# Patient Record
Sex: FEMALE | Race: WHITE | NOT HISPANIC OR LATINO | Employment: UNEMPLOYED | URBAN - METROPOLITAN AREA
[De-identification: names, ages, dates, MRNs, and addresses within clinical notes are randomized per-mention and may not be internally consistent; named-entity substitution may affect disease eponyms.]

---

## 2022-01-23 ENCOUNTER — APPOINTMENT (OUTPATIENT)
Dept: ULTRASOUND IMAGING | Facility: CLINIC | Age: 62
DRG: 481 | End: 2022-01-23
Attending: STUDENT IN AN ORGANIZED HEALTH CARE EDUCATION/TRAINING PROGRAM
Payer: COMMERCIAL

## 2022-01-23 ENCOUNTER — ANESTHESIA EVENT (OUTPATIENT)
Dept: SURGERY | Facility: CLINIC | Age: 62
DRG: 481 | End: 2022-01-23
Payer: COMMERCIAL

## 2022-01-23 ENCOUNTER — APPOINTMENT (OUTPATIENT)
Dept: GENERAL RADIOLOGY | Facility: CLINIC | Age: 62
DRG: 481 | End: 2022-01-23
Attending: EMERGENCY MEDICINE
Payer: COMMERCIAL

## 2022-01-23 ENCOUNTER — APPOINTMENT (OUTPATIENT)
Dept: GENERAL RADIOLOGY | Facility: CLINIC | Age: 62
DRG: 481 | End: 2022-01-23
Attending: INTERNAL MEDICINE
Payer: COMMERCIAL

## 2022-01-23 ENCOUNTER — ANESTHESIA (OUTPATIENT)
Dept: SURGERY | Facility: CLINIC | Age: 62
DRG: 481 | End: 2022-01-23
Payer: COMMERCIAL

## 2022-01-23 ENCOUNTER — HOSPITAL ENCOUNTER (INPATIENT)
Facility: CLINIC | Age: 62
LOS: 10 days | Discharge: NURSING, REHAB OR LONG-TERM CARE | DRG: 481 | End: 2022-02-02
Attending: EMERGENCY MEDICINE | Admitting: INTERNAL MEDICINE
Payer: COMMERCIAL

## 2022-01-23 DIAGNOSIS — S72.001A CLOSED FRACTURE OF RIGHT HIP, INITIAL ENCOUNTER (H): ICD-10-CM

## 2022-01-23 LAB
ABO/RH(D): NORMAL
ALBUMIN SERPL-MCNC: 3.3 G/DL (ref 3.4–5)
ALP SERPL-CCNC: 115 U/L (ref 40–150)
ALT SERPL W P-5'-P-CCNC: 51 U/L (ref 0–50)
ANION GAP SERPL CALCULATED.3IONS-SCNC: 8 MMOL/L (ref 3–14)
ANTIBODY SCREEN: NEGATIVE
APTT PPP: 26 SECONDS (ref 22–38)
AST SERPL W P-5'-P-CCNC: 91 U/L (ref 0–45)
ATRIAL RATE - MUSE: 100 BPM
BASOPHILS # BLD AUTO: 0 10E3/UL (ref 0–0.2)
BASOPHILS NFR BLD AUTO: 0 %
BILIRUB DIRECT SERPL-MCNC: 0.1 MG/DL (ref 0–0.2)
BILIRUB SERPL-MCNC: 0.4 MG/DL (ref 0.2–1.3)
BLD PROD TYP BPU: NORMAL
BLOOD COMPONENT TYPE: NORMAL
BUN SERPL-MCNC: 11 MG/DL (ref 7–30)
CALCIUM SERPL-MCNC: 8.2 MG/DL (ref 8.5–10.1)
CHLORIDE BLD-SCNC: 101 MMOL/L (ref 94–109)
CO2 SERPL-SCNC: 26 MMOL/L (ref 20–32)
CODING SYSTEM: NORMAL
CREAT SERPL-MCNC: 0.78 MG/DL (ref 0.52–1.04)
CROSSMATCH: NORMAL
DIASTOLIC BLOOD PRESSURE - MUSE: NORMAL MMHG
EOSINOPHIL # BLD AUTO: 0.1 10E3/UL (ref 0–0.7)
EOSINOPHIL NFR BLD AUTO: 1 %
ERYTHROCYTE [DISTWIDTH] IN BLOOD BY AUTOMATED COUNT: 19.3 % (ref 10–15)
GFR SERPL CREATININE-BSD FRML MDRD: 85 ML/MIN/1.73M2
GLUCOSE BLD-MCNC: 411 MG/DL (ref 70–99)
GLUCOSE BLDC GLUCOMTR-MCNC: 179 MG/DL (ref 70–99)
GLUCOSE BLDC GLUCOMTR-MCNC: 187 MG/DL (ref 70–99)
GLUCOSE BLDC GLUCOMTR-MCNC: 195 MG/DL (ref 70–99)
GLUCOSE BLDC GLUCOMTR-MCNC: 201 MG/DL (ref 70–99)
GLUCOSE BLDC GLUCOMTR-MCNC: 356 MG/DL (ref 70–99)
GLUCOSE BLDC GLUCOMTR-MCNC: 366 MG/DL (ref 70–99)
HBA1C MFR BLD: 10.4 % (ref 0–5.6)
HCT VFR BLD AUTO: 28.5 % (ref 35–47)
HGB BLD-MCNC: 7.6 G/DL (ref 11.7–15.7)
HGB BLD-MCNC: 7.7 G/DL (ref 11.7–15.7)
HGB BLD-MCNC: 7.9 G/DL (ref 11.7–15.7)
HGB BLD-MCNC: 8.5 G/DL (ref 11.7–15.7)
HOLD SPECIMEN: NORMAL
HOLD SPECIMEN: NORMAL
IMM GRANULOCYTES # BLD: 0 10E3/UL
IMM GRANULOCYTES NFR BLD: 0 %
INR PPP: 1.01 (ref 0.85–1.15)
INTERPRETATION ECG - MUSE: NORMAL
ISSUE DATE AND TIME: NORMAL
LYMPHOCYTES # BLD AUTO: 0.6 10E3/UL (ref 0.8–5.3)
LYMPHOCYTES NFR BLD AUTO: 8 %
MAGNESIUM SERPL-MCNC: 2.2 MG/DL (ref 1.6–2.3)
MCH RBC QN AUTO: 18.9 PG (ref 26.5–33)
MCHC RBC AUTO-ENTMCNC: 27.7 G/DL (ref 31.5–36.5)
MCV RBC AUTO: 68 FL (ref 78–100)
MONOCYTES # BLD AUTO: 0.4 10E3/UL (ref 0–1.3)
MONOCYTES NFR BLD AUTO: 6 %
NEUTROPHILS # BLD AUTO: 6.3 10E3/UL (ref 1.6–8.3)
NEUTROPHILS NFR BLD AUTO: 85 %
NRBC # BLD AUTO: 0 10E3/UL
NRBC BLD AUTO-RTO: 0 /100
P AXIS - MUSE: 61 DEGREES
PHOSPHATE SERPL-MCNC: 3.3 MG/DL (ref 2.5–4.5)
PHOSPHATE SERPL-MCNC: 3.6 MG/DL (ref 2.5–4.5)
PLATELET # BLD AUTO: 265 10E3/UL (ref 150–450)
POTASSIUM BLD-SCNC: 3.6 MMOL/L (ref 3.4–5.3)
POTASSIUM BLD-SCNC: 3.6 MMOL/L (ref 3.4–5.3)
PR INTERVAL - MUSE: 140 MS
PROT SERPL-MCNC: 7 G/DL (ref 6.8–8.8)
QRS DURATION - MUSE: 84 MS
QT - MUSE: 366 MS
QTC - MUSE: 472 MS
R AXIS - MUSE: -15 DEGREES
RBC # BLD AUTO: 4.17 10E6/UL (ref 3.8–5.2)
SARS-COV-2 RNA RESP QL NAA+PROBE: NEGATIVE
SODIUM SERPL-SCNC: 135 MMOL/L (ref 133–144)
SPECIMEN EXPIRATION DATE: NORMAL
SYSTOLIC BLOOD PRESSURE - MUSE: NORMAL MMHG
T AXIS - MUSE: 53 DEGREES
UNIT ABO/RH: NORMAL
UNIT NUMBER: NORMAL
UNIT STATUS: NORMAL
UNIT TYPE ISBT: 6200
VENTRICULAR RATE- MUSE: 100 BPM
WBC # BLD AUTO: 7.4 10E3/UL (ref 4–11)

## 2022-01-23 PROCEDURE — P9041 ALBUMIN (HUMAN),5%, 50ML: HCPCS | Performed by: NURSE ANESTHETIST, CERTIFIED REGISTERED

## 2022-01-23 PROCEDURE — 250N000011 HC RX IP 250 OP 636: Performed by: INTERNAL MEDICINE

## 2022-01-23 PROCEDURE — 0QS604Z REPOSITION RIGHT UPPER FEMUR WITH INTERNAL FIXATION DEVICE, OPEN APPROACH: ICD-10-PCS | Performed by: ORTHOPAEDIC SURGERY

## 2022-01-23 PROCEDURE — 84132 ASSAY OF SERUM POTASSIUM: CPT | Performed by: INTERNAL MEDICINE

## 2022-01-23 PROCEDURE — 360N000084 HC SURGERY LEVEL 4 W/ FLUORO, PER MIN: Performed by: ORTHOPAEDIC SURGERY

## 2022-01-23 PROCEDURE — 710N000010 HC RECOVERY PHASE 1, LEVEL 2, PER MIN: Performed by: ORTHOPAEDIC SURGERY

## 2022-01-23 PROCEDURE — C9113 INJ PANTOPRAZOLE SODIUM, VIA: HCPCS | Performed by: INTERNAL MEDICINE

## 2022-01-23 PROCEDURE — 93005 ELECTROCARDIOGRAM TRACING: CPT | Mod: 59

## 2022-01-23 PROCEDURE — 96375 TX/PRO/DX INJ NEW DRUG ADDON: CPT

## 2022-01-23 PROCEDURE — 250N000009 HC RX 250: Performed by: ANESTHESIOLOGY

## 2022-01-23 PROCEDURE — 76705 ECHO EXAM OF ABDOMEN: CPT

## 2022-01-23 PROCEDURE — 86923 COMPATIBILITY TEST ELECTRIC: CPT | Performed by: ANESTHESIOLOGY

## 2022-01-23 PROCEDURE — 258N000003 HC RX IP 258 OP 636: Performed by: INTERNAL MEDICINE

## 2022-01-23 PROCEDURE — 85025 COMPLETE CBC W/AUTO DIFF WBC: CPT | Performed by: EMERGENCY MEDICINE

## 2022-01-23 PROCEDURE — 87635 SARS-COV-2 COVID-19 AMP PRB: CPT | Performed by: EMERGENCY MEDICINE

## 2022-01-23 PROCEDURE — 250N000011 HC RX IP 250 OP 636: Performed by: ANESTHESIOLOGY

## 2022-01-23 PROCEDURE — 258N000003 HC RX IP 258 OP 636: Performed by: PHYSICIAN ASSISTANT

## 2022-01-23 PROCEDURE — 85018 HEMOGLOBIN: CPT | Performed by: INTERNAL MEDICINE

## 2022-01-23 PROCEDURE — 99223 1ST HOSP IP/OBS HIGH 75: CPT | Mod: AI | Performed by: INTERNAL MEDICINE

## 2022-01-23 PROCEDURE — P9016 RBC LEUKOCYTES REDUCED: HCPCS | Performed by: ANESTHESIOLOGY

## 2022-01-23 PROCEDURE — 36415 COLL VENOUS BLD VENIPUNCTURE: CPT | Performed by: INTERNAL MEDICINE

## 2022-01-23 PROCEDURE — 999N000179 XR SURGERY CARM FLUORO LESS THAN 5 MIN W STILLS

## 2022-01-23 PROCEDURE — 120N000001 HC R&B MED SURG/OB

## 2022-01-23 PROCEDURE — 80053 COMPREHEN METABOLIC PANEL: CPT | Performed by: EMERGENCY MEDICINE

## 2022-01-23 PROCEDURE — 250N000011 HC RX IP 250 OP 636: Performed by: NURSE ANESTHETIST, CERTIFIED REGISTERED

## 2022-01-23 PROCEDURE — 36415 COLL VENOUS BLD VENIPUNCTURE: CPT | Performed by: EMERGENCY MEDICINE

## 2022-01-23 PROCEDURE — 85610 PROTHROMBIN TIME: CPT | Performed by: EMERGENCY MEDICINE

## 2022-01-23 PROCEDURE — 99285 EMERGENCY DEPT VISIT HI MDM: CPT | Mod: 25

## 2022-01-23 PROCEDURE — 84100 ASSAY OF PHOSPHORUS: CPT | Performed by: INTERNAL MEDICINE

## 2022-01-23 PROCEDURE — 250N000025 HC SEVOFLURANE, PER MIN: Performed by: ORTHOPAEDIC SURGERY

## 2022-01-23 PROCEDURE — 73502 X-RAY EXAM HIP UNI 2-3 VIEWS: CPT

## 2022-01-23 PROCEDURE — 82248 BILIRUBIN DIRECT: CPT | Performed by: INTERNAL MEDICINE

## 2022-01-23 PROCEDURE — 370N000017 HC ANESTHESIA TECHNICAL FEE, PER MIN: Performed by: ORTHOPAEDIC SURGERY

## 2022-01-23 PROCEDURE — 86901 BLOOD TYPING SEROLOGIC RH(D): CPT | Performed by: EMERGENCY MEDICINE

## 2022-01-23 PROCEDURE — 258N000003 HC RX IP 258 OP 636: Performed by: ANESTHESIOLOGY

## 2022-01-23 PROCEDURE — 83735 ASSAY OF MAGNESIUM: CPT | Performed by: INTERNAL MEDICINE

## 2022-01-23 PROCEDURE — C1713 ANCHOR/SCREW BN/BN,TIS/BN: HCPCS | Performed by: ORTHOPAEDIC SURGERY

## 2022-01-23 PROCEDURE — 250N000013 HC RX MED GY IP 250 OP 250 PS 637: Performed by: PHYSICIAN ASSISTANT

## 2022-01-23 PROCEDURE — 250N000009 HC RX 250: Performed by: NURSE ANESTHETIST, CERTIFIED REGISTERED

## 2022-01-23 PROCEDURE — 83036 HEMOGLOBIN GLYCOSYLATED A1C: CPT | Performed by: INTERNAL MEDICINE

## 2022-01-23 PROCEDURE — 96374 THER/PROPH/DIAG INJ IV PUSH: CPT | Mod: 59

## 2022-01-23 PROCEDURE — 250N000009 HC RX 250: Performed by: PHYSICIAN ASSISTANT

## 2022-01-23 PROCEDURE — 250N000009 HC RX 250: Performed by: ORTHOPAEDIC SURGERY

## 2022-01-23 PROCEDURE — 250N000011 HC RX IP 250 OP 636: Performed by: PHYSICIAN ASSISTANT

## 2022-01-23 PROCEDURE — 250N000012 HC RX MED GY IP 250 OP 636 PS 637: Performed by: STUDENT IN AN ORGANIZED HEALTH CARE EDUCATION/TRAINING PROGRAM

## 2022-01-23 PROCEDURE — 258N000003 HC RX IP 258 OP 636: Performed by: NURSE ANESTHETIST, CERTIFIED REGISTERED

## 2022-01-23 PROCEDURE — 250N000011 HC RX IP 250 OP 636: Performed by: EMERGENCY MEDICINE

## 2022-01-23 PROCEDURE — 80048 BASIC METABOLIC PNL TOTAL CA: CPT | Performed by: EMERGENCY MEDICINE

## 2022-01-23 PROCEDURE — 250N000013 HC RX MED GY IP 250 OP 250 PS 637: Performed by: INTERNAL MEDICINE

## 2022-01-23 PROCEDURE — 85730 THROMBOPLASTIN TIME PARTIAL: CPT | Performed by: EMERGENCY MEDICINE

## 2022-01-23 PROCEDURE — 272N000001 HC OR GENERAL SUPPLY STERILE: Performed by: ORTHOPAEDIC SURGERY

## 2022-01-23 PROCEDURE — 999N000141 HC STATISTIC PRE-PROCEDURE NURSING ASSESSMENT: Performed by: ORTHOPAEDIC SURGERY

## 2022-01-23 PROCEDURE — 96376 TX/PRO/DX INJ SAME DRUG ADON: CPT

## 2022-01-23 PROCEDURE — C9803 HOPD COVID-19 SPEC COLLECT: HCPCS

## 2022-01-23 PROCEDURE — 250N000012 HC RX MED GY IP 250 OP 636 PS 637: Performed by: INTERNAL MEDICINE

## 2022-01-23 DEVICE — IMP SCR SYN DHS/DCS LAG 12.7X85MM 1 STEP SS 280.285: Type: IMPLANTABLE DEVICE | Site: HIP | Status: FUNCTIONAL

## 2022-01-23 DEVICE — IMPLANTABLE DEVICE: Type: IMPLANTABLE DEVICE | Site: HIP | Status: FUNCTIONAL

## 2022-01-23 DEVICE — IMP SCR SYN CORTEX 4.5X44MM SELF TAP SS 214.844: Type: IMPLANTABLE DEVICE | Site: HIP | Status: FUNCTIONAL

## 2022-01-23 DEVICE — IMP SCR SYN CORTEX 4.5X42MM SELF TAP SS 214.842: Type: IMPLANTABLE DEVICE | Site: HIP | Status: FUNCTIONAL

## 2022-01-23 RX ORDER — FENTANYL CITRATE 0.05 MG/ML
25 INJECTION, SOLUTION INTRAMUSCULAR; INTRAVENOUS EVERY 5 MIN PRN
Status: DISCONTINUED | OUTPATIENT
Start: 2022-01-23 | End: 2022-01-23 | Stop reason: HOSPADM

## 2022-01-23 RX ORDER — FENTANYL CITRATE 50 UG/ML
INJECTION, SOLUTION INTRAMUSCULAR; INTRAVENOUS PRN
Status: DISCONTINUED | OUTPATIENT
Start: 2022-01-23 | End: 2022-01-23

## 2022-01-23 RX ORDER — OXYCODONE HYDROCHLORIDE 5 MG/1
5 TABLET ORAL EVERY 4 HOURS PRN
Status: DISCONTINUED | OUTPATIENT
Start: 2022-01-23 | End: 2022-01-23

## 2022-01-23 RX ORDER — ONDANSETRON 4 MG/1
4 TABLET, ORALLY DISINTEGRATING ORAL EVERY 6 HOURS PRN
Status: DISCONTINUED | OUTPATIENT
Start: 2022-01-23 | End: 2022-01-23

## 2022-01-23 RX ORDER — DEXTROSE MONOHYDRATE 25 G/50ML
25-50 INJECTION, SOLUTION INTRAVENOUS
Status: DISCONTINUED | OUTPATIENT
Start: 2022-01-23 | End: 2022-02-02 | Stop reason: HOSPADM

## 2022-01-23 RX ORDER — ONDANSETRON 4 MG/1
4 TABLET, ORALLY DISINTEGRATING ORAL EVERY 30 MIN PRN
Status: DISCONTINUED | OUTPATIENT
Start: 2022-01-23 | End: 2022-01-23 | Stop reason: HOSPADM

## 2022-01-23 RX ORDER — NICOTINE POLACRILEX 4 MG
15-30 LOZENGE BUCCAL
Status: DISCONTINUED | OUTPATIENT
Start: 2022-01-23 | End: 2022-01-23

## 2022-01-23 RX ORDER — PROPOFOL 10 MG/ML
INJECTION, EMULSION INTRAVENOUS PRN
Status: DISCONTINUED | OUTPATIENT
Start: 2022-01-23 | End: 2022-01-23

## 2022-01-23 RX ORDER — ONDANSETRON 2 MG/ML
4 INJECTION INTRAMUSCULAR; INTRAVENOUS EVERY 6 HOURS PRN
Status: DISCONTINUED | OUTPATIENT
Start: 2022-01-23 | End: 2022-02-02 | Stop reason: HOSPADM

## 2022-01-23 RX ORDER — DEXTROSE MONOHYDRATE 25 G/50ML
25-50 INJECTION, SOLUTION INTRAVENOUS
Status: DISCONTINUED | OUTPATIENT
Start: 2022-01-23 | End: 2022-01-23

## 2022-01-23 RX ORDER — MULTIPLE VITAMINS W/ MINERALS TAB 9MG-400MCG
1 TAB ORAL DAILY
Status: DISCONTINUED | OUTPATIENT
Start: 2022-01-23 | End: 2022-02-02 | Stop reason: HOSPADM

## 2022-01-23 RX ORDER — LIDOCAINE HYDROCHLORIDE 20 MG/ML
INJECTION, SOLUTION INFILTRATION; PERINEURAL PRN
Status: DISCONTINUED | OUTPATIENT
Start: 2022-01-23 | End: 2022-01-23

## 2022-01-23 RX ORDER — NALOXONE HYDROCHLORIDE 0.4 MG/ML
0.4 INJECTION, SOLUTION INTRAMUSCULAR; INTRAVENOUS; SUBCUTANEOUS
Status: DISCONTINUED | OUTPATIENT
Start: 2022-01-23 | End: 2022-02-02 | Stop reason: HOSPADM

## 2022-01-23 RX ORDER — AMOXICILLIN 250 MG
1 CAPSULE ORAL 2 TIMES DAILY
Status: DISCONTINUED | OUTPATIENT
Start: 2022-01-23 | End: 2022-01-23

## 2022-01-23 RX ORDER — FENTANYL CITRATE 0.05 MG/ML
50 INJECTION, SOLUTION INTRAMUSCULAR; INTRAVENOUS
Status: DISCONTINUED | OUTPATIENT
Start: 2022-01-23 | End: 2022-01-23 | Stop reason: HOSPADM

## 2022-01-23 RX ORDER — ONDANSETRON 2 MG/ML
4 INJECTION INTRAMUSCULAR; INTRAVENOUS EVERY 6 HOURS PRN
Status: DISCONTINUED | OUTPATIENT
Start: 2022-01-23 | End: 2022-01-23

## 2022-01-23 RX ORDER — ONDANSETRON 2 MG/ML
4 INJECTION INTRAMUSCULAR; INTRAVENOUS EVERY 30 MIN PRN
Status: DISCONTINUED | OUTPATIENT
Start: 2022-01-23 | End: 2022-01-23 | Stop reason: HOSPADM

## 2022-01-23 RX ORDER — TRANEXAMIC ACID 10 MG/ML
1 INJECTION, SOLUTION INTRAVENOUS ONCE
Status: COMPLETED | OUTPATIENT
Start: 2022-01-23 | End: 2022-01-23

## 2022-01-23 RX ORDER — HYDRALAZINE HYDROCHLORIDE 20 MG/ML
10 INJECTION INTRAMUSCULAR; INTRAVENOUS EVERY 4 HOURS PRN
Status: DISCONTINUED | OUTPATIENT
Start: 2022-01-23 | End: 2022-02-02 | Stop reason: HOSPADM

## 2022-01-23 RX ORDER — ACETAMINOPHEN 325 MG/1
650 TABLET ORAL EVERY 6 HOURS PRN
Status: DISCONTINUED | OUTPATIENT
Start: 2022-01-23 | End: 2022-01-23

## 2022-01-23 RX ORDER — LIDOCAINE 40 MG/G
CREAM TOPICAL
Status: DISCONTINUED | OUTPATIENT
Start: 2022-01-23 | End: 2022-01-25

## 2022-01-23 RX ORDER — OXYCODONE HYDROCHLORIDE 5 MG/1
10 TABLET ORAL EVERY 4 HOURS PRN
Status: DISCONTINUED | OUTPATIENT
Start: 2022-01-23 | End: 2022-01-28

## 2022-01-23 RX ORDER — AMOXICILLIN 250 MG
2 CAPSULE ORAL 2 TIMES DAILY
Status: DISCONTINUED | OUTPATIENT
Start: 2022-01-23 | End: 2022-02-02 | Stop reason: HOSPADM

## 2022-01-23 RX ORDER — PANTOPRAZOLE SODIUM 40 MG/1
40 TABLET, DELAYED RELEASE ORAL
Status: DISCONTINUED | OUTPATIENT
Start: 2022-01-23 | End: 2022-01-23

## 2022-01-23 RX ORDER — POLYETHYLENE GLYCOL 3350 17 G/17G
17 POWDER, FOR SOLUTION ORAL DAILY
Status: DISCONTINUED | OUTPATIENT
Start: 2022-01-24 | End: 2022-02-02 | Stop reason: HOSPADM

## 2022-01-23 RX ORDER — NICOTINE POLACRILEX 4 MG
15-30 LOZENGE BUCCAL
Status: DISCONTINUED | OUTPATIENT
Start: 2022-01-23 | End: 2022-02-02 | Stop reason: HOSPADM

## 2022-01-23 RX ORDER — OXYCODONE HYDROCHLORIDE 5 MG/1
5 TABLET ORAL EVERY 4 HOURS PRN
Status: DISCONTINUED | OUTPATIENT
Start: 2022-01-23 | End: 2022-01-23 | Stop reason: HOSPADM

## 2022-01-23 RX ORDER — AMOXICILLIN 250 MG
1 CAPSULE ORAL 2 TIMES DAILY
Status: DISCONTINUED | OUTPATIENT
Start: 2022-01-23 | End: 2022-02-02 | Stop reason: HOSPADM

## 2022-01-23 RX ORDER — HYDROMORPHONE HCL IN WATER/PF 6 MG/30 ML
.2-.4 PATIENT CONTROLLED ANALGESIA SYRINGE INTRAVENOUS
Status: DISCONTINUED | OUTPATIENT
Start: 2022-01-23 | End: 2022-01-23

## 2022-01-23 RX ORDER — ALBUMIN, HUMAN INJ 5% 5 %
SOLUTION INTRAVENOUS CONTINUOUS PRN
Status: DISCONTINUED | OUTPATIENT
Start: 2022-01-23 | End: 2022-01-23

## 2022-01-23 RX ORDER — ONDANSETRON 4 MG/1
4 TABLET, ORALLY DISINTEGRATING ORAL EVERY 6 HOURS PRN
Status: DISCONTINUED | OUTPATIENT
Start: 2022-01-23 | End: 2022-02-02 | Stop reason: HOSPADM

## 2022-01-23 RX ORDER — CEFAZOLIN SODIUM 2 G/100ML
2 INJECTION, SOLUTION INTRAVENOUS
Status: COMPLETED | OUTPATIENT
Start: 2022-01-23 | End: 2022-01-23

## 2022-01-23 RX ORDER — MAGNESIUM HYDROXIDE 1200 MG/15ML
LIQUID ORAL PRN
Status: DISCONTINUED | OUTPATIENT
Start: 2022-01-23 | End: 2022-01-23 | Stop reason: HOSPADM

## 2022-01-23 RX ORDER — HYDROMORPHONE HCL IN WATER/PF 6 MG/30 ML
0.2 PATIENT CONTROLLED ANALGESIA SYRINGE INTRAVENOUS EVERY 5 MIN PRN
Status: DISCONTINUED | OUTPATIENT
Start: 2022-01-23 | End: 2022-01-23 | Stop reason: HOSPADM

## 2022-01-23 RX ORDER — POLYETHYLENE GLYCOL 3350 17 G/17G
17 POWDER, FOR SOLUTION ORAL DAILY PRN
Status: DISCONTINUED | OUTPATIENT
Start: 2022-01-23 | End: 2022-02-02 | Stop reason: HOSPADM

## 2022-01-23 RX ORDER — CEFAZOLIN SODIUM 2 G/100ML
2 INJECTION, SOLUTION INTRAVENOUS SEE ADMIN INSTRUCTIONS
Status: DISCONTINUED | OUTPATIENT
Start: 2022-01-23 | End: 2022-01-23 | Stop reason: HOSPADM

## 2022-01-23 RX ORDER — CEFAZOLIN SODIUM 1 G/3ML
1 INJECTION, POWDER, FOR SOLUTION INTRAMUSCULAR; INTRAVENOUS EVERY 8 HOURS
Status: COMPLETED | OUTPATIENT
Start: 2022-01-24 | End: 2022-01-24

## 2022-01-23 RX ORDER — BISACODYL 10 MG
10 SUPPOSITORY, RECTAL RECTAL DAILY PRN
Status: DISCONTINUED | OUTPATIENT
Start: 2022-01-23 | End: 2022-02-02 | Stop reason: HOSPADM

## 2022-01-23 RX ORDER — SODIUM CHLORIDE, SODIUM LACTATE, POTASSIUM CHLORIDE, CALCIUM CHLORIDE 600; 310; 30; 20 MG/100ML; MG/100ML; MG/100ML; MG/100ML
INJECTION, SOLUTION INTRAVENOUS CONTINUOUS
Status: DISCONTINUED | OUTPATIENT
Start: 2022-01-23 | End: 2022-01-24

## 2022-01-23 RX ORDER — ACETAMINOPHEN 650 MG/1
650 SUPPOSITORY RECTAL EVERY 6 HOURS PRN
Status: DISCONTINUED | OUTPATIENT
Start: 2022-01-23 | End: 2022-02-02 | Stop reason: HOSPADM

## 2022-01-23 RX ORDER — HYDROMORPHONE HCL IN WATER/PF 6 MG/30 ML
0.4 PATIENT CONTROLLED ANALGESIA SYRINGE INTRAVENOUS
Status: DISCONTINUED | OUTPATIENT
Start: 2022-01-23 | End: 2022-02-02 | Stop reason: HOSPADM

## 2022-01-23 RX ORDER — EPHEDRINE SULFATE 50 MG/ML
INJECTION, SOLUTION INTRAMUSCULAR; INTRAVENOUS; SUBCUTANEOUS PRN
Status: DISCONTINUED | OUTPATIENT
Start: 2022-01-23 | End: 2022-01-23

## 2022-01-23 RX ORDER — ACETAMINOPHEN 325 MG/1
975 TABLET ORAL EVERY 8 HOURS
Status: COMPLETED | OUTPATIENT
Start: 2022-01-23 | End: 2022-01-26

## 2022-01-23 RX ORDER — SODIUM CHLORIDE, SODIUM LACTATE, POTASSIUM CHLORIDE, CALCIUM CHLORIDE 600; 310; 30; 20 MG/100ML; MG/100ML; MG/100ML; MG/100ML
INJECTION, SOLUTION INTRAVENOUS CONTINUOUS
Status: DISCONTINUED | OUTPATIENT
Start: 2022-01-23 | End: 2022-01-23 | Stop reason: HOSPADM

## 2022-01-23 RX ORDER — PROCHLORPERAZINE MALEATE 5 MG
10 TABLET ORAL EVERY 6 HOURS PRN
Status: DISCONTINUED | OUTPATIENT
Start: 2022-01-23 | End: 2022-02-02 | Stop reason: HOSPADM

## 2022-01-23 RX ORDER — HYDROXYZINE HYDROCHLORIDE 25 MG/1
25 TABLET, FILM COATED ORAL EVERY 6 HOURS PRN
Status: DISCONTINUED | OUTPATIENT
Start: 2022-01-23 | End: 2022-02-02 | Stop reason: HOSPADM

## 2022-01-23 RX ORDER — FOLIC ACID 1 MG/1
1 TABLET ORAL DAILY
Status: DISCONTINUED | OUTPATIENT
Start: 2022-01-23 | End: 2022-02-02 | Stop reason: HOSPADM

## 2022-01-23 RX ORDER — LIDOCAINE 40 MG/G
CREAM TOPICAL
Status: DISCONTINUED | OUTPATIENT
Start: 2022-01-23 | End: 2022-02-02 | Stop reason: HOSPADM

## 2022-01-23 RX ORDER — SODIUM CHLORIDE 9 MG/ML
INJECTION, SOLUTION INTRAVENOUS CONTINUOUS
Status: DISCONTINUED | OUTPATIENT
Start: 2022-01-23 | End: 2022-01-24

## 2022-01-23 RX ORDER — OXYCODONE HYDROCHLORIDE 5 MG/1
5 TABLET ORAL EVERY 4 HOURS PRN
Status: DISCONTINUED | OUTPATIENT
Start: 2022-01-23 | End: 2022-01-28

## 2022-01-23 RX ORDER — NALOXONE HYDROCHLORIDE 0.4 MG/ML
0.2 INJECTION, SOLUTION INTRAMUSCULAR; INTRAVENOUS; SUBCUTANEOUS
Status: DISCONTINUED | OUTPATIENT
Start: 2022-01-23 | End: 2022-02-02 | Stop reason: HOSPADM

## 2022-01-23 RX ORDER — LOSARTAN POTASSIUM 25 MG/1
25 TABLET ORAL DAILY
Status: DISCONTINUED | OUTPATIENT
Start: 2022-01-23 | End: 2022-01-24

## 2022-01-23 RX ORDER — FENTANYL CITRATE 50 UG/ML
100 INJECTION, SOLUTION INTRAMUSCULAR; INTRAVENOUS
Status: COMPLETED | OUTPATIENT
Start: 2022-01-23 | End: 2022-01-23

## 2022-01-23 RX ORDER — ACETAMINOPHEN 325 MG/1
650 TABLET ORAL EVERY 4 HOURS PRN
Status: DISCONTINUED | OUTPATIENT
Start: 2022-01-26 | End: 2022-02-02 | Stop reason: HOSPADM

## 2022-01-23 RX ORDER — HYDROXYZINE HYDROCHLORIDE 25 MG/1
25 TABLET, FILM COATED ORAL EVERY 6 HOURS PRN
Status: DISCONTINUED | OUTPATIENT
Start: 2022-01-23 | End: 2022-01-23

## 2022-01-23 RX ORDER — ONDANSETRON 2 MG/ML
INJECTION INTRAMUSCULAR; INTRAVENOUS PRN
Status: DISCONTINUED | OUTPATIENT
Start: 2022-01-23 | End: 2022-01-23

## 2022-01-23 RX ORDER — HYDROMORPHONE HCL IN WATER/PF 6 MG/30 ML
0.2 PATIENT CONTROLLED ANALGESIA SYRINGE INTRAVENOUS
Status: DISCONTINUED | OUTPATIENT
Start: 2022-01-23 | End: 2022-02-02 | Stop reason: HOSPADM

## 2022-01-23 RX ORDER — HYDROMORPHONE HYDROCHLORIDE 1 MG/ML
0.5 INJECTION, SOLUTION INTRAMUSCULAR; INTRAVENOUS; SUBCUTANEOUS ONCE
Status: COMPLETED | OUTPATIENT
Start: 2022-01-23 | End: 2022-01-23

## 2022-01-23 RX ADMIN — HYDROMORPHONE HYDROCHLORIDE 0.2 MG: 0.2 INJECTION, SOLUTION INTRAMUSCULAR; INTRAVENOUS; SUBCUTANEOUS at 06:41

## 2022-01-23 RX ADMIN — HYDROXYZINE HYDROCHLORIDE 25 MG: 25 TABLET ORAL at 05:17

## 2022-01-23 RX ADMIN — TRANEXAMIC ACID 1 G: 10 INJECTION, SOLUTION INTRAVENOUS at 16:47

## 2022-01-23 RX ADMIN — MIDAZOLAM 2 MG: 1 INJECTION INTRAMUSCULAR; INTRAVENOUS at 15:36

## 2022-01-23 RX ADMIN — ACETAMINOPHEN 975 MG: 325 TABLET, FILM COATED ORAL at 20:32

## 2022-01-23 RX ADMIN — FENTANYL CITRATE 25 MCG: 50 INJECTION, SOLUTION INTRAMUSCULAR; INTRAVENOUS at 18:00

## 2022-01-23 RX ADMIN — Medication 5 MG: at 16:35

## 2022-01-23 RX ADMIN — SODIUM CHLORIDE, POTASSIUM CHLORIDE, SODIUM LACTATE AND CALCIUM CHLORIDE: 600; 310; 30; 20 INJECTION, SOLUTION INTRAVENOUS at 15:27

## 2022-01-23 RX ADMIN — PROPOFOL 150 MG: 10 INJECTION, EMULSION INTRAVENOUS at 15:42

## 2022-01-23 RX ADMIN — HYDROMORPHONE HYDROCHLORIDE 0.2 MG: 0.2 INJECTION, SOLUTION INTRAMUSCULAR; INTRAVENOUS; SUBCUTANEOUS at 18:02

## 2022-01-23 RX ADMIN — FENTANYL CITRATE 50 MCG: 50 INJECTION, SOLUTION INTRAMUSCULAR; INTRAVENOUS at 15:42

## 2022-01-23 RX ADMIN — ROCURONIUM BROMIDE 50 MG: 50 INJECTION, SOLUTION INTRAVENOUS at 15:42

## 2022-01-23 RX ADMIN — Medication 25 ML: at 15:25

## 2022-01-23 RX ADMIN — ASPIRIN 325 MG: 325 TABLET, DELAYED RELEASE ORAL at 20:34

## 2022-01-23 RX ADMIN — FENTANYL CITRATE 25 MCG: 50 INJECTION, SOLUTION INTRAMUSCULAR; INTRAVENOUS at 17:35

## 2022-01-23 RX ADMIN — ONDANSETRON 4 MG: 2 INJECTION INTRAMUSCULAR; INTRAVENOUS at 16:45

## 2022-01-23 RX ADMIN — HYDROMORPHONE HYDROCHLORIDE 0.4 MG: 0.2 INJECTION, SOLUTION INTRAMUSCULAR; INTRAVENOUS; SUBCUTANEOUS at 08:14

## 2022-01-23 RX ADMIN — HYDROMORPHONE HYDROCHLORIDE 0.5 MG: 1 INJECTION, SOLUTION INTRAMUSCULAR; INTRAVENOUS; SUBCUTANEOUS at 01:58

## 2022-01-23 RX ADMIN — FOLIC ACID 1 MG: 1 TABLET ORAL at 08:24

## 2022-01-23 RX ADMIN — PANTOPRAZOLE SODIUM 40 MG: 40 INJECTION, POWDER, FOR SOLUTION INTRAVENOUS at 20:28

## 2022-01-23 RX ADMIN — INSULIN ASPART 2 UNITS: 100 INJECTION, SOLUTION INTRAVENOUS; SUBCUTANEOUS at 11:02

## 2022-01-23 RX ADMIN — PHENYLEPHRINE HYDROCHLORIDE 100 MCG: 10 INJECTION INTRAVENOUS at 16:16

## 2022-01-23 RX ADMIN — OXYCODONE HYDROCHLORIDE 5 MG: 5 TABLET ORAL at 20:35

## 2022-01-23 RX ADMIN — SODIUM CHLORIDE, POTASSIUM CHLORIDE, SODIUM LACTATE AND CALCIUM CHLORIDE: 600; 310; 30; 20 INJECTION, SOLUTION INTRAVENOUS at 20:12

## 2022-01-23 RX ADMIN — INSULIN ASPART 5 UNITS: 100 INJECTION, SOLUTION INTRAVENOUS; SUBCUTANEOUS at 08:30

## 2022-01-23 RX ADMIN — FENTANYL CITRATE 100 MCG: 50 INJECTION INTRAMUSCULAR; INTRAVENOUS at 03:20

## 2022-01-23 RX ADMIN — HYDROMORPHONE HYDROCHLORIDE 0.4 MG: 0.2 INJECTION, SOLUTION INTRAMUSCULAR; INTRAVENOUS; SUBCUTANEOUS at 22:49

## 2022-01-23 RX ADMIN — LOSARTAN POTASSIUM 25 MG: 25 TABLET, FILM COATED ORAL at 08:24

## 2022-01-23 RX ADMIN — INSULIN ASPART 1 UNITS: 100 INJECTION, SOLUTION INTRAVENOUS; SUBCUTANEOUS at 20:38

## 2022-01-23 RX ADMIN — HYDROMORPHONE HYDROCHLORIDE 0.2 MG: 0.2 INJECTION, SOLUTION INTRAMUSCULAR; INTRAVENOUS; SUBCUTANEOUS at 19:07

## 2022-01-23 RX ADMIN — FENTANYL CITRATE 25 MCG: 50 INJECTION, SOLUTION INTRAMUSCULAR; INTRAVENOUS at 17:43

## 2022-01-23 RX ADMIN — ONDANSETRON 4 MG: 4 TABLET, ORALLY DISINTEGRATING ORAL at 04:09

## 2022-01-23 RX ADMIN — FENTANYL CITRATE 50 MCG: 50 INJECTION, SOLUTION INTRAMUSCULAR; INTRAVENOUS at 17:06

## 2022-01-23 RX ADMIN — FENTANYL CITRATE 25 MCG: 50 INJECTION, SOLUTION INTRAMUSCULAR; INTRAVENOUS at 17:53

## 2022-01-23 RX ADMIN — HYDROMORPHONE HYDROCHLORIDE 0.4 MG: 0.2 INJECTION, SOLUTION INTRAMUSCULAR; INTRAVENOUS; SUBCUTANEOUS at 12:52

## 2022-01-23 RX ADMIN — ROCURONIUM BROMIDE 10 MG: 50 INJECTION, SOLUTION INTRAVENOUS at 16:31

## 2022-01-23 RX ADMIN — HYDROMORPHONE HYDROCHLORIDE 0.2 MG: 0.2 INJECTION, SOLUTION INTRAMUSCULAR; INTRAVENOUS; SUBCUTANEOUS at 18:15

## 2022-01-23 RX ADMIN — CEFAZOLIN SODIUM 2 G: 2 INJECTION, SOLUTION INTRAVENOUS at 15:46

## 2022-01-23 RX ADMIN — TRANEXAMIC ACID 1 G: 10 INJECTION, SOLUTION INTRAVENOUS at 16:00

## 2022-01-23 RX ADMIN — SODIUM CHLORIDE: 9 INJECTION, SOLUTION INTRAVENOUS at 06:58

## 2022-01-23 RX ADMIN — MULTIPLE VITAMINS W/ MINERALS TAB 1 TABLET: TAB at 08:24

## 2022-01-23 RX ADMIN — SUGAMMADEX 200 MG: 100 INJECTION, SOLUTION INTRAVENOUS at 16:59

## 2022-01-23 RX ADMIN — LIDOCAINE HYDROCHLORIDE 60 MG: 20 INJECTION, SOLUTION INFILTRATION; PERINEURAL at 15:42

## 2022-01-23 RX ADMIN — Medication 5 MG: at 16:08

## 2022-01-23 RX ADMIN — FENTANYL CITRATE 100 MCG: 50 INJECTION INTRAMUSCULAR; INTRAVENOUS at 00:50

## 2022-01-23 RX ADMIN — PANTOPRAZOLE SODIUM 40 MG: 40 INJECTION, POWDER, FOR SOLUTION INTRAVENOUS at 08:21

## 2022-01-23 RX ADMIN — HYDROMORPHONE HYDROCHLORIDE 0.4 MG: 0.2 INJECTION, SOLUTION INTRAMUSCULAR; INTRAVENOUS; SUBCUTANEOUS at 10:35

## 2022-01-23 RX ADMIN — SENNOSIDES AND DOCUSATE SODIUM 1 TABLET: 50; 8.6 TABLET ORAL at 20:33

## 2022-01-23 RX ADMIN — INSULIN GLARGINE 10 UNITS: 100 INJECTION, SOLUTION SUBCUTANEOUS at 09:50

## 2022-01-23 RX ADMIN — HYDROXYZINE HYDROCHLORIDE 25 MG: 25 TABLET ORAL at 22:13

## 2022-01-23 RX ADMIN — OXYCODONE HYDROCHLORIDE 5 MG: 5 TABLET ORAL at 05:17

## 2022-01-23 RX ADMIN — SENNOSIDES AND DOCUSATE SODIUM 1 TABLET: 50; 8.6 TABLET ORAL at 08:24

## 2022-01-23 RX ADMIN — ALBUMIN HUMAN: 0.05 INJECTION, SOLUTION INTRAVENOUS at 16:35

## 2022-01-23 RX ADMIN — PHENYLEPHRINE HYDROCHLORIDE 100 MCG: 10 INJECTION INTRAVENOUS at 16:08

## 2022-01-23 RX ADMIN — THIAMINE HCL TAB 100 MG 100 MG: 100 TAB at 08:24

## 2022-01-23 RX ADMIN — MIDAZOLAM 2 MG: 1 INJECTION INTRAMUSCULAR; INTRAVENOUS at 15:27

## 2022-01-23 ASSESSMENT — ACTIVITIES OF DAILY LIVING (ADL)
ADLS_ACUITY_SCORE: 6
ADLS_ACUITY_SCORE: 8
ADLS_ACUITY_SCORE: 14
ADLS_ACUITY_SCORE: 8
ADLS_ACUITY_SCORE: 14
ADLS_ACUITY_SCORE: 14
ADLS_ACUITY_SCORE: 8
ADLS_ACUITY_SCORE: 14
ADLS_ACUITY_SCORE: 8
ADLS_ACUITY_SCORE: 14
ADLS_ACUITY_SCORE: 8
ADLS_ACUITY_SCORE: 6
ADLS_ACUITY_SCORE: 8
ADLS_ACUITY_SCORE: 6
ADLS_ACUITY_SCORE: 6
ADLS_ACUITY_SCORE: 8

## 2022-01-23 ASSESSMENT — ENCOUNTER SYMPTOMS
NUMBNESS: 0
ARTHRALGIAS: 1
SHORTNESS OF BREATH: 0
ABDOMINAL PAIN: 0

## 2022-01-23 ASSESSMENT — MIFFLIN-ST. JEOR: SCORE: 1510

## 2022-01-23 NOTE — PLAN OF CARE
A&O x4 On RA. Pain managed with Oxycodone and Dilaudid. Voiding with pure wick. Bedrest. CMS intact. PIV infusing. Positioned for comfort. Will continue to monitor.

## 2022-01-23 NOTE — H&P
"[facility]    History and Physical - Hospitalist Service       Date of Admission:  1/23/2022  Dictation #: [id number]  Brief Summary (see dictation for more details): Mechanical fallslX2, now with acute basicervical fracture of the right femur; npo, iv fluids, pain management, Ortho consult  - Acute on chronic iron deficiency anemia; has h/o esophagitis and anastomotic ulcer; h/o iron deficiency anemia, uses Aleve 4 pills once-twice daily; Hb 7.9, Hb had been 9.7 on 01/13; received iv iron infusion yesterday at [facility]; hemodynamically stable; PPI iv BID, GI consult  - Alcohol dependence- reports drinking 23- alcoholic beverages daily, not interested in detox treatment, CIWA, vitamins, monitor for withdrawals (at high risk although she states that \"that should not be a problem\").    Clinically Significant Risk Factors Present on Admission          # Hypocalcemia: Ca = 8.2 mg/dL (Ref range: 8.5 - 10.1 mg/dL) and/or iCa = N/A on admission, will replace as needed             Mildred Martinez MD  Hospitalist Service  [facility]  Securely message with the Vocera Web Console (learn more here)  Text page via [employer] Paging/Directory       "

## 2022-01-23 NOTE — ANESTHESIA PROCEDURE NOTES
Fascia iliaca Procedure Note    Pre-Procedure   Staff -        Anesthesiologist:  Ahsan Downs MD       Performed By: Anesthesiologist       Location: pre-op       Pre-Anesthestic Checklist: patient identified, IV checked, risks and benefits discussed, informed consent, pre-op evaluation, at physician/surgeon's request and post-op pain management  Timeout:       Correct Patient: Yes        Correct Procedure: Yes        Correct Site: Yes        Correct Position: Yes        Correct Laterality: Yes        Site Marked: Yes  Procedure Documentation  Procedure: Fascia iliaca       Laterality: right       Patient Position: supine       Skin prep: Chloraprep      Local skin infiltrated with mL of 1% lidocaine.        Needle Type: short bevel       Needle Gauge: 21.        Needle Length (Inches): 3.13        Needle Length (millimeters): 100        Ultrasound guided       1. Ultrasound was used to identify targeted nerve, plexus, vascular marker, or fascial plane and place a needle adjacent to it in real-time.       2. Ultrasound was used to visualize the spread of anesthetic in close proximity to the above referenced structure.       3. A permanent image is entered into the patient's record.    Assessment/Narrative         The placement was negative for: blood aspirated, painful injection and site bleeding       Paresthesias: No.     Bolus given via needle..        Secured via.        Insertion/Infusion Method: Single Shot       Complications: none       Injection made incrementally with aspirations every 5 mL.    Medication(s) Administered   Ropivacaine 0.5% w/ 1:400K Epi (Injection), 25 mL   Comments:  Ultrasound Interpretation, peripheral nerve block    1.  Under ultrasound guidance, needle was inserted and placed in close proximity to the nerve.  2. Ultrasound was also used to visualize the spread of the anesthetic in close proximity to the nerve being blocked.  3. The nerve appeared anatomically normal.  4.  There were no apparent abnormal pathological findings.  5. A permanent ultrasound image was saved n the patient's record.    10cc NS injected folloewd by 25cc of local.     Ahsan Downs MD   3:25 PM

## 2022-01-23 NOTE — ANESTHESIA PREPROCEDURE EVALUATION
Anesthesia Pre-Procedure Evaluation    Patient: Lamar Menendez   MRN: [id number] : 1960        Preoperative Diagnosis: Closed hip fracture (H) [S72.009A]    Procedure : Procedure(s):  OPEN REDUCTION INTERNAL FIXATION RIGHT HIP          No past medical history on file.   No past surgical history on file.   Allergies   Allergen Reactions     Latex       Social History     Tobacco Use     Smoking status: Not on file     Smokeless tobacco: Not on file   Substance Use Topics     Alcohol use: Not on file      Wt Readings from Last 1 Encounters:   No data found for Wt        Anesthesia Evaluation   Pt has had prior anesthetic.     No history of anesthetic complications       ROS/MED HX  ENT/Pulmonary:     (+) sleep apnea (didn't tolerate it), doesn't use CPAP,     Neurologic:       Cardiovascular: Comment: ~2 weeks ago she was having worsening SOB and found to be hypertensive and with edema bilaterally.  She was started on a diuretic and she says she is back to normal with her breathing and swelling went away    (+) hypertension-----    METS/Exercise Tolerance:     Hematologic:     (+) anemia,     Musculoskeletal:   (+) fracture, Fracture location: RLE,     GI/Hepatic: Comment: Ho gastric bypass, esophagitis, ulcer     (-) GERD   Renal/Genitourinary:       Endo:     (+) type II DM, Last HgA1c: 10.4, date: ,     Psychiatric/Substance Use:     (+) psychiatric history anxiety and depression alcohol abuse (sober for 12yrs but recently started drinking again)     Infectious Disease: Comment: History of pneumonia and endocarditis, resolved      Malignancy:       Other: Comment: DNR/DNI - after discussing with the patient, we will remove the DNI.  She is ok with some IV medications of my choosing, but wants nothing heroic.  She absolutely does not want chest compressions.           Physical Exam    Airway        Mallampati: II   TM distance: > 3 FB   Neck ROM: full   Mouth opening: > 3 cm    Respiratory Devices and  Support         Dental       (+) upper dentures and lower dentures      Cardiovascular   cardiovascular exam normal       Rhythm and rate: regular and normal     Pulmonary   pulmonary exam normal        breath sounds clear to auscultation           OUTSIDE LABS:  CBC:   Lab Results   Component Value Date    WBC 7.4 01/23/2022    HGB 7.6 (L) 01/23/2022    HGB 7.9 (L) 01/23/2022    HCT 28.5 (L) 01/23/2022     01/23/2022     BMP:   Lab Results   Component Value Date     01/23/2022    POTASSIUM 3.6 01/23/2022    POTASSIUM 3.6 01/23/2022    CHLORIDE 101 01/23/2022    CO2 26 01/23/2022    BUN 11 01/23/2022    CR 0.78 01/23/2022     (H) 01/23/2022     (H) 01/23/2022     COAGS:   Lab Results   Component Value Date    PTT 26 01/23/2022    INR 1.01 01/23/2022     POC: No results found for: BGM, HCG, HCGS  HEPATIC:   Lab Results   Component Value Date    ALBUMIN 3.3 (L) 01/23/2022    PROTTOTAL 7.0 01/23/2022    ALT 51 (H) 01/23/2022    AST 91 (H) 01/23/2022    ALKPHOS 115 01/23/2022    BILITOTAL 0.4 01/23/2022     OTHER:   Lab Results   Component Value Date    A1C 10.4 (H) 01/23/2022    GABRIEL 8.2 (L) 01/23/2022    PHOS 3.6 01/23/2022    MAG 2.2 01/23/2022       Anesthesia Plan    ASA Status:  3   NPO Status:  NPO Appropriate    Anesthesia Type: General.     - Airway: ETT   Induction: Intravenous.   Maintenance: Balanced.   Techniques and Equipment:     - Airway: Video-Laryngoscope         Consents    Anesthesia Plan(s) and associated risks, benefits, and realistic alternatives discussed. Questions answered and patient/representative(s) expressed understanding.    - Discussed:     - Discussed with:  Patient         Postoperative Care    Pain management: Peripheral nerve block (Single Shot), IV analgesics, Multi-modal analgesia.   PONV prophylaxis: Ondansetron (or other 5HT-3), Dexamethasone or Solumedrol, Background Propofol Infusion     Comments:    Other Comments: Medical record reviewed    Insulin,  follow protocol.  Glucometer.  Transfuse given starting Hgb 7.6            Ahsan Downs MD

## 2022-01-23 NOTE — BRIEF OP NOTE
[facility]    Brief Operative Note    Pre-operative diagnosis: * No pre-op diagnosis entered *  Post-operative diagnosis Same as pre-operative diagnosis    Procedure: * No procedures listed *  Surgeon: * No surgeons found in log *  Anesthesia: * No anesthesia type entered *   Estimated Blood Loss: Less than 100 ml    Drains: None  Specimens: * No specimens in log *  Findings:   None.  Complications: None.  Implants: * No implants in log *

## 2022-01-23 NOTE — PROGRESS NOTES
Brief Rounding Note    Admitted early this morning for recurrent falls at home and noted R femur fracture. Othro consulted. On my assessment patient is calm in bed. She denies R hip pain but reports groin pain from her splits. No issues breathing. No chest pain or hx of chest pain. No major anxiety and no tremors. She doesn't believe she will experience any withdrawal. LE edema present in legs which is acute/chronic in the last few weeks.     BG >400 however normal bicarb and AG. Will give 10 units of glargine with sliding scale q4h while patient is NPO. Further adjust insulin based on response.    Will obtain cardiac echo and RUQ US this hospitalization in light of new LE swelling and hx of alcohol abuse. No need to delay surgery for these procedures.    Rest of care per H&P.    Leti Michelle DO

## 2022-01-23 NOTE — PROVIDER NOTIFICATION
Notified hospitalist that pre-op called and wanted us to notify them about elevated blood sugars.

## 2022-01-23 NOTE — H&P
Admitted: 01/23/2022    CHIEF COMPLAINT:  Fall and right hip pain.    HISTORY OF PRESENT ILLNESS: Has been obtained from the patient, who is a relatively good historian.  I did discuss with ER attending, Dr. Feng, and I reviewed her chart as well.    Mrs. Lamar Menendez is a pleasant 61-year-old female with past medical history of hypertension, diabetes mellitus type 2, status post gastric bypass surgery with history of esophagitis with anastomotic ulcer, history of iron deficiency anemia, bacteremia due to Klebsiella pneumoniae, history of pneumonia and endocarditis, alcohol abuse/dependence, depression/anxiety, who presented for evaluation of right hip pain, status post 2 falls.    The patient states that yesterday around 3:00 p.m., she slipped on bathroom rugs and did a split.  She started having some pain in her left leg, she could not get up by herself.  She called 911 and took her to [facility].  Apparently, she had x-rays of her hips and there were no fractures.  I do not have records of ER visit.  Apparently, they did some blood work and she was found to be anemic.  Of note, she does have a known history of iron deficiency anemia.  Apparently, they gave her iron infusion in ER and they sent her home.  Soon after she arrived home, when she was in the front of her front door, her right leg gave out and she was about to fall, although she states that she managed to lower herself to the ground.  She started having right hip pain.  She called again 911 who brought her to ER at [facility].    The patient denies a headache.  She denies loss of consciousness or head trauma with these falls.  She does report that more recently she feels dizzy and winded and fatigued with activity, which she thinks is related to her iron deficiency anemia.  Of note, the patient has a history of a gastric bypass surgery and apparently history of esophagitis and ulcer anastomotic site.  She does take Protonix, but she also  admits that she takes a significant amount of Aleve, sometimes 4 pills once or twice daily for her right knee pain.  She knows that she should not take NSAIDs given her history. She denies seeing any blood in stool or black stools.  She denies seeing blood in urine.  No other active bleeding.  She denies any nausea or vomiting.  She denies chest pain.  No abdominal pain.  No dysuria.  It seems that more recently, she had some lower extremity swelling for which she underwent an ultrasound of the legs, which was negative for DVT.    In ER, she was seen by Dr. Feng.  Her initial vitals showed a blood pressure of 200/94 later on improved to 171/89, heart rate 98, respiratory rate 20, oxygen saturation 95-97% on room air and temperature 97.3.  She did have basic blood work, which showed a BMP with sodium of 135, potassium 3.6, chloride 101, bicarbonate 26, BUN 11, creatinine 0.78.  Her magnesium was 2.2, phosphorus 3.3.  LFTs were added later on and showed albumin of 3.3, total protein 7, total bilirubin 0.4, alkaline phosphatase 115, ALT 51, AST 91.  Her glucose was 411.  CBC with white blood cells 7.4, hemoglobin 7.9, hematocrit 28.5, platelet count 265.  Her MCV is low 68.  INR 1.01.  She was tested negative for COVID-19 infection.  She had x-ray of the pelvis and right hip, which showed acute mildly impacted basicervical fracture of the right femur with questionable nondisplaced fracture of the right superior pubic ramus.  Her EKG showed a normal sinus rhythm at the rate of 100 beats per minute.    Of note, the patient does have history of alcohol dependence.  She states that she was an alcoholic, quit in 2009.  She had been in a residential treatment and then she had been sober for 12 years.  Unfortunately, she admits that she started drinking again few months ago.  She reports drinking 2-3 alcoholic beverages daily.  She is not interested in a detox treatment at this time.    PAST MEDICAL HISTORY:     1.   "Hypertension.  2.  Diabetes mellitus type 2.  3.  History of gastric bypass surgery.  4.  History of esophagitis with anastomotic ulcer.  5.  Chronic iron deficiency anemia for which she received iron infusions and blood transfusions in the past.  Her hemoglobin was 9.7 on 01/13/2022 and it was 8.1 yesterday at [facility] ER.  6.  Alcohol abuse/dependence.  She had been in detox treatment in the past.  She had been sober for 12 years.  Unfortunately, she started drinking again a few months ago and admits to drinking 2-3 alcoholic beverages daily.  7.  History of depression/anxiety  8.  History of bacteremia due to Klebsiella pneumoniae.  9.  History of pneumonia.  10.  History of endocarditis.  11.  Depression/anxiety.  12.  Obstructive sleep apnea.    PAST SURGICAL HISTORY:   Past Surgical History:   Procedure Laterality Date     ABDOMEN SURGERY       GASTRIC BYPASS       ORTHOPEDIC SURGERY         SOCIAL HISTORY:  The patient denies smoking.  She denies illicit drug abuse.  She reports \"drinking a lot.\"  she reports drinking 2-3 alcoholic beverages daily.  At this time, she is not interested in a detox treatment.  She does not think that she had withdrawal symptoms in the past.    FAMILY HISTORY: reviewed with the patient and is noncontributory to current admission.    PRIOR TO ADMISSION MEDICATIONS: needs to be verified by the pharmacy.  Medication Sig Dispensed Refills Start Date End Date   losartan (Cozaar) 25 MG tablet   Take 1 Tablet by mouth one time a day. 30 Tablet   1 01/13/2022     furosemide (Lasix) 20 MG tablet   Take 1 Tablet by mouth one time a day. 31 Tablet   0 01/13/2022     Blood Glucose Monitoring Suppl (OneTouch Verio Flex System) w/Device Kit       0 08/15/2018     buPROPion SR (Wellbutrin SR) 200 MG 12 hour sustained release tablet   Take 1 Tablet by mouth two times a day.   0 05/01/2019     Cholecalciferol 5000 UNIT Tablet   Take by mouth.   0 01/16/2019     cyanocobalamin 250 MCG tablet   " Take by mouth.   0 01/16/2019     dulaglutide (Trulicity) 1.5 MG/0.5ML Solution Pen-injector pen injection   Inject under the skin.   0 09/04/2018     glucose blood VI test (OneTouch Verio)   OneTouch Verio In Vitro Strip check blood sugars up to 3 times daily 3 3 N Controlled type 2 diabetes mellitus E11.9 15-Aug-2018 15-Aug-2018 Ed Faria Active   0 08/15/2018     hydrOXYzine pamoate (Vistaril) 25 MG capsule   Take by mouth.   0 08/07/2020     OneTouch Delica Lancets 30G Misc       0 08/15/2018     naproxen (Naprosyn) 250 MG tablet   Take by mouth.   0 09/02/2020     traZODone (Desyrel) 100 MG tablet   Take by mouth.   0 08/15/2018     pantoprazole (Protonix) 40 MG delayed-release tablet   TAKE 1 TABLET EVERY DAY (NEED MD APPOINTMENT) 30 Tablet   0 01/07/2022     benzonatate (Tessalon) 100 MG capsule   Take 1 Capsule by mouth three times a day. Swallow capsule whole (do not break or chew). 30 Capsule   0 08/20/2021          ALLERGIES:   Allergies   Allergen Reactions     Latex          REVIEW OF SYSTEMS:  A 10-point review of systems was conducted and it was negative except for pertinent positives mentioned in history of present illness.    PHYSICAL EXAMINATION:    VITAL SIGNS:  Blood pressure is 171/89, heart rate 100, respiratory rate 20, oxygen saturation 94-97% on room air, temperature 97.3.  GENERAL:  The patient is awake, alert, in no acute distress at the time of my examination.  HEENT:  Normocephalic, atraumatic.  Pupils are equally round and reactive to light.  Oral mucosa is mildly dry.  NECK:  Supple, no cervical lymphadenopathy, no thyromegaly.  CHEST:  There is bilateral air entry.  No wheezing.  No rales.  No crackles.  CARDIOVASCULAR:  There is normal S1 and S2, mild tachycardia, regular rate and rhythm.  No murmurs, no rubs.  ABDOMEN:  Soft, nontender, nondistended.  Bowel sounds are present.  EXTREMITIES:  She has trace edema bilateral lower extremities, left more than the right.  No calf  tenderness.  SKIN:  Intact.  No rash, no cyanosis.  NEUROLOGIC:  The patient is awake, alert, oriented to self, place and time.  There are no focal neurological deficits.  PSYCHIATRIC:  Normal mood, normal affect. She is not anxious at this time.  MUSCULOSKELETAL:  She has limited range of motion of the right hip because of the pain and her right lower extremity seems slightly short and externally rotated.    LABORATORY DATA:  Reviewed and dictated above.    IMPRESSION AND PLAN:  Mrs. Lamar Menendez is a pleasant 61-year-old female with a past medical history of hypertension, diabetes mellitus type 2, history of alcohol abuse/dependence gastric bypass surgery with history of esophagitis and anastomotic ulcer, history of iron deficiency anemia, depression/anxiety, posttraumatic stress disorder, bacteremia, pneumonia, bilateral pleural effusion, who was brought in for evaluation of right hip pain, status post a fall.    PLAN:     1.  Acute mildly impacted basicervical fracture of the right femur.  2. Questionable nondisplaced fracture of the right superior pubic ramus.  3. Two mechanical falls.    The patient reports that she had a mechanical fall yesterday around 3:00 p.m. when she slipped on bathroom rugs.  She did a split.  She was taken by EMS to [facility].  She had a hip x-ray which did not show any acute fractures.  She was found to have worsening of her chronic anemia.  She was given IV iron infusion and she was discharged home.  She had another fall after she got home as her right leg gave up.  At that time, she started having right hip pain.  X-ray of the pelvis in ER at [facility] shows acute mildly impacted basicervical fracture of the right femur with questionable nondisplaced fracture of the right superior pubic ramus. She will need to go to the OR.  She does not have cardiac problems.  Her EKG shows normal sinus rhythm at the rate of 100 beats per minute.  She does have worsening of her chronic iron  deficiency anemia.  Hemoglobin today 7.9, MCV is 68.  Her hemoglobin back in 01/13/2022 was 9.7.  She seems to have a possible ongoing GI bleeding given her history of esophagitis with anastomotic ulcer and concurrent use of Aleve.  She was given one IV iron infusion at [facility].  She is hemodynamically stable.  She does not need a blood transfusion at this time.  She may need a blood transfusion during the surgery or after the surgery.  She is admitted by Hospitalist Service.  We will keep her on bed rest at this time.  We will keep her n.p.o.  Stanford catheter in place.  Ortho consult requested.  Pain management and bowel regimen ordered.  Regarding DVT prophylaxis with pneumatic compression device for now.  Given her anemia and history of alcohol abuse/dependence, she is at risk to develop postoperative complications such as worsening anemia and possible withdrawal symptoms with delirium, agitation.  She seems euvolemic.  She does not need further preoperative evaluation, but she will need close monitoring after the surgery.    4.  Acute on chronic iron deficiency anemia.  5.  Symptomatic anemia.  6.  Possible gastrointestinal bleeding.  7.  History of esophagitis and anastomotic ulcer.    She has a history of gastric bypass surgery.  Apparently, she has a history of anastomotic ulcer.  She is on Protonix 40 mg p.o. daily.  Unfortunately, she continues to take Aleve 1-4 pills once to twice daily, although she is aware that she is not supposed to use NSAIDs. She does not use aspirin, though she continues to drink.  She denies abdominal pain.  She denies bloody stools or black stools.  She does report that more recently she started having episodes of dizziness, fatigue and shortness of breath with exertion, which she thinks is related to low hemoglobin.  Her hemoglobin today is 7.9.  Her hemoglobin yesterday at [facility] was 8.1 and it had been 9.7 in 01/13/2012.  She reports that she received IV iron infusion at  [facility].  I will not check her iron studies at this time as it would not be accurate given her recent IV iron infusion.  We will avoid NSAIDs.  This was discussed with the patient.  She states that she is aware that she should not use Aleve. Will avoid aspirin as well.  Her vitals are stable.  Blood pressure is good. Plan for now is to start her on IV Protonix 40 mg twice daily and a GI consult by Dr. Isbell requested.  She eventually will need EGD, possible after she will have her hip fracture surgery.    8.  Hypertension:  Her blood pressure seems to be on higher side.  Her medications needs to be verified by the pharmacy, but apparently she is on losartan 25 mg p.o. daily, which will be resumed at this time.  Hydralazine IV p.r.n. had been ordered for elevated blood pressures.  We will monitor blood pressure.    9.  History of diabetes mellitus type 2, on Trulicity, which will be held at this time.  We will check hemoglobin A1c.  We will check her blood sugars before every 4 hours given n.p.o. status and insulin sliding scale had been ordered.    10.  Alcohol abuse/dependence.  She has long history of alcohol dependence.  She states that she drank heavily since 2005 through 2009, and then she had been in a residential treatment and she had been sober for 12 years.  Unfortunately, she started drinking again a few months ago.  She reports drinking 2-3 alcoholic beverages daily.  She is not interested in alcohol detoxification treatment at this time.  She is not sure if she had withdrawal symptoms in the past, but she states that \"she will be doing fine.\" She does not have any tremor of extremities.  At this time, she is not confused.  She is mildly tachycardic though.  Her magnesium and phosphorus are fine.  We will monitor her on a CIWA.  I did not order benzodiazepine at this time.  We will start her on multivitamins.  We will closely monitor for withdrawal symptoms and if she scores on a CIWA will start her on " symptom triggered benzos.     11.  Mild transaminitis    Her ALT is 51, AST 91, alcoholic pattern.  Fortunately, she is not coagulopathic.  INR is 1.01, platelet count is 265. She should stop drinking alcohol, but again she is not interested in alcohol drinking cessation  at this time.  We will repeat LFTs in the morning.    12.  History of depression/anxiety:  It seems that she is on Wellbutrin and Vistaril p.r.n.  We will resume this when the medications will be verified by the pharmacy.      13.  Deep venous thrombosis prophylaxis:  Pneumatic compression device for now.  We will defer DVT prophylaxis in postop period to Ortho Service.  It will also depend on GI recommendations.    14.  Code status discussed with the patient.  The patient wishes to be DNR/DNI.  Of note, she wishes to continue to be DNR/DNI during her surgery.  I advised her to further discuss this issue with her ortho team.    DISPOSITION:  Admit inpatient.  I anticipate 3-4 days of hospitalization.    Mildred Martinez MD        D: 2022   T: 2022   MT: [id number]    Name:     RENE RODRÍGUEZ  MRN:      [id number]        Account:     [id number]   :      1960           Admitted:    2022       Document: [id number]

## 2022-01-23 NOTE — PROGRESS NOTES
.RECEIVING UNIT ED HANDOFF REVIEW    ED Nurse Handoff Report was reviewed by: Jaky Vogel RN on January 23, 2022 at 2:59 AM

## 2022-01-23 NOTE — ED NOTES
Bed: ED09  Expected date:   Expected time:   Means of arrival:   Comments:   61F hip injury from fall eta 0007

## 2022-01-23 NOTE — ED PROVIDER NOTES
History     Chief Complaint:  Fall       HPI   Lamar Menendez is a 61 year old female who presents with right hip pain.  She reports that earlier in the day she had slipped and fallen and strained her groin on her left leg.  She was seen at [address] [place] [address] and had a reassuring evaluation.  She had gone back home and reports that she her leg then went out from under her and she fell onto her right hip causing pain.  She denies any other injuries.    Allergies:  Latex     Medications:    No current outpatient medications on file.      Past Medical History:    No past medical history on file.    There are no problems to display for this patient.       Past Surgical History:    No past surgical history on file.     Family History:    family history is not on file.    Social History:  Drinks EtOH    PCP: No primary care provider on file.     Review of Systems   Respiratory: Negative for shortness of breath.    Cardiovascular: Negative for chest pain.   Gastrointestinal: Negative for abdominal pain.   Musculoskeletal: Positive for arthralgias.   Neurological: Negative for numbness.   All other systems reviewed and are negative.        Physical Exam   Patient Vitals for the past 24 hrs:   BP Temp Temp src Pulse Resp SpO2   01/23/22 0024 (!) 200/94 97.3  F (36.3  C) Oral 97 22 97 %        Physical Exam  General: Appears well-developed and well-nourished.   Head: No signs of trauma.   CV: Normal rate and regular rhythm.    Resp: Effort normal and breath sounds normal. No respiratory distress.   GI: Soft. There is no tenderness.  No rebound or guarding.  Normal bowel sounds.    MSK: +tenderness to right hip and pain with ROM. No tenderness to remainder of leg.  Distal pulses intact.  Normal strength, cap refill, and sensation distally.  Neuro: The patient is alert and oriented. Speech normal.  Skin: Skin is warm and dry. No rash noted.   Psych: normal mood and affect. behavior is normal.     Emergency Department Course        Imaging:  XR Pelvis w Hip Right 1 View   Final Result   IMPRESSION: Acute mildly impacted basicervical fracture of the right femur. Questionable nondisplaced fracture of the right superior pubic ramus. No dislocation. Bones are demineralized. Right femoral fixation plate and screws incompletely imaged.    Extensive peripheral arterial calcifications.         All imaging results were discussed with the patient who voiced understanding of the findings.    Laboratory:  Labs Ordered and Resulted from Time of ED Arrival to Time of ED Departure   BASIC METABOLIC PANEL - Abnormal       Result Value    Sodium 135      Potassium 3.6      Chloride 101      Carbon Dioxide (CO2) 26      Anion Gap 8      Urea Nitrogen 11      Creatinine 0.78      Calcium 8.2 (*)     Glucose 411 (*)     GFR Estimate 85     CBC WITH PLATELETS AND DIFFERENTIAL - Abnormal    WBC Count 7.4      RBC Count 4.17      Hemoglobin 7.9 (*)     Hematocrit 28.5 (*)     MCV 68 (*)     MCH 18.9 (*)     MCHC 27.7 (*)     RDW 19.3 (*)     Platelet Count 265      % Neutrophils 85      % Lymphocytes 8      % Monocytes 6      % Eosinophils 1      % Basophils 0      % Immature Granulocytes 0      NRBCs per 100 WBC 0      Absolute Neutrophils 6.3      Absolute Lymphocytes 0.6 (*)     Absolute Monocytes 0.4      Absolute Eosinophils 0.1      Absolute Basophils 0.0      Absolute Immature Granulocytes 0.0      Absolute NRBCs 0.0     INR - Normal    INR 1.01     PARTIAL THROMBOPLASTIN TIME - Normal    aPTT 26     COVID-19 VIRUS (CORONAVIRUS) BY PCR - Normal    SARS CoV2 PCR Negative     TYPE AND SCREEN, ADULT    ABO/RH(D) A POS      Antibody Screen Negative      SPECIMEN EXPIRATION DATE [id number]     ABO/RH TYPE AND SCREEN        Interventions:  Medications   fentaNYL (PF) (SUBLIMAZE) injection 100 mcg (100 mcg Intravenous Given 1/23/22 0050)   HYDROmorphone (PF) (DILAUDID) injection 0.5 mg (has no administration in time range)        Emergency Department  Course:  Past medical records, nursing notes, and vitals reviewed.  I performed an exam of the patient and obtained history, as documented above.    I rechecked the patient. Findings and plan explained to the Patient. Patient was admitted.    Impression & Plan      Medical Decision Making:  Patient presents after a fall.  She had a fall earlier in the day straining her left leg.  When she got home she felt like her leg gave out on her causing her to fall onto her right hip.  X-rays were obtained that did show a right hip fracture.  Blood work did show the patient to be anemic, but this looks to be chronic in nature.  Patient was admitted to the hospitalist service for continued care.    Diagnosis:    ICD-10-CM    1. Closed fracture of right hip, initial encounter (H)  S72.001A           1/23/2022   Deven Feng MD [place], John A, MD  01/23/22 0440

## 2022-01-23 NOTE — CONSULTS
Attempted to see pt but it seems like pt is in surgery/procedure.    In summary:  71-year-old female with hypertension, poorly controlled diabetes, prior history of gastric bypass with complication of esophagitis and anastomosis site ulcer, chronic iron deficiency anemia, alcohol abuse, depression and anxiety who presented with femur fracture after fall.  During the admission, she was also found to have anemia with a hemoglobin of 7.6 with prior to be 9.7.  Patient is taking PPI daily but also using Aleve on daily basis.  -Continue with IV PPI twice daily  -N.p.o. after midnight for possible EGD tomorrow  -Unable to see patient given in procedure/surgery      Cheryl Sidhu MD (Richard)  Provider's Cell: [phone]     [facility] GI consultant [facility]  [phone]

## 2022-01-23 NOTE — PLAN OF CARE
Handed report off to OR RN and CRNA, blood still transfusing, AVSS, tolerating transfusion well.

## 2022-01-23 NOTE — CONSULTS
Consult Date: 01/23/2022    ORTHOPEDIC CONSULTATION    REASON FOR CONSULTATION:  I was asked to provide Orthopedic consultation for this 61-year-old female who fell and sustained a right hip fracture.      PAST MEDICAL HISTORY:  History of hypertension, type 2 diabetes, status post gastric bypass surgery, chronic iron deficiency anemia, history of depression, history of pneumonia, history of endocarditis, history of obstructive sleep apnea.  You can refer to admission history and physical for the specifics of her past medical history and medications.    REVIEW OF SYSTEMS:  She denies recent fever, chills, nausea, vomiting, diarrhea, chest pain, shortness of breath.    PHYSICAL EXAMINATION:    GENERAL:  She is a 61-year-old woman who is alert and oriented x 3.  She is pleasant, in no acute distress.    EXTREMITIES:  She has painless range of motion of the upper extremities:  She has a benign appearing surgical scar about her right knee and about the lateral aspect of her right thigh.  She is able to flex and extend the ankle and toes bilaterally.  Her skin is intact in both lower extremities.  Both lower extremities are well perfused.    CARDIOVASCULAR:  Demonstrates regular rate and rhythm.  ABDOMEN:  Benign, abdomen is soft.     RADIOGRAPHS:  X-rays were reviewed; AP pelvis and lateral of the right hip show an impacted femoral neck fracture, also with osteopenia and some arthritic changes of the hip joint.  Also shows a lateral plate for the distal femur fracture.    ASSESSMENT:  A 61-year-old female with a right base of the femoral neck fracture.  I discussed treatment options, recommended open reduction and internal fixation.  Due to having the lateral plate we will probably use either a 2 or 3-hole compression hip screw and side plate.  I have explained the risks, benefits, and potential complications to the patient and the plan is to proceed today.  She has been cleared by medicine.    Nataly Avendano,  MD        D: 2022   T: 2022   MT: AUGUST    Name:     RENE RODRÍGUEZ  MRN:      [id number]        Account:      [id number]   :      1960           Consult Date: 2022     Document: [id number]

## 2022-01-23 NOTE — OP NOTE
Procedure Date: 01/23/2022    PREOPERATIVE DIAGNOSES:  Right base of the neck femoral neck fracture.    POSTOPERATIVE DIAGNOSIS:  Right base of the neck femoral neck fracture.    PROCEDURE PERFORMED:  Open reduction and internal fixation using a Synthes 130 degree 3-hole compression hip screw.    INDICATIONS FOR PROCEDURE:  The patient is a 61-year-old diabetic female with multiple medical problems who fell earlier today and sustained a right base of the neck/intertrochanteric hip fracture.  She had also had a distal femur fracture that was treated with a long plate.  The plate extended all the way up near the lesser trochanter.  I discussed the difficult nature of this problem with the patient and recommended the above-stated procedure and for this reason, I elected to proceed with a 3-hole plate because this was a plate that would fit.  I explained the risks, benefits, and potential complications of surgery.  Discussion included but not specific to infection, vascular, neurologic complications, DVT, nonunion, malunion, possible development of arthritic changes of the hip and the possible need for further revision surgery.  After this discussion, the patient wanted to proceed with surgery.    ANESTHETIC USED:  General.    ASSISTANT:  Kain Martin PA-C    DESCRIPTION OF PROCEDURE:  The patient was taken to the operating room and placed on the table in supine position.  After adequate induction of general anesthetic, he was transferred to the fracture table.  Care was taken to pad all bony prominences.  Longitudinal traction was applied to the right leg and the C-arm was brought in and we had essentially an anatomic reduction in both AP and lateral planes.  We then performed a sterile prep and drape, the right hip and right lower extremity.  The patient was given 3 g of Ancef for infection prophylaxis given 1 hour prior to incision.  After sterile prep and drape, C-arm was brought in.  We identified the proper leg and  proceeded to make an incision.  We sharply dissected down onto the tensor fascia and divided it in line with its fibers and then retracted the vastus lateralis anteriorly and divided it in line with its fibers, did a subperiosteal exposure of the proximal femur.  Hemostasis was obtained with electrocautery.  I then placed a guidewire in the  position, this was verified using the C-arm.  Drilled and placed the appropriate length screws to fill 130 degree 3-hole compression hip screw with sideplate.  We did use an 85 mm compression screw.  Once the screw was fully seated, we applied the 3-hole plate and filled the plate with the appropriate length screws.  This allowed for rigid fixation.  There was no evidence of screw penetration into the joint.  The inferior aspect of the 3-hole plate, butted right up the superior aspect of the long lateral plate from the distal femur fracture that had been previously treated.  We then irrigated and repaired the vastus lateralis using 0 Vicryl, closed the tensor fascia using 0 Vicryl, subQ was closed in multiple layers using 0 Vicryl, superficial subQ closed with 2-0 Vicryl and skin was closed with staples.  With each layer of closure, the wound was copiously irrigated with antibiotic saline.  Sterile dressing applied.  The patient tolerated the procedure.  There were no intraoperative complications.  The patient sent to [facility] in stable condition.    Plan will be for patient to get 24 hours of Ancef for infection prophylaxis, 30 days of aspirin for DVT prophylaxis.  She will be toe touch weightbearing for the next 6 weeks.    Nataly Avendano MD        D: 2022   T: 2022   MT: AUGUST    Name:     RENE RODRÍGUEZ  MRN:      [id number]        Account:        [id number]   :      1960           Procedure Date: 2022     Document: [id number]

## 2022-01-23 NOTE — ANESTHESIA PROCEDURE NOTES
Airway       Patient location during procedure: OR       Procedure Start/Stop Times: 1/23/2022 3:44 PM  Staff -        Anesthesiologist:  Ahsan Downs MD       CRNA: Maame Bradshaw APRN CRNA       Performed By: anesthesiologist and CRNA  Consent for Airway        Urgency: elective  Indications and Patient Condition       Indications for airway management: elias-procedural       Induction type:intravenous       Mask difficulty assessment: 1 - vent by mask    Final Airway Details       Final airway type: endotracheal airway       Successful airway: ETT - single  Endotracheal Airway Details        ETT size (mm): 7.0       Cuffed: yes       Successful intubation technique: direct laryngoscopy       DL Blade Type: MAC 3       Grade View of Cords: 1       Adjucts: stylet       Position: Right       Measured from: lips       Secured at (cm): 22       Bite block used: None    Post intubation assessment        Placement verified by: capnometry, equal breath sounds and chest rise        Number of attempts at approach: 1       Number of other approaches attempted: 0       Secured with: pink tape       Ease of procedure: easy

## 2022-01-23 NOTE — PROVIDER NOTIFICATION
Pt continuously rates pain 10/10, states right groin area same as prior to her procedure was also 10/10. Pain not at surgical site. Rests comfortably, sleeping between cares and able to converse easily with writer. Will notify anesthesia before sending pt to floor.

## 2022-01-23 NOTE — ED NOTES
[facility]  ED Nurse Handoff Report    ED Chief complaint: Fall      ED Diagnosis:   Final diagnoses:   Closed fracture of right hip, initial encounter (H)       Code Status: Full Code    Allergies:   Allergies   Allergen Reactions     Latex        Patient Story: pt comes to ED for eval of R hip pain after ground level fall earlier tonight.   Focused Assessment:  R hip pain, no deformities noted, CMS intact in all extremities.     Treatments and/or interventions provided: pain med, hip xray   Patient's response to treatments and/or interventions: pain is managed well at this time     XR Pelvis w Hip Right 1 View   Final Result   IMPRESSION: Acute mildly impacted basicervical fracture of the right femur. Questionable nondisplaced fracture of the right superior pubic ramus. No dislocation. Bones are demineralized. Right femoral fixation plate and screws incompletely imaged.    Extensive peripheral arterial calcifications.        Labs Ordered and Resulted from Time of ED Arrival to Time of ED Departure   BASIC METABOLIC PANEL - Abnormal       Result Value    Sodium 135      Potassium 3.6      Chloride 101      Carbon Dioxide (CO2) 26      Anion Gap 8      Urea Nitrogen 11      Creatinine 0.78      Calcium 8.2 (*)     Glucose 411 (*)     GFR Estimate 85     CBC WITH PLATELETS AND DIFFERENTIAL - Abnormal    WBC Count 7.4      RBC Count 4.17      Hemoglobin 7.9 (*)     Hematocrit 28.5 (*)     MCV 68 (*)     MCH 18.9 (*)     MCHC 27.7 (*)     RDW 19.3 (*)     Platelet Count 265      % Neutrophils 85      % Lymphocytes 8      % Monocytes 6      % Eosinophils 1      % Basophils 0      % Immature Granulocytes 0      NRBCs per 100 WBC 0      Absolute Neutrophils 6.3      Absolute Lymphocytes 0.6 (*)     Absolute Monocytes 0.4      Absolute Eosinophils 0.1      Absolute Basophils 0.0      Absolute Immature Granulocytes 0.0      Absolute NRBCs 0.0     INR - Normal    INR 1.01     PARTIAL THROMBOPLASTIN TIME -  Normal    aPTT 26     COVID-19 VIRUS (CORONAVIRUS) BY PCR   TYPE AND SCREEN, ADULT    ABO/RH(D) A POS      Antibody Screen Negative      SPECIMEN EXPIRATION DATE [id number]     ABO/RH TYPE AND SCREEN         To be done/followed up on inpatient unit:  surg consult     Does this patient have any cognitive concerns?: AO4    Activity level - Baseline/Home:  Independent and Cane  Activity Level - Current:   Total Care bed rest    Patient's Preferred language: English   Needed?: No    Isolation: None  Infection: Not Applicable  Patient tested for COVID 19 prior to admission: YES  Bariatric?: No    Vital Signs:   Vitals:    01/23/22 0024 01/23/22 0200   BP: (!) 200/94 (!) 193/105   BP Location: Right arm    Patient Position: Supine    Pulse: 97 100   Resp: 22    Temp: 97.3  F (36.3  C)    TempSrc: Oral    SpO2: 97% 95%       Cardiac Rhythm:     Was the PSS-3 completed:   Yes  What interventions are required if any?               Family Comments:   OBS brochure/video discussed/provided to patient/family: N/A              Name of person given brochure if not patient:               Relationship to patient:     For the majority of the shift this patient's behavior was Green.   Behavioral interventions performed were .    ED NURSE PHONE NUMBER: ED RN 6

## 2022-01-23 NOTE — ED TRIAGE NOTES
Pt BIBA from home, pt was seen in another ED earlier tonight for leg cramps. Pt was DCed pt was about to walk into her house when R leg gave out and pt fell. Pt landed on her R hip. Denies head injury, denies LOC. Not on thinners.    100 of fentanyl and 10 of ketamine given by EMS  
Adore Cisneros

## 2022-01-23 NOTE — ANESTHESIA CARE TRANSFER NOTE
Patient: Lamar Menendez    Procedure: Procedure(s):  OPEN REDUCTION INTERNAL FIXATION RIGHT HIP       Diagnosis: Closed hip fracture (H) [S72.009A]  Diagnosis Additional Information: No value filed.    Anesthesia Type:   General     Note:    Oropharynx: oropharynx clear of all foreign objects and spontaneously breathing  Level of Consciousness: awake  Oxygen Supplementation: face mask  Level of Supplemental Oxygen (L/min / FiO2): 10  Independent Airway: airway patency satisfactory and stable  Dentition: dentition unchanged  Vital Signs Stable: post-procedure vital signs reviewed and stable  Report to RN Given: handoff report given  Patient transferred to: PACU  Comments: Transferred to PACU, spontaneous respirations, 10L oxygen via facemask.  All monitors and alarms on and functioning, VSS.  Patient awake, comfortable.  Report to PACU RN.  Handoff Report: Identifed the Patient, Identified the Reponsible Provider, Reviewed the pertinent medical history, Discussed the surgical course, Reviewed Intra-OP anesthesia mangement and issues during anesthesia, Set expectations for post-procedure period and Allowed opportunity for questions and acknowledgement of understanding      Vitals:  Vitals Value Taken Time   /82 01/23/22 1709   Temp     Pulse 95 01/23/22 1712   Resp 12 01/23/22 1712   SpO2 97 % 01/23/22 1712   Vitals shown include unvalidated device data.    Electronically Signed By: RONAN Perez CRNA  January 23, 2022  5:13 PM

## 2022-01-24 ENCOUNTER — APPOINTMENT (OUTPATIENT)
Dept: PHYSICAL THERAPY | Facility: CLINIC | Age: 62
DRG: 481 | End: 2022-01-24
Attending: PHYSICIAN ASSISTANT
Payer: COMMERCIAL

## 2022-01-24 ENCOUNTER — APPOINTMENT (OUTPATIENT)
Dept: GENERAL RADIOLOGY | Facility: CLINIC | Age: 62
DRG: 481 | End: 2022-01-24
Attending: PHYSICIAN ASSISTANT
Payer: COMMERCIAL

## 2022-01-24 ENCOUNTER — APPOINTMENT (OUTPATIENT)
Dept: CARDIOLOGY | Facility: CLINIC | Age: 62
DRG: 481 | End: 2022-01-24
Attending: STUDENT IN AN ORGANIZED HEALTH CARE EDUCATION/TRAINING PROGRAM
Payer: COMMERCIAL

## 2022-01-24 LAB
ALBUMIN SERPL-MCNC: 3 G/DL (ref 3.4–5)
ALP SERPL-CCNC: 121 U/L (ref 40–150)
ALT SERPL W P-5'-P-CCNC: 38 U/L (ref 0–50)
ANION GAP SERPL CALCULATED.3IONS-SCNC: 6 MMOL/L (ref 3–14)
AST SERPL W P-5'-P-CCNC: 55 U/L (ref 0–45)
BILIRUB DIRECT SERPL-MCNC: 0.1 MG/DL (ref 0–0.2)
BILIRUB SERPL-MCNC: 0.6 MG/DL (ref 0.2–1.3)
BUN SERPL-MCNC: 16 MG/DL (ref 7–30)
CALCIUM SERPL-MCNC: 8.1 MG/DL (ref 8.5–10.1)
CHLORIDE BLD-SCNC: 103 MMOL/L (ref 94–109)
CO2 SERPL-SCNC: 26 MMOL/L (ref 20–32)
CREAT SERPL-MCNC: 1 MG/DL (ref 0.52–1.04)
GFR SERPL CREATININE-BSD FRML MDRD: 63 ML/MIN/1.73M2
GLUCOSE BLD-MCNC: 229 MG/DL (ref 70–99)
GLUCOSE BLDC GLUCOMTR-MCNC: 200 MG/DL (ref 70–99)
GLUCOSE BLDC GLUCOMTR-MCNC: 216 MG/DL (ref 70–99)
GLUCOSE BLDC GLUCOMTR-MCNC: 216 MG/DL (ref 70–99)
GLUCOSE BLDC GLUCOMTR-MCNC: 223 MG/DL (ref 70–99)
GLUCOSE BLDC GLUCOMTR-MCNC: 241 MG/DL (ref 70–99)
HBA1C MFR BLD: 9.4 % (ref 0–5.6)
HGB BLD-MCNC: 8.4 G/DL (ref 11.7–15.7)
LVEF ECHO: NORMAL
POTASSIUM BLD-SCNC: 3.5 MMOL/L (ref 3.4–5.3)
PROT SERPL-MCNC: 7 G/DL (ref 6.8–8.8)
SODIUM SERPL-SCNC: 135 MMOL/L (ref 133–144)

## 2022-01-24 PROCEDURE — 250N000013 HC RX MED GY IP 250 OP 250 PS 637: Performed by: PHYSICIAN ASSISTANT

## 2022-01-24 PROCEDURE — C9113 INJ PANTOPRAZOLE SODIUM, VIA: HCPCS | Performed by: INTERNAL MEDICINE

## 2022-01-24 PROCEDURE — 85018 HEMOGLOBIN: CPT | Performed by: PHYSICIAN ASSISTANT

## 2022-01-24 PROCEDURE — 250N000011 HC RX IP 250 OP 636: Performed by: STUDENT IN AN ORGANIZED HEALTH CARE EDUCATION/TRAINING PROGRAM

## 2022-01-24 PROCEDURE — 250N000013 HC RX MED GY IP 250 OP 250 PS 637: Performed by: INTERNAL MEDICINE

## 2022-01-24 PROCEDURE — 250N000011 HC RX IP 250 OP 636: Performed by: INTERNAL MEDICINE

## 2022-01-24 PROCEDURE — 93306 TTE W/DOPPLER COMPLETE: CPT | Mod: 26 | Performed by: INTERNAL MEDICINE

## 2022-01-24 PROCEDURE — 250N000013 HC RX MED GY IP 250 OP 250 PS 637: Performed by: STUDENT IN AN ORGANIZED HEALTH CARE EDUCATION/TRAINING PROGRAM

## 2022-01-24 PROCEDURE — 250N000011 HC RX IP 250 OP 636: Performed by: PHYSICIAN ASSISTANT

## 2022-01-24 PROCEDURE — 97161 PT EVAL LOW COMPLEX 20 MIN: CPT | Mod: GP | Performed by: PHYSICAL THERAPIST

## 2022-01-24 PROCEDURE — 999N000208 ECHOCARDIOGRAM COMPLETE

## 2022-01-24 PROCEDURE — 255N000002 HC RX 255 OP 636: Performed by: INTERNAL MEDICINE

## 2022-01-24 PROCEDURE — 80053 COMPREHEN METABOLIC PANEL: CPT | Performed by: INTERNAL MEDICINE

## 2022-01-24 PROCEDURE — 83036 HEMOGLOBIN GLYCOSYLATED A1C: CPT | Performed by: INTERNAL MEDICINE

## 2022-01-24 PROCEDURE — 82248 BILIRUBIN DIRECT: CPT | Performed by: INTERNAL MEDICINE

## 2022-01-24 PROCEDURE — 99232 SBSQ HOSP IP/OBS MODERATE 35: CPT | Performed by: STUDENT IN AN ORGANIZED HEALTH CARE EDUCATION/TRAINING PROGRAM

## 2022-01-24 PROCEDURE — 120N000001 HC R&B MED SURG/OB

## 2022-01-24 PROCEDURE — 36415 COLL VENOUS BLD VENIPUNCTURE: CPT | Performed by: INTERNAL MEDICINE

## 2022-01-24 PROCEDURE — 97530 THERAPEUTIC ACTIVITIES: CPT | Mod: GP | Performed by: PHYSICAL THERAPIST

## 2022-01-24 PROCEDURE — 72100 X-RAY EXAM L-S SPINE 2/3 VWS: CPT

## 2022-01-24 RX ORDER — METHOCARBAMOL 500 MG/1
500 TABLET, FILM COATED ORAL 4 TIMES DAILY PRN
Status: DISCONTINUED | OUTPATIENT
Start: 2022-01-24 | End: 2022-01-28

## 2022-01-24 RX ORDER — LOSARTAN POTASSIUM 25 MG/1
25 TABLET ORAL DAILY
Status: DISCONTINUED | OUTPATIENT
Start: 2022-01-25 | End: 2022-01-27

## 2022-01-24 RX ORDER — BUPROPION HYDROCHLORIDE 200 MG/1
200 TABLET, EXTENDED RELEASE ORAL 2 TIMES DAILY
COMMUNITY

## 2022-01-24 RX ORDER — FUROSEMIDE 10 MG/ML
40 INJECTION INTRAMUSCULAR; INTRAVENOUS ONCE
Status: COMPLETED | OUTPATIENT
Start: 2022-01-24 | End: 2022-01-24

## 2022-01-24 RX ORDER — DULOXETIN HYDROCHLORIDE 60 MG/1
60 CAPSULE, DELAYED RELEASE ORAL DAILY
Status: ON HOLD | COMMUNITY
End: 2022-01-24

## 2022-01-24 RX ORDER — BUPROPION HYDROCHLORIDE 200 MG/1
200 TABLET, EXTENDED RELEASE ORAL 2 TIMES DAILY
Status: DISCONTINUED | OUTPATIENT
Start: 2022-01-24 | End: 2022-02-02 | Stop reason: HOSPADM

## 2022-01-24 RX ORDER — CALCIUM CARBONATE 500 MG/1
500 TABLET, CHEWABLE ORAL 3 TIMES DAILY PRN
Status: DISCONTINUED | OUTPATIENT
Start: 2022-01-24 | End: 2022-02-02 | Stop reason: HOSPADM

## 2022-01-24 RX ORDER — PANTOPRAZOLE SODIUM 40 MG/1
40 TABLET, DELAYED RELEASE ORAL DAILY
Status: ON HOLD | COMMUNITY
End: 2022-02-01

## 2022-01-24 RX ORDER — FUROSEMIDE 20 MG
20 TABLET ORAL DAILY
Status: ON HOLD | COMMUNITY
End: 2022-02-01

## 2022-01-24 RX ORDER — TRAZODONE HYDROCHLORIDE 100 MG/1
100 TABLET ORAL
Status: ON HOLD | COMMUNITY
End: 2022-02-25

## 2022-01-24 RX ORDER — LOSARTAN POTASSIUM 25 MG/1
25 TABLET ORAL DAILY
Status: ON HOLD | COMMUNITY
End: 2022-02-01

## 2022-01-24 RX ORDER — CALCIUM CARBONATE 500 MG/1
500 TABLET, CHEWABLE ORAL DAILY PRN
Status: DISCONTINUED | OUTPATIENT
Start: 2022-01-24 | End: 2022-01-24

## 2022-01-24 RX ORDER — NAPROXEN SODIUM 220 MG
220 TABLET ORAL 2 TIMES DAILY PRN
COMMUNITY
End: 2022-02-21

## 2022-01-24 RX ORDER — KETOROLAC TROMETHAMINE 15 MG/ML
15 INJECTION, SOLUTION INTRAMUSCULAR; INTRAVENOUS EVERY 6 HOURS
Status: COMPLETED | OUTPATIENT
Start: 2022-01-24 | End: 2022-01-24

## 2022-01-24 RX ORDER — PANTOPRAZOLE SODIUM 40 MG/1
40 TABLET, DELAYED RELEASE ORAL
Status: DISCONTINUED | OUTPATIENT
Start: 2022-01-24 | End: 2022-02-02 | Stop reason: HOSPADM

## 2022-01-24 RX ORDER — TRAZODONE HYDROCHLORIDE 100 MG/1
100 TABLET ORAL
Status: DISCONTINUED | OUTPATIENT
Start: 2022-01-24 | End: 2022-02-02 | Stop reason: HOSPADM

## 2022-01-24 RX ORDER — HYDROXYZINE PAMOATE 25 MG/1
25 CAPSULE ORAL EVERY 6 HOURS PRN
Status: DISCONTINUED | OUTPATIENT
Start: 2022-01-24 | End: 2022-01-24

## 2022-01-24 RX ORDER — BENZONATATE 100 MG/1
100 CAPSULE ORAL 3 TIMES DAILY PRN
COMMUNITY

## 2022-01-24 RX ORDER — HYDROXYZINE PAMOATE 25 MG/1
25 CAPSULE ORAL EVERY 6 HOURS PRN
COMMUNITY
End: 2022-02-21

## 2022-01-24 RX ADMIN — HYDROMORPHONE HYDROCHLORIDE 0.4 MG: 0.2 INJECTION, SOLUTION INTRAMUSCULAR; INTRAVENOUS; SUBCUTANEOUS at 04:32

## 2022-01-24 RX ADMIN — MULTIPLE VITAMINS W/ MINERALS TAB 1 TABLET: TAB at 08:35

## 2022-01-24 RX ADMIN — BUPROPION HYDROCHLORIDE 200 MG: 200 TABLET, EXTENDED RELEASE ORAL at 12:47

## 2022-01-24 RX ADMIN — CALCIUM CARBONATE (ANTACID) CHEW TAB 500 MG 500 MG: 500 CHEW TAB at 21:17

## 2022-01-24 RX ADMIN — ASPIRIN 325 MG: 325 TABLET, DELAYED RELEASE ORAL at 08:35

## 2022-01-24 RX ADMIN — OXYCODONE HYDROCHLORIDE 10 MG: 5 TABLET ORAL at 19:39

## 2022-01-24 RX ADMIN — CALCIUM CARBONATE (ANTACID) CHEW TAB 500 MG 500 MG: 500 CHEW TAB at 09:12

## 2022-01-24 RX ADMIN — HUMAN ALBUMIN MICROSPHERES AND PERFLUTREN 9 ML: 10; .22 INJECTION, SOLUTION INTRAVENOUS at 09:09

## 2022-01-24 RX ADMIN — ACETAMINOPHEN 975 MG: 325 TABLET, FILM COATED ORAL at 04:32

## 2022-01-24 RX ADMIN — LOSARTAN POTASSIUM 25 MG: 25 TABLET, FILM COATED ORAL at 08:35

## 2022-01-24 RX ADMIN — PANTOPRAZOLE SODIUM 40 MG: 40 INJECTION, POWDER, FOR SOLUTION INTRAVENOUS at 08:35

## 2022-01-24 RX ADMIN — THIAMINE HCL TAB 100 MG 100 MG: 100 TAB at 08:35

## 2022-01-24 RX ADMIN — CEFAZOLIN 1 G: 1 INJECTION, POWDER, FOR SOLUTION INTRAMUSCULAR; INTRAVENOUS at 00:34

## 2022-01-24 RX ADMIN — OXYCODONE HYDROCHLORIDE 10 MG: 5 TABLET ORAL at 12:48

## 2022-01-24 RX ADMIN — HYDROXYZINE HYDROCHLORIDE 25 MG: 25 TABLET ORAL at 04:32

## 2022-01-24 RX ADMIN — KETOROLAC TROMETHAMINE 15 MG: 15 INJECTION, SOLUTION INTRAMUSCULAR; INTRAVENOUS at 14:26

## 2022-01-24 RX ADMIN — SENNOSIDES AND DOCUSATE SODIUM 1 TABLET: 50; 8.6 TABLET ORAL at 21:17

## 2022-01-24 RX ADMIN — METHOCARBAMOL 500 MG: 500 TABLET ORAL at 18:08

## 2022-01-24 RX ADMIN — OXYCODONE HYDROCHLORIDE 10 MG: 5 TABLET ORAL at 01:09

## 2022-01-24 RX ADMIN — ACETAMINOPHEN 975 MG: 325 TABLET, FILM COATED ORAL at 19:39

## 2022-01-24 RX ADMIN — OXYCODONE HYDROCHLORIDE 10 MG: 5 TABLET ORAL at 08:35

## 2022-01-24 RX ADMIN — HYDROXYZINE HYDROCHLORIDE 25 MG: 25 TABLET ORAL at 10:10

## 2022-01-24 RX ADMIN — PANTOPRAZOLE SODIUM 40 MG: 40 TABLET, DELAYED RELEASE ORAL at 18:08

## 2022-01-24 RX ADMIN — HYDROMORPHONE HYDROCHLORIDE 0.4 MG: 0.2 INJECTION, SOLUTION INTRAMUSCULAR; INTRAVENOUS; SUBCUTANEOUS at 11:25

## 2022-01-24 RX ADMIN — ACETAMINOPHEN 975 MG: 325 TABLET, FILM COATED ORAL at 11:28

## 2022-01-24 RX ADMIN — FOLIC ACID 1 MG: 1 TABLET ORAL at 08:35

## 2022-01-24 RX ADMIN — SENNOSIDES AND DOCUSATE SODIUM 1 TABLET: 50; 8.6 TABLET ORAL at 09:12

## 2022-01-24 RX ADMIN — INSULIN GLARGINE 10 UNITS: 100 INJECTION, SOLUTION SUBCUTANEOUS at 10:09

## 2022-01-24 RX ADMIN — CEFAZOLIN 1 G: 1 INJECTION, POWDER, FOR SOLUTION INTRAMUSCULAR; INTRAVENOUS at 08:35

## 2022-01-24 RX ADMIN — FUROSEMIDE 40 MG: 10 INJECTION, SOLUTION INTRAVENOUS at 11:28

## 2022-01-24 RX ADMIN — POLYETHYLENE GLYCOL 3350 17 G: 17 POWDER, FOR SOLUTION ORAL at 09:12

## 2022-01-24 RX ADMIN — KETOROLAC TROMETHAMINE 15 MG: 15 INJECTION, SOLUTION INTRAMUSCULAR; INTRAVENOUS at 21:17

## 2022-01-24 ASSESSMENT — ACTIVITIES OF DAILY LIVING (ADL)
ADLS_ACUITY_SCORE: 12
ADLS_ACUITY_SCORE: 14
ADLS_ACUITY_SCORE: 10
ADLS_ACUITY_SCORE: 12
ADLS_ACUITY_SCORE: 10
ADLS_ACUITY_SCORE: 14
ADLS_ACUITY_SCORE: 10
ADLS_ACUITY_SCORE: 10
ADLS_ACUITY_SCORE: 14
ADLS_ACUITY_SCORE: 12
ADLS_ACUITY_SCORE: 10
ADLS_ACUITY_SCORE: 10
ADLS_ACUITY_SCORE: 14
ADLS_ACUITY_SCORE: 10

## 2022-01-24 NOTE — CONSULTS
[facility]  Gastroenterology Consultation         Lamar Menendez  [address] [place] [address]  61 year old female    Admission Date/Time: 1/23/2022  Primary Care Provider: Chanel Glenhamkimmy Nazario  Referring / Attending Physician: Dr. Mildred Martinez    We were asked to see the patient in consultation by Dr. Mildred Martinez for evaluation of anemia/ h/o anastomotic ulcer.      CC: fall with a femur fracture    HPI:  Lamar Menendez is a 61 year old female who has a past medical history of hypertension, poorly controlled diabetes, prior history of gastric bypass with complication of esophagitis and anastomosis site ulcer, chronic iron deficiency anemia, alcohol abuse, depression and anxiety who presented with femur fracture after fall. On admission hemoglobin 7.6 and baseline 9-10. Last draw 8.5 after a transfusion    She does take daily PPI and Aleve. Last EGD in 2018 and had anastomotic ulcer. Has had no recurrent ulcer since. Denies melena, hematochezia, nausea, vomiting, epistaxis. Has had no visible blood loss. She denies heartburn, dysphagia or abdominal pain. Reports history of chronic anemia and receives routine iron transfusions.     Underwent open reduction and internal fixation of right femur neck on 1/23/22.  Currently undergoing cardiac echo for new LE swelling and history of alcohol abuse.  RUQ U/S done for h/o alcohol abuse- noted mild extrahepatic  Biliary dilation (8 mmm) and borderline ductal dilation ( 3 mm). Distended gallbladder without definite cholecystitis. Somewhat unusual focal thickening and/or calcification in the posterior gallbladder wall of uncertain etiology.    Labs note albumin 3.3, Total bilirubin 0.4, ALT 51, AST 91, Alkaline phosphatase 0.4. Platelets 265k, INR 1.01.    ROS: A comprehensive ten point review of systems was negative aside from those in mentioned in the HPI.      PAST MED HX:  I have reviewed this patient's medical history and updated it  with pertinent information if needed.   Past Medical History:   Diagnosis Date     Diabetes (H)      Heart disease      Hypertension      Sleep apnea        MEDICATIONS:   Prior to Admission Medications   Prescriptions Last Dose Informant Patient Reported? Taking?   DULoxetine (CYMBALTA) 60 MG capsule   Yes Yes   Sig: Take 60 mg by mouth daily   Vitamin D3 (CHOLECALCIFEROL) 125 MCG (5000 UT) tablet   Yes Yes   Sig: Take 125 mcg by mouth daily   furosemide (LASIX) 20 MG tablet   Yes Yes   Sig: Take 20 mg by mouth daily   hydrOXYzine (VISTARIL) 25 MG capsule   Yes Yes   Sig: Take 25 mg by mouth every 6 hours as needed for itching or anxiety   losartan (COZAAR) 25 MG tablet   Yes Yes   Sig: Take 25 mg by mouth daily   pantoprazole (PROTONIX) 40 MG EC tablet   Yes Yes   Sig: Take 40 mg by mouth daily   traZODone (DESYREL) 100 MG tablet   Yes Yes   Sig: Take 100 mg by mouth nightly as needed for sleep      Facility-Administered Medications: None       ALLERGIES:   Allergies   Allergen Reactions     Latex        SOCIAL HISTORY:  Social History     Tobacco Use     Smoking status: Never Smoker     Smokeless tobacco: Never Used   Substance Use Topics     Alcohol use: None     Drug use: None       FAMILY HISTORY:  History reviewed. No pertinent family history.    PHYSICAL EXAM:   General  Alert, oriented and comfortable  Vital Signs with Ranges  Temp: 98.5  F (36.9  C) Temp src: Oral BP: (!) 148/75 Pulse: 89   Resp: 16 SpO2: 100 % O2 Device: Nasal cannula Oxygen Delivery: 2 LPM  I/O last 3 completed shifts:  In: 1616 [I.V.:1000]  Out: 450 [Urine:400; Blood:50]    Constitutional: healthy, alert and no distress   Cardiovascular: negative, PMI normal. No lifts, heaves, or thrills. RRR. No murmurs, clicks gallops or rub  Respiratory: negative, Percussion normal. Good diaphragmatic excursion. Lungs clear  Abdomen: Abdomen soft, non-tender. BS normal. No masses, organomegaly          ADDITIONAL COMMENTS:   I reviewed the patient's  new clinical lab test results.   Recent Labs   Lab Test 01/23/22 2000 01/23/22  1233 01/23/22 0444 01/23/22  0051 01/23/22  0034   WBC  --   --   --   --  7.4   HGB 8.5* 7.7* 7.6*  --  7.9*   MCV  --   --   --   --  68*   PLT  --   --   --   --  265   INR  --   --   --  1.01  --      Recent Labs   Lab Test 01/23/22 0444 01/23/22  0034   POTASSIUM 3.6 3.6   CHLORIDE  --  101   CO2  --  26   BUN  --  11   ANIONGAP  --  8     Recent Labs   Lab Test 01/23/22 0034   ALBUMIN 3.3*   BILITOTAL 0.4   ALT 51*   AST 91*       I reviewed the patient's new imaging results.        CONSULTATION ASSESSMENT AND PLAN:    Lamar Menendez is a 61 year old female admitted on 1/23/22 for right hip fracture and has a past medical history of hypertension, poorly controlled diabetes, prior history of gastric bypass with complication of esophagitis and anastomosis site ulcer, chronic iron deficiency anemia, GI consulted for anemia on day of admission.    Active Problems:  Closed fracture of right hip, initial encounter (H)  Underwent open reduction and internal fixation of right femur neck on 1/23/22    Acute on chronic iron deficiency anemia  H/o GI bleed from anastomotic ulcer  S/p Kavon en Y with bleed in 2018 and on daily pantoprazole  Takes daily aleve for hip pain  No obvious signs of blood loss  Anemia may be due to fracture vs unlikely GI bleed  Hemoglobin currently stable in mid 8 range with baseline 9-10     -- No plans for EGD today  -- Will continue to monitor hemoglobin and if continues to drop will reconsider EGD tomorrow  -- Regular diet   -- NPO at midnight  -- Pantoprazole daily  -- PRN tums    H/o Alcohol abuse  Borderline biliary dilation with no abdominal pain  Elevated liver transaminases  LFTs minimally elevated and RUQ does note hepatic steatosis. Also consumes alcohol. Likely due to this.   INR and platelets unremarkable suggests no liver cirrhosis    -- recommend daily CMP and f/u of CMP  -- Repeat RUQ sono in 6  months or sooner with symptoms. EUS would be difficult given prior Kavon-en Y- could consider MRCP with ongoing LFT elevation  -- Alcohol cessation        [facility].  Office: [phone]  Cell : [phone] (Dr. Isbell)  Cell: [phone] (Ju Lawson PA-C)

## 2022-01-24 NOTE — PLAN OF CARE
POD 1 for R hip ORIF. A04 , VS stable. CMS intact. Dressing CDI on R hip, purewick in use, no BM since Saturday, last BS taken on shift was midnight was 216. Independent baseline but Assist x2 with rupali steady. Partial weight bearing. Reg diet. Oxycodone, dilaudid and atarax PRN given for pain.

## 2022-01-24 NOTE — PHARMACY-ADMISSION MEDICATION HISTORY
Pharmacy Medication History  Admission medication history interview status for the 1/23/2022  admission is complete. See EPIC admission navigator for prior to admission medications     Location of Interview: Phone  Medication history sources: Patient, Surescripts and Care Everywhere    Significant changes made to the medication list:  Confirmed she is no longer on Cymbalta, does not take APAP    In the past week, patient estimated taking medication this percent of the time: 50-90% due to other    Additional medication history information:       Medication reconciliation completed by provider prior to medication history? No    Time spent in this activity: 15 min    Prior to Admission medications    Medication Sig Last Dose Taking? Auth Provider   benzonatate (TESSALON) 100 MG capsule Take 100 mg by mouth 3 times daily as needed for cough Past Month at Unknown time Yes Unknown, Entered By History   buPROPion (WELLBUTRIN SR) 200 MG 12 hr tablet Take 200 mg by mouth 2 times daily 1/21/2022 Yes Unknown, Entered By History   dulaglutide (TRULICITY) 1.5 MG/0.5ML pen Inject 1.5 mg Subcutaneous every 7 days Mondays Past Week at Unknown time Yes Unknown, Entered By History   furosemide (LASIX) 20 MG tablet Take 20 mg by mouth daily 1/21/2022 at am Yes Unknown, Entered By History   hydrOXYzine (VISTARIL) 25 MG capsule Take 25 mg by mouth every 6 hours as needed for itching or anxiety Past Month at Unknown time Yes Unknown, Entered By History   losartan (COZAAR) 25 MG tablet Take 25 mg by mouth daily 1/21/2022 at am Yes Unknown, Entered By History   naproxen sodium (ANAPROX) 220 MG tablet Take 220 mg by mouth 2 times daily as needed for moderate pain Past Week at Unknown time Yes Unknown, Entered By History   pantoprazole (PROTONIX) 40 MG EC tablet Take 40 mg by mouth daily 1/21/2022 at am Yes Unknown, Entered By History   traZODone (DESYREL) 100 MG tablet Take 100 mg by mouth nightly as needed for sleep Past Month at Unknown  time Yes Unknown, Entered By History   vitamin B-12 (CYANOCOBALAMIN) 250 MCG tablet Take 250 mcg by mouth daily Past Week at Unknown time Yes Unknown, Entered By History   Vitamin D3 (CHOLECALCIFEROL) 125 MCG (5000 UT) tablet Take 125 mcg by mouth daily Past Week at Unknown time Yes Unknown, Entered By History       The information provided in this note is only as accurate as the sources available at the time of update(s)

## 2022-01-24 NOTE — PLAN OF CARE
OT: Orders received. Chart reviewed and discussed with care team.  Per PT, pt will require TCU at discharge and demonstrates poor tolerance of therapy 2x a day.  Defer discharge recommendations to PT.  Will complete orders.

## 2022-01-24 NOTE — PROGRESS NOTES
01/24/22 0930   Quick Adds   Type of Visit Initial PT Evaluation   Living Environment   People in home alone   Current Living Arrangements apartment   Home Accessibility no concerns   Living Environment Comments Pt has cane that she uses, tub shower with shower chair, grab bars by the shower. Had fall in the bathroom over rugs, runnning to bathroom d/t hx of incontinence   Self-Care   Usual Activity Tolerance moderate   Current Activity Tolerance poor   Regular Exercise No   Equipment Currently Used at Home cane, straight;grab bar, tub/shower;shower chair   Activity/Exercise/Self-Care Comment pt is independent with mobility and ADLs, takes public transit (bus) for driving.    Disability/Function   Fall history within last six months yes   Number of times patient has fallen within last six months 2   General Information   Onset of Illness/Injury or Date of Surgery 01/23/22   Referring Physician Prince Martin PA-C   Patient/Family Therapy Goals Statement (PT) return home, find out why her left leg hurts so bad (cramping on backside of leg while sitting EOB)    Pertinent History of Current Problem (include personal factors and/or comorbidities that impact the POC) 61 year old female who has a past medical history of hypertension, poorly controlled diabetes, prior history of gastric bypass with complication of esophagitis and anastomosis site ulcer, chronic iron deficiency anemia, alcohol abuse, depression and anxiety who presented with femur fracture after fall s/p R hip ORIF, having L leg pain and cramping as well.    Weight-Bearing Status - RLE partial weight-bearing (% in comments)  (50%)   Cognition   Orientation Status (Cognition) oriented x 4   Affect/Mental Status (Cognition) WFL   Follows Commands (Cognition) WFL   Behavioral Issues overwhelmed easily;difficulty managing stress   Cognitive Status Comments yelling out numerous times d/t high pain reports in both right and left legs with EOB sitting  and attempting to stand    Pain Assessment   Patient Currently in Pain Yes, see Vital Sign flowsheet  (10/10 pain in right hip and left posterior leg (cramping))   Bed Mobility   Comment (Bed Mobility) Max A x 1 for bed mobility supine <> sit    Transfers   Transfer Safety Comments Max A x 1 for partial stand with FWW    Gait/Stairs (Locomotion)   Comment (Gait/Stairs) unable to initate gait d/t pain and weakness    Sensory Examination   Sensory Perception patient reports no sensory changes   Clinical Impression   Criteria for Skilled Therapeutic Intervention yes, treatment indicated   PT Diagnosis (PT) impaired mobility    Influenced by the following impairments weakness, pain, impaired balance    Functional limitations due to impairments fall risk, decreased activity tolerance    Clinical Presentation Stable/Uncomplicated   Clinical Presentation Rationale clinical judgement    Clinical Decision Making (Complexity) low complexity   Therapy Frequency (PT) 6x/week   Predicted Duration of Therapy Intervention (days/wks) 7 days    Planned Therapy Interventions (PT) balance training;bed mobility training;gait training;neuromuscular re-education;strengthening;transfer training;patient/family education   Anticipated Equipment Needs at Discharge (PT) walker, rolling;wheelchair   Risk & Benefits of therapy have been explained evaluation/treatment results reviewed;risks/benefits reviewed;care plan/treatment goals reviewed;current/potential barriers reviewed;participants voiced agreement with care plan;participants included;patient   PT Discharge Planning    PT Discharge Recommendation (DC Rec) Transitional Care Facility   PT Rationale for DC Rec Pt is currently well below independent baseline, lives alone. Pt currently needing 2 people for safe mobility and unable to initiate gait 2/2 pain and weakness in both LEs d/t R hip fx s/p ORIF.    PT Brief overview of current status  A x 2, Max A for bed mob, SBA sitting with  intense 10/10 pain in both legs (pain in R hip, cramping in L leg)    Total Evaluation Time   Total Evaluation Time (Minutes) 15

## 2022-01-24 NOTE — ANESTHESIA POSTPROCEDURE EVALUATION
Patient: Lamar Menendez    Procedure: Procedure(s):  OPEN REDUCTION INTERNAL FIXATION RIGHT HIP       Diagnosis:Closed hip fracture (H) [S72.009A]  Diagnosis Additional Information: No value filed.    Anesthesia Type:  General    Note:  Disposition: Inpatient   Postop Pain Control: Uneventful            Sign Out: Well controlled pain   PONV: No   Neuro/Psych: Uneventful            Sign Out: Acceptable/Baseline neuro status   Airway/Respiratory: Uneventful            Sign Out: Acceptable/Baseline resp. status   CV/Hemodynamics: Uneventful            Sign Out: Acceptable CV status; No obvious hypovolemia; No obvious fluid overload   Other NRE: NONE   DID A NON-ROUTINE EVENT OCCUR?            Last vitals:  Vitals Value Taken Time   /82 01/23/22 1815   Temp 36.2  C (97.1  F) 01/23/22 1800   Pulse 88 01/23/22 1816   Resp 12 01/23/22 1816   SpO2 97 % 01/23/22 1817   Vitals shown include unvalidated device data.    Electronically Signed By: Sabina Villafana  January 23, 2022  7:57 PM

## 2022-01-24 NOTE — PROGRESS NOTES
Orthopedic Surgery  Lamar Menendez  2022  Admit Date:  2022  POD 1 Day Post-Op  S/P Procedure(s):  OPEN REDUCTION INTERNAL FIXATION RIGHT HIP    Patient resting comfortably in bed.    Pain poorly controlled at this time.  Tolerating oral intake.    Denies nausea or vomiting  Denies chest pain or shortness of breath  No events overnight.     Alert and orient to person, place, and time.  Vital Sign Ranges  Temperature Temp  Av.9  F (36.6  C)  Min: 97.1  F (36.2  C)  Max: 98.5  F (36.9  C)   Blood pressure Systolic (24hrs), Av , Min:131 , Max:174        Diastolic (24hrs), Av, Min:64, Max:90      Pulse Pulse  Av.7  Min: 82  Max: 96   Respirations Resp  Av.2  Min: 8  Max: 18   Pulse oximetry SpO2  Av.1 %  Min: 91 %  Max: 100 %       Dressing is clean, dry, and intact.   Minimal erythema of the surrounding skin.   Bilateral calves are soft, non-tender.  Bilateral lower extremity is NVI.  Sensation intact bilateral lower extremities  5/5 motor with resisted dorsi and plantar flexion bilaterally  +Dp pulse     Left LE pain from buttock to mid calf  No ecchymosis or erythema over entirety of the left leg  No notable swelling or edema  Full ROM of left knee - 0 to 120 degrees  Stable to varus and valgus stress  No TTP over medial or lateral joint line  Hip flexes to 100 degrees  Internal rotation 30 degrees, External rotation 15 degrees  No pain with internal/external rotation while in flexion  No TTP over great trochanter  Bilateral calves are soft, non-tender.  Bilateral lower extremity is NVI.  Sensation intact bilateral lower extremities  5/5 motor with resisted dorsi and plantar flexion bilaterally  5/5 hip flexors  4/5 quad with pain  5/5 EHL  +Dp pulse      Labs:  Recent Labs   Lab Test 22  0845 22  0444 22  0034   POTASSIUM 3.5 3.6 3.6     Recent Labs   Lab Test 22  0845 22  2000 22  1233   HGB 8.4* 8.5* 7.7*     Recent Labs   Lab Test  01/23/22  0051   INR 1.01     Recent Labs   Lab Test 01/23/22  0034          A/P  1. S/p right fem neck fracture with DHS   Continue ASA for DVT prophylaxis.     Mobilize with PT/OT    50 % WB right LE   Leave dressing intact.     Continue current pain regiment. Added Robaxin and 2x doses of Toradol   XR of lumbar spine ordered    2. Disposition   Anticipate d/c to TCU based on pain control, progress with PT and medical clearance.    Carolyne Pierce PA-C

## 2022-01-24 NOTE — PLAN OF CARE
POD 0 for R hip ORIF, , Alert, Orientation waxes and wanes, forgetful, BP slightly elevated on 2 L has not been OOB, purewick in use, DTV, continue to monitor.   Never smoker

## 2022-01-24 NOTE — PROGRESS NOTES
[facility]    Medicine Progress Note - Hospitalist Service    Date of Admission:  1/23/2022    Assessment & Plan        Lamar Menendez is a pleasant 61-year-old female with a past medical history of hypertension, diabetes mellitus type 2, history of alcohol abuse/dependence gastric bypass surgery with history of esophagitis and anastomotic ulcer, history of iron deficiency anemia, depression/anxiety, posttraumatic stress disorder, bacteremia, pneumonia, bilateral pleural effusion, who was brought in for evaluation of right hip pain, status post a fall.    Acute mildly impacted basicervical fracture of the right femur s/p ORIF 01/23  Two mechanical falls  Two falls prior to admission. First fall involved slipping on a rug and resulted in initial ER visit outside hospital which was negative for a fracture. Returned home and placed weight on R leg and it gave out resulting in a second fall. Came to [facility] were imaging now showed femur fracture    > Post operative management per ortho including weight bearing status and DVT ppx  > therapies to evaluate today   > falls were both mechanical without any presyncope symptoms    Acute on chronic iron deficiency anemia, possible blood loss  Hx of esophagitis and anastomotic ulcer  Hx of gastric bypass  PTA taking protonix daily however also taking NSAIDs daily and daily etoh use  - Hb on admission 7.9 and she received IV iron at outside hospital just prior to admission here  - no hx of melena by report  - s/p transfusion yesterday by surgery    > GI consulted and recommends to continue to monitor Hb, no plans for EGD at this time unless Hb drops  > continue PPI BID and stop NSAIDs    DM2  -uncontrolled, A1C 10.4 on admission and BG >400    >Hold PTA trulicity and start insulin, follow insulin needs    Alcohol abuse  Elevated LFTS  She has long history of alcohol dependence.  She states that she drank heavily since 2005 through 2009, and then she had  been in a residential treatment and she had been sober for 12 years.  Unfortunately, she started drinking again a few months ago.  She reports drinking 2-3 alcoholic beverages daily.  She is not interested in alcohol detoxification treatment at this time.    - RUQ obtained with fatty liver but no overt cirrhosis, distended gallbladder with thickening. Mild CBD dilation.    > continue CIWA and to follow LFTs  > No RUQ pain. GI recommend follow up RUQ US as outpatient, unable to perform EUS with her abnormal anatomy from her gastric bypass    New HFpEF, not in acute exacerbation  Echo resulted with normal EF at 50-55% mild LVH, grade 1 diastolic dysfunction and increased PA pressure at 45mmhg    - will give a dose of IV lasix and follow her edema  - recommend RANI study as outpatient    Essential HTN  Restart losartan         Diet: Regular Diet Adult    DVT Prophylaxis: PCDs for now  Stanford Catheter: Not present  Central Lines: None  Cardiac Monitoring: None  Code Status: No CPR- Do NOT Intubate      Disposition Plan   Expected Discharge: 01/25/2022     Anticipated discharge location: home with help/services    Delays:     Other (Add Comment)            The patient's care was discussed with the patient    Leti Michelle DO  Hospitalist Service  [facility]  Securely message with the Vocera Web Console (learn more here)  Text page via [employer] Paging/Directory         Clinically Significant Risk Factors Present on Admission             # Diabetes, type II: last A1C 10.4 % (Ref range: 0.0 - 5.6 %)  # Obesity: last Body mass index is 36.71 kg/m .      ______________________________________________________________________    Interval History   Patient denies major pain. Breathing is stable. No cough. No GI complaints. Edema in legs remains R leg is more swollen near her surgery    Data reviewed today: I reviewed all medications, new labs and imaging results over the last 24 hours. I personally  reviewed echo results.    Physical Exam   Vital Signs: Temp: 98.5  F (36.9  C) Temp src: Oral BP: (!) 148/75 Pulse: 89   Resp: 16 SpO2: 100 % O2 Device: Nasal cannula Oxygen Delivery: 2 LPM  Weight: 213 lbs 13.54 oz     Constitutional: Awake, alert, cooperative, no apparent distress  Respiratory: Clear to auscultation bilaterally, no crackles or wheezing  Cardiovascular: Regular rate and rhythm, normal S1 and S2, and no murmur noted  GI: Normal bowel sounds, soft, non-distended, non-tender  Skin/Integumen: No rashes, no cyanosis, +1 LE edema  MSK: R hip with lateral incision, clean moderate swelling throughout    Data   Recent Labs   Lab 01/24/22  0908 01/24/22  0845 01/24/22  0032 01/23/22  2034 01/23/22 2000 01/23/22  1433 01/23/22  1233 01/23/22  0757 01/23/22  0444 01/23/22  0433 01/23/22  0051 01/23/22  0034   WBC  --   --   --   --   --   --   --   --   --   --   --  7.4   HGB  --  8.4*  --   --  8.5*  --  7.7*  --  7.6*  --   --  7.9*   MCV  --   --   --   --   --   --   --   --   --   --   --  68*   PLT  --   --   --   --   --   --   --   --   --   --   --  265   INR  --   --   --   --   --   --   --   --   --   --  1.01  --    NA  --  135  --   --   --   --   --   --   --   --   --  135   POTASSIUM  --  3.5  --   --   --   --   --   --  3.6  --   --  3.6   CHLORIDE  --  103  --   --   --   --   --   --   --   --   --  101   CO2  --  26  --   --   --   --   --   --   --   --   --  26   BUN  --  16  --   --   --   --   --   --   --   --   --  11   CR  --  1.00  --   --   --   --   --   --   --   --   --  0.78   ANIONGAP  --  6  --   --   --   --   --   --   --   --   --  8   GABRIEL  --  8.1*  --   --   --   --   --   --   --   --   --  8.2*   * 229* 216*   < >  --    < >  --    < >  --    < >  --  411*   ALBUMIN  --  3.0*  --   --   --   --   --   --   --   --   --  3.3*   PROTTOTAL  --  7.0  --   --   --   --   --   --   --   --   --  7.0   BILITOTAL  --  0.6  --   --   --   --   --   --   --   --   --   0.4   ALKPHOS  --  121  --   --   --   --   --   --   --   --   --  115   ALT  --  38  --   --   --   --   --   --   --   --   --  51*   AST  --  55*  --   --   --   --   --   --   --   --   --  91*    < > = values in this interval not displayed.     Recent Results (from the past 24 hour(s))   US Abdomen Limited    Narrative    EXAM: US ABDOMEN LIMITED  LOCATION: [facility]  DATE/TIME: 1/23/2022 11:25 AM    INDICATION: Elevated LFTs.  COMPARISON: None.  TECHNIQUE: Limited abdominal ultrasound.    FINDINGS:    GALLBLADDER: Distended gallbladder. Echogenic wall thickening and/or calcification noted which is nonspecific. No definite shadowing stones.    BILE DUCTS: Mild extrahepatic biliary dilatation. The common duct measures 8 mm.    LIVER: Increased echogenicity from diffuse fatty infiltration. No focal mass.    RIGHT KIDNEY: No hydronephrosis.    PANCREAS: Borderline prominent pancreatic duct at 3 mm.    No ascites.      Impression    IMPRESSION:  1.  Mild extrahepatic bile duct dilatation and borderline pancreatic ductal dilatation of uncertain etiology.    2.  Distended gallbladder without definite cholecystitis. Somewhat unusual focal thickening and/or calcification in the posterior gallbladder wall of uncertain etiology.    3.  Extensive fatty infiltration of the liver.       XR Surgery ARASELI Fluoro L/T 5 Min w Stills    Narrative    EXAM: XR SURGERY ARASELI FLUORO LESS THAN 5 MIN W STILLS  LOCATION: [facility]  DATE/TIME: 1/23/2022 3:00 PM    INDICATION: Intraoperative fluoroscopy taken during an orthopedic procedure.  COMPARISON: 1/23/2022.  TECHNIQUE: Exam performed by surgeon.    FINDINGS:    FLUOROSCOPIC TIME: 1.3  NUMBER OF IMAGES: 6.      Impression    IMPRESSION: Multiple fluoroscopic spot films of the hip taken during an ORIF of a femoral neck fracture with lateral plate and femoral neck screw fixation. Negative for intraoperative purposes. Please see the  operative note for further details.   Echocardiogram Complete   Result Value    LVEF  50-55%    Narrative    [id number]  [id number]  [id number]  2011^JEAN-PIERRE^CHRISTINA^AMELIA     [facility]  Echocardiography Laboratory  [address] [place] [id number]     Name: RENE RODRÍGUEZ  MRN: [id number]  : 1960  Study Date: 2022 08:34 AM  Age: 61 yrs  Gender: Female  Patient Location: [facility]  Reason For Study: Heart Failure  Ordering Physician: CHRISTINA MOREJON  Performed By: Lucita Carrera     BSA: 2.0 m2  Height: 64 in  Weight: 213 lb  HR: 90  BP: 168/86 mmHg  ______________________________________________________________________________  Procedure  Complete Portable Echo Adult. Optison (NDC #[id number]) given intravenously.  ______________________________________________________________________________  Interpretation Summary     1. Left ventricular systolic function is low normal. The visual ejection  fraction is 50-55%.  2. No regional wall motion abnormalities noted.  3. The right ventricle is normal in structure, function and size.  4. There is mild (1+) mitral regurgitation.  5. Mild to moderate pulmonary hypertension; The right ventricular systolic  pressure is approximated at 45mmHg plus the right atrial pressure. IVC  diameter <2.1 cm collapsing >50% with sniff suggests a normal RA pressure of 3  mmHg.  ______________________________________________________________________________  Left Ventricle  The left ventricle is normal in size. There is mild concentric left  ventricular hypertrophy. The visual ejection fraction is 50-55%. Left  ventricular systolic function is low normal. Grade I or early diastolic  dysfunction. No regional wall motion abnormalities noted.     Right Ventricle  The right ventricle is normal in structure, function and size.     Atria  The left atrium is mildly dilated. Right atrial size is normal. There is no  color Doppler evidence of an atrial shunt.     Mitral  Valve  There is mild mitral annular calcification. There is mild (1+) mitral  regurgitation.     Tricuspid Valve  The tricuspid valve is normal in structure and function. There is mild (1+)  tricuspid regurgitation. The right ventricular systolic pressure is  approximated at 45mmHg plus the right atrial pressure.     Aortic Valve  The aortic valve is normal in structure and function.     Pulmonic Valve  The pulmonic valve is normal in structure and function. There is trace  pulmonic valvular regurgitation.     Vessels  Normal size aorta. IVC diameter <2.1 cm collapsing >50% with sniff suggests a  normal RA pressure of 3 mmHg.     Pericardium  There is no pericardial effusion.     Rhythm  Sinus rhythm was noted.  ______________________________________________________________________________  MMode/2D Measurements & Calculations     IVSd: 1.4 cm  LVIDd: 4.8 cm  LVIDs: 3.7 cm  LVPWd: 1.2 cm  FS: 24.0 %  LV mass(C)d: 251.0 grams  LV mass(C)dI: 124.9 grams/m2  Ao root diam: 3.3 cm  LA dimension: 4.4 cm  asc Aorta Diam: 3.5 cm  LA/Ao: 1.3  LA Volume (BP): 71.6 ml  LA Volume Index (BP): 35.6 ml/m2  RWT: 0.52     TAPSE: 2.6 cm     Doppler Measurements & Calculations  MV E max michelle: 76.7 cm/sec  MV A max michelle: 89.8 cm/sec  MV E/A: 0.85  MV dec time: 0.17 sec  TR max michelle: 334.2 cm/sec  TR max P.7 mmHg  E/E' av.3  Lateral E/e': 10.1  Medial E/e': 12.5     ______________________________________________________________________________  Report approved by: Betsy Bingham 2022 09:29 AM           Medications       acetaminophen  975 mg Oral Q8H     aspirin  325 mg Oral Daily     buPROPion  200 mg Oral BID     folic acid  1 mg Oral Daily     insulin aspart  1-7 Units Subcutaneous TID AC     insulin aspart  1-5 Units Subcutaneous At Bedtime     insulin glargine  10 Units Subcutaneous QAM AC     [START ON 2022] losartan  25 mg Oral Daily     multivitamin w/minerals  1 tablet Oral Daily     pantoprazole  (PROTONIX) IV  40 mg Intravenous BID     polyethylene glycol  17 g Oral Daily     senna-docusate  1 tablet Oral BID    Or     senna-docusate  2 tablet Oral BID     sodium chloride (PF)  3 mL Intracatheter Q8H     sodium chloride (PF)  3 mL Intracatheter Q8H     thiamine  100 mg Oral Daily

## 2022-01-25 ENCOUNTER — APPOINTMENT (OUTPATIENT)
Dept: PHYSICAL THERAPY | Facility: CLINIC | Age: 62
DRG: 481 | End: 2022-01-25
Payer: COMMERCIAL

## 2022-01-25 ENCOUNTER — APPOINTMENT (OUTPATIENT)
Dept: GENERAL RADIOLOGY | Facility: CLINIC | Age: 62
DRG: 481 | End: 2022-01-25
Attending: PHYSICIAN ASSISTANT
Payer: COMMERCIAL

## 2022-01-25 LAB
ALBUMIN SERPL-MCNC: 2.7 G/DL (ref 3.4–5)
ALP SERPL-CCNC: 144 U/L (ref 40–150)
ALT SERPL W P-5'-P-CCNC: 46 U/L (ref 0–50)
ANION GAP SERPL CALCULATED.3IONS-SCNC: 4 MMOL/L (ref 3–14)
AST SERPL W P-5'-P-CCNC: 87 U/L (ref 0–45)
BILIRUB DIRECT SERPL-MCNC: 0.2 MG/DL (ref 0–0.2)
BILIRUB SERPL-MCNC: 0.7 MG/DL (ref 0.2–1.3)
BUN SERPL-MCNC: 18 MG/DL (ref 7–30)
CALCIUM SERPL-MCNC: 8.2 MG/DL (ref 8.5–10.1)
CHLORIDE BLD-SCNC: 99 MMOL/L (ref 94–109)
CO2 SERPL-SCNC: 29 MMOL/L (ref 20–32)
CREAT SERPL-MCNC: 0.95 MG/DL (ref 0.52–1.04)
GFR SERPL CREATININE-BSD FRML MDRD: 67 ML/MIN/1.73M2
GLUCOSE BLD-MCNC: 293 MG/DL (ref 70–99)
GLUCOSE BLDC GLUCOMTR-MCNC: 157 MG/DL (ref 70–99)
GLUCOSE BLDC GLUCOMTR-MCNC: 194 MG/DL (ref 70–99)
GLUCOSE BLDC GLUCOMTR-MCNC: 251 MG/DL (ref 70–99)
GLUCOSE BLDC GLUCOMTR-MCNC: 258 MG/DL (ref 70–99)
GLUCOSE BLDC GLUCOMTR-MCNC: 271 MG/DL (ref 70–99)
HGB BLD-MCNC: 8.4 G/DL (ref 11.7–15.7)
MAGNESIUM SERPL-MCNC: 2.3 MG/DL (ref 1.6–2.3)
PHOSPHATE SERPL-MCNC: 2.6 MG/DL (ref 2.5–4.5)
POTASSIUM BLD-SCNC: 3.6 MMOL/L (ref 3.4–5.3)
PROT SERPL-MCNC: 6.8 G/DL (ref 6.8–8.8)
SODIUM SERPL-SCNC: 132 MMOL/L (ref 133–144)

## 2022-01-25 PROCEDURE — 99232 SBSQ HOSP IP/OBS MODERATE 35: CPT | Performed by: STUDENT IN AN ORGANIZED HEALTH CARE EDUCATION/TRAINING PROGRAM

## 2022-01-25 PROCEDURE — 120N000001 HC R&B MED SURG/OB

## 2022-01-25 PROCEDURE — 80053 COMPREHEN METABOLIC PANEL: CPT | Performed by: STUDENT IN AN ORGANIZED HEALTH CARE EDUCATION/TRAINING PROGRAM

## 2022-01-25 PROCEDURE — 85018 HEMOGLOBIN: CPT | Performed by: PHYSICIAN ASSISTANT

## 2022-01-25 PROCEDURE — 84100 ASSAY OF PHOSPHORUS: CPT | Performed by: INTERNAL MEDICINE

## 2022-01-25 PROCEDURE — 250N000013 HC RX MED GY IP 250 OP 250 PS 637: Performed by: PHYSICIAN ASSISTANT

## 2022-01-25 PROCEDURE — 250N000013 HC RX MED GY IP 250 OP 250 PS 637: Performed by: INTERNAL MEDICINE

## 2022-01-25 PROCEDURE — 73560 X-RAY EXAM OF KNEE 1 OR 2: CPT | Mod: RT

## 2022-01-25 PROCEDURE — 97530 THERAPEUTIC ACTIVITIES: CPT | Mod: GP

## 2022-01-25 PROCEDURE — 250N000013 HC RX MED GY IP 250 OP 250 PS 637: Performed by: STUDENT IN AN ORGANIZED HEALTH CARE EDUCATION/TRAINING PROGRAM

## 2022-01-25 PROCEDURE — 250N000011 HC RX IP 250 OP 636: Performed by: STUDENT IN AN ORGANIZED HEALTH CARE EDUCATION/TRAINING PROGRAM

## 2022-01-25 PROCEDURE — 83735 ASSAY OF MAGNESIUM: CPT | Performed by: INTERNAL MEDICINE

## 2022-01-25 PROCEDURE — 73552 X-RAY EXAM OF FEMUR 2/>: CPT | Mod: RT

## 2022-01-25 PROCEDURE — 36415 COLL VENOUS BLD VENIPUNCTURE: CPT | Performed by: PHYSICIAN ASSISTANT

## 2022-01-25 PROCEDURE — 82248 BILIRUBIN DIRECT: CPT | Performed by: STUDENT IN AN ORGANIZED HEALTH CARE EDUCATION/TRAINING PROGRAM

## 2022-01-25 RX ORDER — FUROSEMIDE 10 MG/ML
40 INJECTION INTRAMUSCULAR; INTRAVENOUS ONCE
Status: COMPLETED | OUTPATIENT
Start: 2022-01-25 | End: 2022-01-25

## 2022-01-25 RX ORDER — POLYETHYLENE GLYCOL 3350 17 G/17G
17 POWDER, FOR SOLUTION ORAL DAILY PRN
DISCHARGE
Start: 2022-01-25

## 2022-01-25 RX ORDER — FUROSEMIDE 40 MG
40 TABLET ORAL DAILY
Status: DISCONTINUED | OUTPATIENT
Start: 2022-01-26 | End: 2022-02-02 | Stop reason: HOSPADM

## 2022-01-25 RX ORDER — ASPIRIN 325 MG
325 TABLET, DELAYED RELEASE (ENTERIC COATED) ORAL DAILY
Qty: 30 TABLET | Refills: 0 | Status: ON HOLD | DISCHARGE
Start: 2022-01-26 | End: 2022-02-25

## 2022-01-25 RX ORDER — HYDROXYZINE HYDROCHLORIDE 25 MG/1
25 TABLET, FILM COATED ORAL EVERY 6 HOURS PRN
DISCHARGE
Start: 2022-01-25

## 2022-01-25 RX ORDER — OXYCODONE HYDROCHLORIDE 10 MG/1
5-10 TABLET ORAL EVERY 4 HOURS PRN
Qty: 25 TABLET | Refills: 0 | Status: ON HOLD | OUTPATIENT
Start: 2022-01-25 | End: 2022-02-25

## 2022-01-25 RX ORDER — METHOCARBAMOL 500 MG/1
500 TABLET, FILM COATED ORAL 4 TIMES DAILY PRN
DISCHARGE
Start: 2022-01-25 | End: 2022-02-21

## 2022-01-25 RX ORDER — ACETAMINOPHEN 325 MG/1
975 TABLET ORAL EVERY 8 HOURS PRN
DISCHARGE
Start: 2022-01-25

## 2022-01-25 RX ADMIN — OXYCODONE HYDROCHLORIDE 10 MG: 5 TABLET ORAL at 04:24

## 2022-01-25 RX ADMIN — PANTOPRAZOLE SODIUM 40 MG: 40 TABLET, DELAYED RELEASE ORAL at 06:30

## 2022-01-25 RX ADMIN — OXYCODONE HYDROCHLORIDE 10 MG: 5 TABLET ORAL at 13:09

## 2022-01-25 RX ADMIN — INSULIN GLARGINE 10 UNITS: 100 INJECTION, SOLUTION SUBCUTANEOUS at 08:43

## 2022-01-25 RX ADMIN — OXYCODONE HYDROCHLORIDE 10 MG: 5 TABLET ORAL at 08:37

## 2022-01-25 RX ADMIN — ACETAMINOPHEN 975 MG: 325 TABLET, FILM COATED ORAL at 20:50

## 2022-01-25 RX ADMIN — POLYETHYLENE GLYCOL 3350 17 G: 17 POWDER, FOR SOLUTION ORAL at 08:38

## 2022-01-25 RX ADMIN — OXYCODONE HYDROCHLORIDE 10 MG: 5 TABLET ORAL at 16:50

## 2022-01-25 RX ADMIN — HYDROXYZINE HYDROCHLORIDE 25 MG: 25 TABLET ORAL at 00:26

## 2022-01-25 RX ADMIN — SENNOSIDES AND DOCUSATE SODIUM 2 TABLET: 50; 8.6 TABLET ORAL at 20:50

## 2022-01-25 RX ADMIN — OXYCODONE HYDROCHLORIDE 10 MG: 5 TABLET ORAL at 00:26

## 2022-01-25 RX ADMIN — METHOCARBAMOL 500 MG: 500 TABLET ORAL at 11:16

## 2022-01-25 RX ADMIN — FUROSEMIDE 40 MG: 10 INJECTION, SOLUTION INTRAVENOUS at 13:08

## 2022-01-25 RX ADMIN — METHOCARBAMOL 500 MG: 500 TABLET ORAL at 06:30

## 2022-01-25 RX ADMIN — BUPROPION HYDROCHLORIDE 200 MG: 200 TABLET, EXTENDED RELEASE ORAL at 08:37

## 2022-01-25 RX ADMIN — ACETAMINOPHEN 975 MG: 325 TABLET, FILM COATED ORAL at 04:24

## 2022-01-25 RX ADMIN — ASPIRIN 325 MG: 325 TABLET, DELAYED RELEASE ORAL at 08:37

## 2022-01-25 RX ADMIN — OXYCODONE HYDROCHLORIDE 10 MG: 5 TABLET ORAL at 21:03

## 2022-01-25 RX ADMIN — METHOCARBAMOL 500 MG: 500 TABLET ORAL at 18:40

## 2022-01-25 RX ADMIN — FOLIC ACID 1 MG: 1 TABLET ORAL at 08:37

## 2022-01-25 RX ADMIN — PANTOPRAZOLE SODIUM 40 MG: 40 TABLET, DELAYED RELEASE ORAL at 16:50

## 2022-01-25 RX ADMIN — HYDROXYZINE HYDROCHLORIDE 25 MG: 25 TABLET ORAL at 06:30

## 2022-01-25 RX ADMIN — ACETAMINOPHEN 975 MG: 325 TABLET, FILM COATED ORAL at 11:40

## 2022-01-25 RX ADMIN — LOSARTAN POTASSIUM 25 MG: 25 TABLET, FILM COATED ORAL at 08:37

## 2022-01-25 RX ADMIN — HYDROXYZINE HYDROCHLORIDE 25 MG: 25 TABLET ORAL at 17:00

## 2022-01-25 RX ADMIN — MULTIPLE VITAMINS W/ MINERALS TAB 1 TABLET: TAB at 08:37

## 2022-01-25 RX ADMIN — SENNOSIDES AND DOCUSATE SODIUM 2 TABLET: 50; 8.6 TABLET ORAL at 08:37

## 2022-01-25 RX ADMIN — THIAMINE HCL TAB 100 MG 100 MG: 100 TAB at 08:37

## 2022-01-25 ASSESSMENT — ACTIVITIES OF DAILY LIVING (ADL)
ADLS_ACUITY_SCORE: 12
ADLS_ACUITY_SCORE: 8
ADLS_ACUITY_SCORE: 12
ADLS_ACUITY_SCORE: 8
ADLS_ACUITY_SCORE: 12
ADLS_ACUITY_SCORE: 8
ADLS_ACUITY_SCORE: 12
ADLS_ACUITY_SCORE: 8
ADLS_ACUITY_SCORE: 12
ADLS_ACUITY_SCORE: 8
ADLS_ACUITY_SCORE: 12
ADLS_ACUITY_SCORE: 8
ADLS_ACUITY_SCORE: 8
ADLS_ACUITY_SCORE: 12
ADLS_ACUITY_SCORE: 8
ADLS_ACUITY_SCORE: 8

## 2022-01-25 NOTE — PLAN OF CARE
Patient vital signs are at baseline: Yes  Patient able to ambulate as they were prior to admission or with assist devices provided by therapies during their stay:  No,  Reason:  Not OOB today,just dangled.  Patient MUST void prior to discharge:  Yes,purewick ,good output.  Patient able to tolerate oral intake:  Yes  Pain has adequate pain control using Oral analgesics:  Yes.   A/OX4, cms intact. IV dilaudid X1,Oxycodone, Robaxin,atarax and schedule toradol given for pain with good pain control.  Diabetic,insulin converged dine with meals. Bilateral abdomen fold has abrasions, intra dry in place.Dreg CDI.

## 2022-01-25 NOTE — PROGRESS NOTES
Hemoglobin stable in mid 8  Range. No plant for endoscopic intervention.    GI will sign off.    Ju Lawson PA-C  [facility]  [phone]

## 2022-01-25 NOTE — PROGRESS NOTES
[facility]    Medicine Progress Note - Hospitalist Service    Date of Admission:  1/23/2022    Assessment & Plan        Lamar Menendez is a pleasant 61-year-old female with a past medical history of hypertension, diabetes mellitus type 2, history of alcohol abuse/dependence gastric bypass surgery with history of esophagitis and anastomotic ulcer, history of iron deficiency anemia, depression/anxiety, posttraumatic stress disorder, bacteremia, pneumonia, bilateral pleural effusion, who was brought in for evaluation of right hip pain, status post a fall.    Acute mildly impacted basicervical fracture of the right femur s/p ORIF 01/23  Two mechanical falls  Two falls prior to admission. First fall involved slipping on a rug and resulted in initial ER visit at outside hospital which was negative for a fracture. Returned home and placed weight on R leg and it gave out resulting in a second fall. Came to [facility] were imaging now showed femur fracture    > Post operative management per ortho including weight bearing status and DVT ppx  > therapies recommending TCU  > falls were both mechanical without any presyncope symptoms  > Ortho has ordered more imaging for some mild persistent pain, follow results    Acute on chronic iron deficiency anemia, possible blood loss  Hx of esophagitis and anastomotic ulcer  Hx of gastric bypass  PTA taking protonix daily however also taking NSAIDs daily and daily etoh use  - Hb on admission 7.9 and she received IV iron at outside hospital just prior to admission here  - no hx of melena by report  - s/p transfusion on admission by surgery    > Hb stable today at 8.4  > GI consulted and now signed off  > continue PPI BID and stop NSAIDs  > can follow up for outpatient GI evaluation as below    DM2  -uncontrolled, A1C 10.4 on admission and BG >400    > Hold PTA trulicity  > added glargine 5 units and increase sliding scale today for -290    Alcohol  abuse  Elevated LFTS  Liver steatosis  She has long history of alcohol dependence.  She states that she drank heavily since 2005 through 2009, and then she had been in a residential treatment and she had been sober for 12 years.  Unfortunately, she started drinking again a few months ago.  She reports drinking 2-3 alcoholic beverages daily.  She is not interested in alcohol detoxification treatment at this time.    - RUQ obtained with fatty liver but no overt cirrhosis, distended gallbladder with thickening. Mild CBD dilation.    > continue CIWA and to follow LFTs  > No RUQ pain. GI recommend follow up RUQ US as outpatient, unable to perform EUS with her abnormal anatomy from her gastric bypass    New HFpEF, not in acute exacerbation  Echo resulted with normal EF at 50-55% mild LVH, grade 1 diastolic dysfunction and increased PA pressure at 45mmhg    - edema in LE improved today, give another dose of IV lasix and then start oral 40mg tomorrow. PTA patient on 20mg of lasix  - recommend RANI study as outpatient for elevated PA pressure    Essential HTN  Restart losartan         Diet: Regular Diet Adult  Diet    DVT Prophylaxis: PCDs and asa per ortho  Stanford Catheter: Not present  Central Lines: None  Cardiac Monitoring: None  Code Status: No CPR- Do NOT Intubate      Disposition Plan   Expected Discharge: 01/26/2022     Anticipated discharge location: TCU and SW consulted    Delays:     Other (Add Comment)            The patient's care was discussed with the patient    Leti Michelle DO  Hospitalist Service  [facility]  Securely message with the Vocera Web Console (learn more here)  Text page via [employer] Paging/Directory         Clinically Significant Risk Factors Present on Admission             # Diabetes, type II: last A1C 9.4 % (Ref range: 0.0 - 5.6 %)  # Obesity: last Body mass index is 36.71 kg/m .      ______________________________________________________________________    Interval  History   Patient doing well today. Pain in her groin is better and likely muscle strain. No GI complaints. Breathing is stable. No cough. R hip pain is better.    Data reviewed today: I reviewed all medications, new labs and imaging results over the last 24 hours.     Physical Exam   Vital Signs: Temp: 97.9  F (36.6  C) Temp src: Oral BP: 130/69 Pulse: 85   Resp: 16 SpO2: 96 % O2 Device: None (Room air)    Weight: 213 lbs 13.54 oz     Constitutional: Awake, alert, cooperative, no apparent distress  Respiratory: Clear to auscultation bilaterally, no crackles or wheezing  Cardiovascular: Regular rate and rhythm, normal S1 and S2, and no murmur noted  GI: Normal bowel sounds, soft, non-distended, non-tender  Skin/Integumen: No rashes, no cyanosis, trace LE edema  MSK: R hip with lateral incision, clean moderate swelling throughout    Data   Recent Labs   Lab 01/25/22  1140 01/25/22  0924 01/25/22  0846 01/24/22  0908 01/24/22  0845 01/23/22  2034 01/23/22 2000 01/23/22  0757 01/23/22  0444 01/23/22  0433 01/23/22  0051 01/23/22  0034   WBC  --   --   --   --   --   --   --   --   --   --   --  7.4   HGB  --  8.4*  --   --  8.4*  --  8.5*   < > 7.6*  --   --  7.9*   MCV  --   --   --   --   --   --   --   --   --   --   --  68*   PLT  --   --   --   --   --   --   --   --   --   --   --  265   INR  --   --   --   --   --   --   --   --   --   --  1.01  --    NA  --  132*  --   --  135  --   --   --   --   --   --  135   POTASSIUM  --  3.6  --   --  3.5  --   --   --  3.6  --   --  3.6   CHLORIDE  --  99  --   --  103  --   --   --   --   --   --  101   CO2  --  29  --   --  26  --   --   --   --   --   --  26   BUN  --  18  --   --  16  --   --   --   --   --   --  11   CR  --  0.95  --   --  1.00  --   --   --   --   --   --  0.78   ANIONGAP  --  4  --   --  6  --   --   --   --   --   --  8   GABRIEL  --  8.2*  --   --  8.1*  --   --   --   --   --   --  8.2*   * 293* 251*   < > 229*   < >  --    < >  --    < >   --  411*   ALBUMIN  --  2.7*  --   --  3.0*  --   --   --   --   --   --  3.3*   PROTTOTAL  --  6.8  --   --  7.0  --   --   --   --   --   --  7.0   BILITOTAL  --  0.7  --   --  0.6  --   --   --   --   --   --  0.4   ALKPHOS  --  144  --   --  121  --   --   --   --   --   --  115   ALT  --  46  --   --  38  --   --   --   --   --   --  51*   AST  --  87*  --   --  55*  --   --   --   --   --   --  91*    < > = values in this interval not displayed.     Recent Results (from the past 24 hour(s))   XR Lumbar Spine 2/3 Views    Narrative    EXAM: XR LUMBAR SPINE 2-3 VIEWS  LOCATION: [facility]  DATE/TIME: 1/24/2022 9:49 PM    INDICATION: Left lower extremity radiculopathy.  COMPARISON: None.  TECHNIQUE: CR Lumbar Spine.      Impression    IMPRESSION: Decreased osseous mineralization degrades evaluation. Subtle fractures cannot be excluded. The L4 and L5 vertebral body heights cannot be reliably assessed on the sagittal images. There is an age-indeterminate superior endplate compression   deformity noted involving the presumed L1 vertebral body with approximately 20% height loss. The L2 and L3 vertebral body heights are maintained. Alignment appears maintained. Multilevel degenerative changes are present. No definite acute extraspinal   abnormality. The patient is status post right-sided total hip arthroplasty. There is mild levoconvex curvature of the lumbar spine.     Medications       acetaminophen  975 mg Oral Q8H     aspirin  325 mg Oral Daily     buPROPion  200 mg Oral BID     folic acid  1 mg Oral Daily     insulin aspart  1-7 Units Subcutaneous TID AC     insulin aspart  1-5 Units Subcutaneous At Bedtime     insulin glargine  10 Units Subcutaneous QAM AC     losartan  25 mg Oral Daily     multivitamin w/minerals  1 tablet Oral Daily     pantoprazole  40 mg Oral BID AC     polyethylene glycol  17 g Oral Daily     senna-docusate  1 tablet Oral BID    Or     senna-docusate  2 tablet Oral  BID     thiamine  100 mg Oral Daily

## 2022-01-25 NOTE — PROGRESS NOTES
Orthopedic Surgery  Lamar Menendez  2022  Admit Date:  2022  POD 2 Days Post-Op  S/P Procedure(s):  OPEN REDUCTION INTERNAL FIXATION RIGHT HIP    Patient resting comfortably in bed.    Pain controlled. States right mid thigh has pain (previous TKA and ORIF femur fracture)  Tolerating oral intake.    Denies nausea or vomiting  Denies chest pain or shortness of breath  No events overnight.     Alert and orient to person, place, and time.  Vital Sign Ranges  Temperature Temp  Av.4  F (36.9  C)  Min: 97.9  F (36.6  C)  Max: 98.6  F (37  C)   Blood pressure Systolic (24hrs), Av , Min:129 , Max:130        Diastolic (24hrs), Av, Min:59, Max:69      Pulse Pulse  Av.3  Min: 84  Max: 90   Respirations Resp  Av  Min: 16  Max: 16   Pulse oximetry SpO2  Av %  Min: 92 %  Max: 97 %       Dressing is clean, dry, and intact. Mild central strike through, stable  Minimal erythema of the surrounding skin.   Bilateral calves are soft, non-tender.  Bilateral lower extremity is NVI.  Right mid thigh TTP  No TTP to right knee   Sensation intact bilateral lower extremities  5/5 motor with resisted dorsi and plantar flexion bilaterally  +Dp pulse    Left LE with cramping and pain with motion   Full strength and ROM  Labs:  Recent Labs   Lab Test 22  0924 22  0845 22  0444   POTASSIUM 3.6 3.5 3.6     Recent Labs   Lab Test 22  0924 22  0845 22  2000   HGB 8.4* 8.4* 8.5*     Recent Labs   Lab Test 22  0051   INR 1.01     Recent Labs   Lab Test 22  0034          A/P  1. Right DHS femur, femoral fracture   Continue ASA for DVT prophylaxis.     Mobilize with PT/OT    50% WB right LE   Leave dressing intact.     Continue current pain regiment.   MRI lumbar spine   XR right knee, and right femur     2. Disposition   Anticipate d/c to TCU based on progress with PT; medical clearance.    Carolyne Pierce PA-C

## 2022-01-25 NOTE — CONSULTS
Care Management Initial Consult    General Information  Assessment completed with: Patient,    Type of CM/SW Visit: Initial Assessment    Primary Care Provider verified and updated as needed: Yes   Readmission within the last 30 days: no previous admission in last 30 days      Reason for Consult: discharge planning  Advance Care Planning: Advance Care Planning Reviewed: other (comment) (No acp docs)          Communication Assessment  Patient's communication style: spoken language (English or Bilingual)    Hearing Difficulty or Deaf: no   Wear Glasses or Blind: yes    Cognitive  Cognitive/Neuro/Behavioral: WDL  Level of Consciousness: alert  Arousal Level: opens eyes spontaneously  Orientation: oriented x 4  Mood/Behavior: calm,cooperative,behavior appropriate to situation  Best Language: 0 - No aphasia  Speech: clear,spontaneous,logical    Living Environment:   People in home: alone     Current living Arrangements: house      Able to return to prior arrangements: yes       Family/Social Support:  Care provided by: self  Provides care for: no one  Marital Status: Single  Children,Sibling(s)          Description of Support System: Supportive,Involved    Support Assessment: Adequate family and caregiver support,Adequate social supports    Current Resources:   Patient receiving home care services: No     Community Resources: None  Equipment currently used at home: none  Supplies currently used at home: None    Employment/Financial:  Employment Status: retired        Financial Concerns:             Lifestyle & Psychosocial Needs:  Social Determinants of Health     Tobacco Use: Low Risk      Smoking Tobacco Use: Never Smoker     Smokeless Tobacco Use: Never Used   Alcohol Use: Not on file   Financial Resource Strain: Not on file   Food Insecurity: Not on file   Transportation Needs: Not on file   Physical Activity: Not on file   Stress: Not on file   Social Connections: Not on file   Intimate Partner Violence: Not on file    Depression: Not on file   Housing Stability: Not on file       Functional Status:  Prior to admission patient needed assistance:              Mental Health Status:          Chemical Dependency Status:                Values/Beliefs:  Spiritual, Cultural Beliefs, Tenriism Practices, Values that affect care: no               Additional Information:  Per care management/social work consult for discharge planning, patient admitted on 1/23/2022 with closed fracture of right hip. Tentative date of discharge is 1/26/2022. Reviewed chart and saw that PT is recommending TCU for patient. Talked to patient and she said that she lives by herself in a house independently. Patient asked for referrals to be sent to [facility] and other facilities near. Patient will need a ride set up at discharge.    Sent referrals via DOD to [facility], [facility], [facility],  [facility], and [facility].    Will continue to follow.    CAROL Snowden

## 2022-01-25 NOTE — PROGRESS NOTES
Patient vital signs are at baseline: Yes  Patient able to ambulate as they were prior to admission or with assist devices provided by therapies during their stay:  No,  Reason:  In bed all shift, pain with movement T/R  Patient MUST void prior to discharge:  Yes  Patient able to tolerate oral intake:  Yes  Pain has adequate pain control using Oral analgesics:  Yes   Alert and oriented x 4. VSS. CMS intact. Dressing to R hip, with dried drainage, marked. In best most of the shift. C/o pain with turning during cares. Pain managed with PRN Oxycodone, Robaxin, atarax and scheduled Tylenol. Voiding adequately, purewick on.

## 2022-01-25 NOTE — PROGRESS NOTES
Care Transitions Team: Following for CC, discharge planning, and disposition.       Per [facility] Trauma Liaison Handoff:   Writer spoke with Lamar via phone introduced myself and explained my role in her plan of care. Discussed therapy recommendations for TCU and she is agreeable. Writer provide Medicare star ratings. Lamar first preference is  [facility] - okay with double room, would like to keep the cost down. Writer would appreciate assistance from care coordination to send referrals to the facilties listed below and follow-up on bed availability and transport.     Living situation: Lives alone - apartment   Increase service or equipment needs: walker & wheelchair     Prior Function level:   Ambulation Cane    ADL's Independent     Current home care services: None    Family/patient discharge goal: Return to baseline level of function.   Barriers to Discharge: Medically stable.   Discharge Plan of care: TCU - PT/OT   TCU - Medicare compare TCU list provided - CM discussed Medicare compare website and star ratings.   1.  [facility]  2.  [facility]   3. [facility]  4. [facility]       Orders needed for services: Post Op Plan of Care - TCU stay - PT/OT to evaluate and treat.     Transportation:  to follow-up regarding transport, she may have family provide depending on the time. Writer did provide education on the typical cost of transportation.        Will follow in collaboration with O CM  Shanika Broderick -[phone] [facility] for discharge planning.

## 2022-01-26 ENCOUNTER — APPOINTMENT (OUTPATIENT)
Dept: ULTRASOUND IMAGING | Facility: CLINIC | Age: 62
DRG: 481 | End: 2022-01-26
Attending: INTERNAL MEDICINE
Payer: COMMERCIAL

## 2022-01-26 ENCOUNTER — APPOINTMENT (OUTPATIENT)
Dept: MRI IMAGING | Facility: CLINIC | Age: 62
DRG: 481 | End: 2022-01-26
Attending: PHYSICIAN ASSISTANT
Payer: COMMERCIAL

## 2022-01-26 ENCOUNTER — APPOINTMENT (OUTPATIENT)
Dept: PHYSICAL THERAPY | Facility: CLINIC | Age: 62
DRG: 481 | End: 2022-01-26
Payer: COMMERCIAL

## 2022-01-26 LAB
GLUCOSE BLDC GLUCOMTR-MCNC: 181 MG/DL (ref 70–99)
GLUCOSE BLDC GLUCOMTR-MCNC: 200 MG/DL (ref 70–99)
GLUCOSE BLDC GLUCOMTR-MCNC: 233 MG/DL (ref 70–99)
GLUCOSE BLDC GLUCOMTR-MCNC: 234 MG/DL (ref 70–99)
GLUCOSE BLDC GLUCOMTR-MCNC: 240 MG/DL (ref 70–99)

## 2022-01-26 PROCEDURE — 250N000013 HC RX MED GY IP 250 OP 250 PS 637: Performed by: INTERNAL MEDICINE

## 2022-01-26 PROCEDURE — 93971 EXTREMITY STUDY: CPT | Mod: LT

## 2022-01-26 PROCEDURE — 250N000013 HC RX MED GY IP 250 OP 250 PS 637: Performed by: PHYSICIAN ASSISTANT

## 2022-01-26 PROCEDURE — 99232 SBSQ HOSP IP/OBS MODERATE 35: CPT | Performed by: INTERNAL MEDICINE

## 2022-01-26 PROCEDURE — 72148 MRI LUMBAR SPINE W/O DYE: CPT

## 2022-01-26 PROCEDURE — 120N000001 HC R&B MED SURG/OB

## 2022-01-26 PROCEDURE — 97530 THERAPEUTIC ACTIVITIES: CPT | Mod: GP

## 2022-01-26 PROCEDURE — 250N000011 HC RX IP 250 OP 636: Performed by: PHYSICIAN ASSISTANT

## 2022-01-26 PROCEDURE — 250N000013 HC RX MED GY IP 250 OP 250 PS 637: Performed by: STUDENT IN AN ORGANIZED HEALTH CARE EDUCATION/TRAINING PROGRAM

## 2022-01-26 RX ADMIN — PANTOPRAZOLE SODIUM 40 MG: 40 TABLET, DELAYED RELEASE ORAL at 16:02

## 2022-01-26 RX ADMIN — SENNOSIDES AND DOCUSATE SODIUM 1 TABLET: 50; 8.6 TABLET ORAL at 08:28

## 2022-01-26 RX ADMIN — HYDROXYZINE HYDROCHLORIDE 25 MG: 25 TABLET ORAL at 02:25

## 2022-01-26 RX ADMIN — OXYCODONE HYDROCHLORIDE 10 MG: 5 TABLET ORAL at 02:25

## 2022-01-26 RX ADMIN — ASPIRIN 325 MG: 325 TABLET, DELAYED RELEASE ORAL at 08:28

## 2022-01-26 RX ADMIN — ACETAMINOPHEN 975 MG: 325 TABLET, FILM COATED ORAL at 11:03

## 2022-01-26 RX ADMIN — HYDROXYZINE HYDROCHLORIDE 25 MG: 25 TABLET ORAL at 11:03

## 2022-01-26 RX ADMIN — OXYCODONE HYDROCHLORIDE 10 MG: 5 TABLET ORAL at 21:34

## 2022-01-26 RX ADMIN — THIAMINE HCL TAB 100 MG 100 MG: 100 TAB at 08:27

## 2022-01-26 RX ADMIN — OXYCODONE HYDROCHLORIDE 10 MG: 5 TABLET ORAL at 08:28

## 2022-01-26 RX ADMIN — BUPROPION HYDROCHLORIDE 200 MG: 200 TABLET, EXTENDED RELEASE ORAL at 08:27

## 2022-01-26 RX ADMIN — MULTIPLE VITAMINS W/ MINERALS TAB 1 TABLET: TAB at 08:27

## 2022-01-26 RX ADMIN — FUROSEMIDE 40 MG: 40 TABLET ORAL at 08:27

## 2022-01-26 RX ADMIN — OXYCODONE HYDROCHLORIDE 10 MG: 5 TABLET ORAL at 12:27

## 2022-01-26 RX ADMIN — HYDROXYZINE HYDROCHLORIDE 25 MG: 25 TABLET ORAL at 23:02

## 2022-01-26 RX ADMIN — POLYETHYLENE GLYCOL 3350 17 G: 17 POWDER, FOR SOLUTION ORAL at 08:27

## 2022-01-26 RX ADMIN — ACETAMINOPHEN 975 MG: 325 TABLET, FILM COATED ORAL at 04:32

## 2022-01-26 RX ADMIN — METHOCARBAMOL 500 MG: 500 TABLET ORAL at 05:34

## 2022-01-26 RX ADMIN — OXYCODONE HYDROCHLORIDE 10 MG: 5 TABLET ORAL at 17:35

## 2022-01-26 RX ADMIN — SENNOSIDES AND DOCUSATE SODIUM 1 TABLET: 50; 8.6 TABLET ORAL at 21:35

## 2022-01-26 RX ADMIN — LOSARTAN POTASSIUM 25 MG: 25 TABLET, FILM COATED ORAL at 08:27

## 2022-01-26 RX ADMIN — HYDROXYZINE HYDROCHLORIDE 25 MG: 25 TABLET ORAL at 16:02

## 2022-01-26 RX ADMIN — HYDROMORPHONE HYDROCHLORIDE 0.2 MG: 0.2 INJECTION, SOLUTION INTRAMUSCULAR; INTRAVENOUS; SUBCUTANEOUS at 13:35

## 2022-01-26 RX ADMIN — FOLIC ACID 1 MG: 1 TABLET ORAL at 08:28

## 2022-01-26 RX ADMIN — HYDROMORPHONE HYDROCHLORIDE 0.4 MG: 0.2 INJECTION, SOLUTION INTRAMUSCULAR; INTRAVENOUS; SUBCUTANEOUS at 19:11

## 2022-01-26 RX ADMIN — PANTOPRAZOLE SODIUM 40 MG: 40 TABLET, DELAYED RELEASE ORAL at 06:53

## 2022-01-26 ASSESSMENT — ACTIVITIES OF DAILY LIVING (ADL)
ADLS_ACUITY_SCORE: 12
ADLS_ACUITY_SCORE: 12
ADLS_ACUITY_SCORE: 8
ADLS_ACUITY_SCORE: 12
ADLS_ACUITY_SCORE: 12
ADLS_ACUITY_SCORE: 10
ADLS_ACUITY_SCORE: 12
ADLS_ACUITY_SCORE: 12
ADLS_ACUITY_SCORE: 8
ADLS_ACUITY_SCORE: 12
ADLS_ACUITY_SCORE: 8
ADLS_ACUITY_SCORE: 12
ADLS_ACUITY_SCORE: 12
ADLS_ACUITY_SCORE: 8
ADLS_ACUITY_SCORE: 10
ADLS_ACUITY_SCORE: 8
ADLS_ACUITY_SCORE: 12
ADLS_ACUITY_SCORE: 8

## 2022-01-26 NOTE — PLAN OF CARE
A&Ox4. VSS on RA. Denies chest pain and SOB. Aquacel dressing changed by ortho PA. Partial WB to RLE. Pain managed with prn Oxycodone and iv Dilaudid. Atraxa for muscle spasm. C/o tenderness and pain around left calf. US of LLE completed, pending result. Tolerating reg diet. Using purewick. Up to BSC x2. Up with ceiling lift due to increased pain and cramps with movement. MRI check list completed and faxed. Plan is MR lumbar spine tonight.

## 2022-01-26 NOTE — PROGRESS NOTES
[facility]    Medicine Progress Note - Hospitalist Service    Date of Admission:  1/23/2022    Assessment & Plan          Lamar Menendez is a pleasant 61-year-old female with hypertension, diabetes mellitus type 2, alcohol use disorder, previous gastric bypass surgery, previous esophagitis and anastomotic ulcer, iron deficiency anemia, depression/anxiety, PTSD who was brought in due to right femoral neck fracture secondary to mechanical fall at home.  She had 2 falls at home, first she slipped on a rug and was seen at outside hospital.  Work-up was negative for fracture.  When she returned home, her right leg gave out with weightbearing resulting in second fall.  She was then brought to [facility] and found to have right femur basicervical fracture.    #.  Acute, mildly impacted, basicervical fracture of right femur secondary to mechanical fall, s/p open reduction and internal fixation using hip screw, 1/23/2022  -Has not been able to bear weight or stand up yet.  Needs a lift.  -Management as per orthopedics.  -Weightbearing status, DVT prophylaxis, PT/OT as per orthopedics  -Will need TCU    #.  Left calf left posterior thigh pain-  -Patient reports left calf and left posterior thigh pain since her fall.  There is tenderness on squeezing the calf.  No fractures or deformity.  -Pain may be secondary to sprain.  Will need to rule out DVT of left lower extremity.  Obtained ultrasound.    #.  Gradually worsening chronic iron deficiency anemia  -Presented with hemoglobin of 7.9.  Received 1 unit PRBC perioperatively.  Received IV iron at outside hospital prior to transfer  -Hemoglobin 8.4 and stable  -Records reviewed.  Hemoglobin was 11.1 a year ago and since then has gradually been trending down, was 9.7 on 1/13/2022.  This is most likely secondary to iron deficiency given her history of gastric bypass surgery.  -Recent labs showed ferritin of 13, normal vitamin B12    -Assessed by GI-does not appear to have any GI bleed.  EGD recommended    #.  Previous esophagitis and anastomotic ulcer  -EGD 2018 showed anastomotic ulcer  -On PPI, continue Protonix.  -Was taking NSAIDs PTA, also daily alcohol use  -Avoid NSAIDs    #.  S/p Kavon-en-Y gastric bypass-has been noncompliant with supplements    #.  Diabetes mellitus type 2, without long-term use of insulin, uncontrolled with most recent hemoglobin A1c 10.4, with hyperglycemia on admission, blood sugar over 400  -PT Trulicity on hold  -On Lantus 15 units daily and high-dose sliding scale  -Blood sugars running over 200  -If no improvement in blood sugar, will increase insulin dosing tomorrow    #.  Alcohol use disorder   -Longstanding history of alcohol use.  Used to drink heavily until 2009, then quit for 12 years after inpatient treatment.  -Relapsed few months ago and now drinks 2-3 drinks per day.  She is not interested in quitting alcohol  -Monitor for alcohol withdrawal, [facility] protocol.  Monitor labs    #.  Alcoholic hepatitis -has elevated AST and ultrasound with fatty infiltration    #.  Distended gallbladder on ultrasound, without evidence of acute cholecystitis-  -No right upper quadrant pain or tenderness but has distended gallbladder with mild thickening and mildly dilated CBD.  -GI has recommended outpatient right upper quadrant ultrasound.  Unable to perform EUS due to altered anatomy from previous gastric bypass surgery    #.  New diagnosis of diastolic CHF exacerbation with preserved EF of 55-55%   -No acute exacerbation   -Echo also showed LVH, grade 1 diastolic dysfunction, increased PA pressure 45 mmHg   -Outpatient sleep study recommended for suspected RANI   -Continue Lasix 20 mg daily which is her PTA medicine     #.  Essential hypertension-controlled on losartan  -continue losartan    #.  Vitamin D deficiency-recent vitamin D low at 8    #.  Hyperlipidemia-recent lipid panel showed , HDL 84, triglycerides  106       Diet: Regular Diet Adult  Diet    DVT Prophylaxis: Aspirin, SCDs  Stanford Catheter: Not present  Central Lines: None  Cardiac Monitoring: None  Code Status: No CPR- Do NOT Intubate      Disposition Plan   Expected Discharge: 01/26/2022     Anticipated discharge location: [facility] facility    Delays:     Other (Add Comment)            The patient's care was discussed with the Patient and Patient's Family.    Glenna Serna MD  Hospitalist Service  [facility]  Securely message with the Vocera Web Console (learn more here)  Text page via [employer] Paging/Directory         Clinically Significant Risk Factors Present on Admission             # Diabetes, type II: last A1C 9.4 % (Ref range: 0.0 - 5.6 %)  # Obesity: last Body mass index is 36.71 kg/m .      ______________________________________________________________________    Interval History    Patient reports she is not doing well.  She is unable to bear weight on her left leg.  Complains of pain in left posterior thigh and left calf.  Pain in surgical area, right lower extremity is better controlled  Denies dizziness, chest pain or shortness of breath  No nausea or vomiting   no abdominal pain      Data reviewed today: I reviewed all medications, new labs and imaging results over the last 24 hours. I personally reviewed no images or EKG's today.    Physical Exam   Vital Signs: Temp: 98  F (36.7  C) Temp src: Oral BP: (!) 150/72 Pulse: 76   Resp: 16 SpO2: 92 % O2 Device: None (Room air)    Weight: 213 lbs 13.54 oz    Constitutional: Awake, alert, cooperative, no apparent distress  Respiratory: Clear to auscultation bilaterally, no crackles or wheezing  Cardiovascular: Regular rate and rhythm, normal S1 and S2, and no murmur noted  GI: Normal bowel sounds, soft, non-distended, non-tender  Skin/Integumen: No rashes, no cyanosis, trace LE edema  MSK: R hip with lateral incision, clean moderate swelling throughout, tenderness in left  leg with calf squeezing    Data   Recent Labs   Lab 01/26/22  1212 01/26/22  0818 01/26/22  0226 01/25/22  1140 01/25/22  0924 01/24/22  0908 01/24/22  0845 01/23/22 2034 01/23/22 2000 01/23/22  0757 01/23/22  0444 01/23/22  0433 01/23/22  0051 01/23/22  0034   WBC  --   --   --   --   --   --   --   --   --   --   --   --   --  7.4   HGB  --   --   --   --  8.4*  --  8.4*  --  8.5*   < > 7.6*  --   --  7.9*   MCV  --   --   --   --   --   --   --   --   --   --   --   --   --  68*   PLT  --   --   --   --   --   --   --   --   --   --   --   --   --  265   INR  --   --   --   --   --   --   --   --   --   --   --   --  1.01  --    NA  --   --   --   --  132*  --  135  --   --   --   --   --   --  135   POTASSIUM  --   --   --   --  3.6  --  3.5  --   --   --  3.6  --   --  3.6   CHLORIDE  --   --   --   --  99  --  103  --   --   --   --   --   --  101   CO2  --   --   --   --  29  --  26  --   --   --   --   --   --  26   BUN  --   --   --   --  18  --  16  --   --   --   --   --   --  11   CR  --   --   --   --  0.95  --  1.00  --   --   --   --   --   --  0.78   ANIONGAP  --   --   --   --  4  --  6  --   --   --   --   --   --  8   GABRIEL  --   --   --   --  8.2*  --  8.1*  --   --   --   --   --   --  8.2*   * 200* 234*   < > 293*   < > 229*   < >  --    < >  --    < >  --  411*   ALBUMIN  --   --   --   --  2.7*  --  3.0*  --   --   --   --   --   --  3.3*   PROTTOTAL  --   --   --   --  6.8  --  7.0  --   --   --   --   --   --  7.0   BILITOTAL  --   --   --   --  0.7  --  0.6  --   --   --   --   --   --  0.4   ALKPHOS  --   --   --   --  144  --  121  --   --   --   --   --   --  115   ALT  --   --   --   --  46  --  38  --   --   --   --   --   --  51*   AST  --   --   --   --  87*  --  55*  --   --   --   --   --   --  91*    < > = values in this interval not displayed.     Medications       aspirin  325 mg Oral Daily     buPROPion  200 mg Oral BID     folic acid  1 mg Oral Daily     furosemide  40  mg Oral Daily     insulin aspart  1-10 Units Subcutaneous TID AC     insulin aspart  1-7 Units Subcutaneous At Bedtime     insulin glargine  15 Units Subcutaneous QAM AC     losartan  25 mg Oral Daily     multivitamin w/minerals  1 tablet Oral Daily     pantoprazole  40 mg Oral BID AC     polyethylene glycol  17 g Oral Daily     senna-docusate  1 tablet Oral BID    Or     senna-docusate  2 tablet Oral BID     thiamine  100 mg Oral Daily

## 2022-01-26 NOTE — PLAN OF CARE
Pt A&O x4. Up with , partial weight bearing right leg. Purewick in place. Not OOB this shift. CIWA score 0.CMS intact. IV saline lock. Carb count diet. Small drainage, dressing marked. Pain managed with PRN oxycodone, PRN atarax, PRN robaxin and scheduled tylenol. Continue to monitor.

## 2022-01-26 NOTE — PROGRESS NOTES
Orthopedic Surgery  Lamar Menendez  2022  Admit Date:  2022  POD: 3 Days Post-Op   Procedure(s):  OPEN REDUCTION INTERNAL FIXATION RIGHT HIP    Alert and oriented.   Patient resting comfortably in bed.    Pain moderately controlled.  Bilateral LE pain.   US left LE ordered by Glenna Serna MD  Tolerating oral intake.    Denies nausea or vomiting    Vital Sign Ranges  Temperature Temp  Av.9  F (36.6  C)  Min: 97.6  F (36.4  C)  Max: 98.1  F (36.7  C)   Blood pressure Systolic (24hrs), Av , Min:146 , Max:150        Diastolic (24hrs), Av, Min:72, Max:79      Pulse Pulse  Av.7  Min: 76  Max: 92   Respirations Resp  Av  Min: 16  Max: 16   Pulse oximetry SpO2  Av.7 %  Min: 92 %  Max: 95 %       Dressing is 80% saturated.  Changed at bedside.  Incision well approximated and staples intact.  No erythema.    Minimal erythema of the surrounding skin.   Bilateral calves are soft, mild TTP   Bilateral lower extremities are NVI.  Sensation intact bilateral lower extremities  Patient able to resist dorsi and plantar flexion bilaterally  +Dp pulse    Labs:  Recent Labs   Lab Test 22  0034   WBC 7.4     Recent Labs   Lab Test 22  0924 22  0845 22  2000   HGB 8.4* 8.4* 8.5*     Recent Labs   Lab Test 22  0051   INR 1.01     Recent Labs   Lab Test 22  0034         A/P  1. Right DHS femur, femoral fracture              Continue ASA for DVT prophylaxis.                Mobilize with PT/OT               50% WB right LE              Changed at bedside.  Leave dressing intact, change if peeling or 60% saturated.                Continue current pain regiment.              MRI lumbar spine: pending               XR right knee, and right femur: unremarkable      2. Disposition              Anticipate d/c to TCU based on progress with PT; medical clearance.       Linn Cali PA-C

## 2022-01-26 NOTE — PLAN OF CARE
Alert and oriented x4. VSS. Pain mostly rated 8-9/10. Only got out of bed for therapy, otherwise did not want to get out of bed because of pain. Purewhick in place. CIWA 0,0,0. Blood glucose 251, 194. CMS intact. Dressing with dried drainage. Plan to continue to monitor tonight. Pt needs a lot of encouragement to get moving.

## 2022-01-27 LAB
GLUCOSE BLDC GLUCOMTR-MCNC: 137 MG/DL (ref 70–99)
GLUCOSE BLDC GLUCOMTR-MCNC: 143 MG/DL (ref 70–99)
GLUCOSE BLDC GLUCOMTR-MCNC: 206 MG/DL (ref 70–99)
GLUCOSE BLDC GLUCOMTR-MCNC: 212 MG/DL (ref 70–99)
GLUCOSE BLDC GLUCOMTR-MCNC: 232 MG/DL (ref 70–99)
GLUCOSE BLDC GLUCOMTR-MCNC: 237 MG/DL (ref 70–99)
GLUCOSE BLDC GLUCOMTR-MCNC: 241 MG/DL (ref 70–99)

## 2022-01-27 PROCEDURE — 250N000013 HC RX MED GY IP 250 OP 250 PS 637: Performed by: PHYSICIAN ASSISTANT

## 2022-01-27 PROCEDURE — 250N000011 HC RX IP 250 OP 636: Performed by: PHYSICIAN ASSISTANT

## 2022-01-27 PROCEDURE — 99232 SBSQ HOSP IP/OBS MODERATE 35: CPT | Performed by: INTERNAL MEDICINE

## 2022-01-27 PROCEDURE — 250N000013 HC RX MED GY IP 250 OP 250 PS 637: Performed by: INTERNAL MEDICINE

## 2022-01-27 PROCEDURE — 120N000001 HC R&B MED SURG/OB

## 2022-01-27 PROCEDURE — 250N000013 HC RX MED GY IP 250 OP 250 PS 637: Performed by: STUDENT IN AN ORGANIZED HEALTH CARE EDUCATION/TRAINING PROGRAM

## 2022-01-27 RX ORDER — METOPROLOL SUCCINATE 25 MG/1
25 TABLET, EXTENDED RELEASE ORAL DAILY
Status: DISCONTINUED | OUTPATIENT
Start: 2022-01-27 | End: 2022-02-02 | Stop reason: HOSPADM

## 2022-01-27 RX ORDER — ERGOCALCIFEROL 1.25 MG/1
50000 CAPSULE, LIQUID FILLED ORAL
Status: DISCONTINUED | OUTPATIENT
Start: 2022-01-27 | End: 2022-02-02 | Stop reason: HOSPADM

## 2022-01-27 RX ORDER — LOSARTAN POTASSIUM 50 MG/1
50 TABLET ORAL DAILY
Status: DISCONTINUED | OUTPATIENT
Start: 2022-01-28 | End: 2022-02-02 | Stop reason: HOSPADM

## 2022-01-27 RX ADMIN — PANTOPRAZOLE SODIUM 40 MG: 40 TABLET, DELAYED RELEASE ORAL at 06:36

## 2022-01-27 RX ADMIN — METOPROLOL SUCCINATE 25 MG: 25 TABLET, EXTENDED RELEASE ORAL at 13:53

## 2022-01-27 RX ADMIN — OXYCODONE HYDROCHLORIDE 10 MG: 5 TABLET ORAL at 12:06

## 2022-01-27 RX ADMIN — PANTOPRAZOLE SODIUM 40 MG: 40 TABLET, DELAYED RELEASE ORAL at 16:16

## 2022-01-27 RX ADMIN — FUROSEMIDE 40 MG: 40 TABLET ORAL at 09:09

## 2022-01-27 RX ADMIN — OXYCODONE HYDROCHLORIDE 10 MG: 5 TABLET ORAL at 20:33

## 2022-01-27 RX ADMIN — CALCIUM CARBONATE (ANTACID) CHEW TAB 500 MG 500 MG: 500 CHEW TAB at 16:32

## 2022-01-27 RX ADMIN — SENNOSIDES AND DOCUSATE SODIUM 1 TABLET: 50; 8.6 TABLET ORAL at 09:09

## 2022-01-27 RX ADMIN — METHOCARBAMOL 500 MG: 500 TABLET ORAL at 07:54

## 2022-01-27 RX ADMIN — THIAMINE HCL TAB 100 MG 100 MG: 100 TAB at 09:09

## 2022-01-27 RX ADMIN — HYDROMORPHONE HYDROCHLORIDE 0.4 MG: 0.2 INJECTION, SOLUTION INTRAMUSCULAR; INTRAVENOUS; SUBCUTANEOUS at 17:46

## 2022-01-27 RX ADMIN — OXYCODONE HYDROCHLORIDE 10 MG: 5 TABLET ORAL at 16:32

## 2022-01-27 RX ADMIN — MELATONIN 5 MG TABLET 5 MG: at 00:20

## 2022-01-27 RX ADMIN — OXYCODONE HYDROCHLORIDE 10 MG: 5 TABLET ORAL at 06:36

## 2022-01-27 RX ADMIN — SENNOSIDES AND DOCUSATE SODIUM 1 TABLET: 50; 8.6 TABLET ORAL at 20:33

## 2022-01-27 RX ADMIN — ACETAMINOPHEN 650 MG: 325 TABLET, FILM COATED ORAL at 00:20

## 2022-01-27 RX ADMIN — METHOCARBAMOL 500 MG: 500 TABLET ORAL at 13:53

## 2022-01-27 RX ADMIN — HYDROXYZINE HYDROCHLORIDE 25 MG: 25 TABLET ORAL at 06:37

## 2022-01-27 RX ADMIN — ASPIRIN 325 MG: 325 TABLET, DELAYED RELEASE ORAL at 09:09

## 2022-01-27 RX ADMIN — HYDROMORPHONE HYDROCHLORIDE 0.4 MG: 0.2 INJECTION, SOLUTION INTRAMUSCULAR; INTRAVENOUS; SUBCUTANEOUS at 11:23

## 2022-01-27 RX ADMIN — METHOCARBAMOL 500 MG: 500 TABLET ORAL at 17:46

## 2022-01-27 RX ADMIN — HYDROMORPHONE HYDROCHLORIDE 0.4 MG: 0.2 INJECTION, SOLUTION INTRAMUSCULAR; INTRAVENOUS; SUBCUTANEOUS at 22:33

## 2022-01-27 RX ADMIN — HYDROXYZINE HYDROCHLORIDE 25 MG: 25 TABLET ORAL at 12:06

## 2022-01-27 RX ADMIN — BUPROPION HYDROCHLORIDE 200 MG: 200 TABLET, EXTENDED RELEASE ORAL at 16:16

## 2022-01-27 RX ADMIN — ERGOCALCIFEROL 50000 UNITS: 1.25 CAPSULE, LIQUID FILLED ORAL at 17:46

## 2022-01-27 RX ADMIN — FOLIC ACID 1 MG: 1 TABLET ORAL at 09:09

## 2022-01-27 RX ADMIN — LOSARTAN POTASSIUM 25 MG: 25 TABLET, FILM COATED ORAL at 09:09

## 2022-01-27 RX ADMIN — POLYETHYLENE GLYCOL 3350 17 G: 17 POWDER, FOR SOLUTION ORAL at 09:09

## 2022-01-27 RX ADMIN — BUPROPION HYDROCHLORIDE 200 MG: 200 TABLET, EXTENDED RELEASE ORAL at 09:09

## 2022-01-27 ASSESSMENT — ACTIVITIES OF DAILY LIVING (ADL)
ADLS_ACUITY_SCORE: 12
ADLS_ACUITY_SCORE: 10
ADLS_ACUITY_SCORE: 12
ADLS_ACUITY_SCORE: 10
ADLS_ACUITY_SCORE: 12

## 2022-01-27 NOTE — PROGRESS NOTES
[facility]    Medicine Progress Note - Hospitalist Service    Date of Admission:  1/23/2022    Assessment & Plan          Lamar Menendez is a pleasant 61-year-old female with hypertension, diabetes mellitus type 2, alcohol use disorder, previous gastric bypass surgery, previous esophagitis and anastomotic ulcer, iron deficiency anemia, depression/anxiety, PTSD who was brought in due to right femoral neck fracture secondary to mechanical fall at home.  She had 2 falls at home, first she slipped on a rug and was seen at outside hospital.  Work-up was negative for fracture.  When she returned home, her right leg gave out with weightbearing resulting in second fall.  She was then brought to [facility] and found to have right femur basicervical fracture.    #.  Acute, mildly impacted, basicervical fracture of right femur secondary to mechanical fall, s/p open reduction and internal fixation using hip screw, 1/23/2022  -Has not been able to bear weight or stand up yet.  Needs 2 person assist.  -Management as per orthopedics.  -Weightbearing status, DVT prophylaxis, PT/OT as per orthopedics  -Will need TCU    #.  Ruptured Baker's cyst of left lower extremity - causing calf and posterior thigh pain-  -Patient reports left calf and left posterior thigh pain since her fall.  There is tenderness on squeezing the calf.  No fractures or deformity.  - US negative for DVT but showed likely ruptured baker's cyst.   - continue PT and pain control with tylenol, prn oxycodone and prn IV dilaudid    #.  Gradually worsening chronic iron deficiency anemia  -Presented with hemoglobin of 7.9.  Received 1 unit PRBC perioperatively.  Received IV iron at outside hospital prior to transfer  -Hemoglobin 8.4 and stable  -Records reviewed.  Hemoglobin was 11.1 a year ago and since then has gradually been trending down, was 9.7 on 1/13/2022.  This is most likely secondary to iron deficiency given her  history of gastric bypass surgery.  -Recent labs showed ferritin of 13, normal vitamin B12   -Assessed by GI-does not appear to have any GI bleed.  EGD not recommended    #.  Previous esophagitis and anastomotic ulcer  -EGD 2018 showed anastomotic ulcer  -On PPI, continue Protonix.  -Was taking NSAIDs PTA, also daily alcohol use  -Avoid NSAIDs    #.  S/p Kavon-en-Y gastric bypass-has been noncompliant with supplements    #.  Diabetes mellitus type 2, without long-term use of insulin, uncontrolled with most recent hemoglobin A1c 10.4, with hyperglycemia on admission, blood sugar over 400  -PTA Trulicity on hold  -On Lantus + sliding scale  -Blood sugars running over 200  -Increase lantus to 20 units daily and add novolog 2 units per carb tid with meals  - continue sliding scale and continue to adjust insulin accordingly    #.  Alcohol use disorder   -Longstanding history of alcohol use.  Used to drink heavily until 2009, then quit for 12 years after inpatient treatment.  -Relapsed few months ago and now drinks 2-3 drinks per day.  She is not interested in quitting alcohol  -no signs of alcohol withdrawal, discharge [facility] protocol.   - completed 5 course of thiamine    #.  Alcoholic hepatitis -has elevated AST and ultrasound with fatty infiltration    #.  Distended gallbladder on ultrasound, without evidence of acute cholecystitis-  -No right upper quadrant pain or tenderness but has distended gallbladder with mild thickening and mildly dilated CBD.  -GI has recommended outpatient right upper quadrant ultrasound.  Unable to perform EUS due to altered anatomy from previous gastric bypass surgery    #.  New diagnosis of diastolic CHF exacerbation with preserved EF of 55-55%   -No acute exacerbation   -Echo also showed LVH, grade 1 diastolic dysfunction, increased PA pressure 45 mmHg   -Outpatient sleep study recommended for suspected RANI   -Continue Lasix 40 mg daily which is her PTA medicine     #.  Essential  hypertension-controlled on losartan  -continue losartan    #.  Vitamin D deficiency-recent vitamin D low at 8  - start on ergocalciferol 50,000 units weekly     #.  Hyperlipidemia-recent lipid panel showed , HDL 84, triglycerides 106    #. Chronic major depression -   - reports chronic hopelessness. No suicidal ideation   - on wellbutrin, will continue        Diet: Diet  Consistent Carbohydrate Diet Low Consistent Carb (45 g CHO per Meal) Diet    DVT Prophylaxis: Aspirin, SCDs  Stanford Catheter: Not present  Central Lines: None  Cardiac Monitoring: None  Code Status: No CPR- Do NOT Intubate      Disposition Plan   Expected Discharge: 01/28/2022     [facility]    Delays:     Other (Add Comment)            The patient's care was discussed with the Patient and Patient's Family.    Glenna Serna MD  Hospitalist Service  [facility]  Securely message with the Vocera Web Console (learn more here)  Text page via [employer] Paging/Directory         Clinically Significant Risk Factors Present on Admission                   ______________________________________________________________________    Interval History    Patient reports she is not doing well.  She is still unable to bear weight on her left leg.  Complains of pain in left posterior thigh and left calf.  Pain in surgical area, right lower extremity is better controlled  Denies dizziness, chest pain or shortness of breath  No nausea or vomiting   no abdominal pain  No signs of alcohol withdrawal       Data reviewed today: I reviewed all medications, new labs and imaging results over the last 24 hours. I personally reviewed MRI lumbar spine today.    MRI lumbar spine 1/26/22-   1.  Mild to moderate multilevel lumbar spondylosis.  2.  Mild narrowing of the lateral recesses at several levels without high-grade central spinal canal stenosis.  3.  Moderate right neural foraminal stenosis at L1-L2. Mild  right neural foraminal stenosis at L4-L5.  4.  Chronic superior endplate compression deformities of L1 and L5.    US left leg 1/26/22-   1. No evidence of deep venous thrombosis in the left lower extremity.  2. Left Baker's cyst measuring 4.1 x 2.8 x 0.4 cm with surrounding significant edema. May represent a ruptured Baker's cyst.    Xray right knee 1/25/22-   1.  Right total knee arthroplasty with patellar resurfacing. The components are well seated without evidence of complication. Small joint effusion.  2.  ORIF healed distal femur fracture with lateral plate and screw fixation.  3.  Bone demineralization.  4.  Atherosclerotic calcification.    Xray right femur 1/25/22-   1.  ORIF right proximal femur fracture with lateral plate and femoral neck screw fixation. The orthopedic hardware is intact and the fracture is reduced. The femoral neck screw is within the lateral margin of the femoral neck.  2.  Plate and screw fixation of a healed distal femur fracture with lateral plate and screw fixation. Total knee arthroplasty.  3.  Mild right hip and sacroiliac degenerative arthrosis.  4.  Skin staples in the lateral thigh.    US abdomen 1/23/22-   1.  Mild extrahepatic bile duct dilatation and borderline pancreatic ductal dilatation of uncertain etiology.   2.  Distended gallbladder without definite cholecystitis. Somewhat unusual focal thickening and/or calcification in the posterior gallbladder wall of uncertain etiology.   3.  Extensive fatty infiltration of the liver.     xray pelvis and hip 1/23/22-                                       IMPRESSION: Acute mildly impacted basicervical fracture of the right femur. Questionable nondisplaced fracture of the right superior pubic ramus. No dislocation. Bones are demineralized. Right femoral fixation plate and screws incompletely imaged.   Extensive peripheral arterial calcifications.    Physical Exam   Vital Signs: Temp: 97.9  F (36.6  C) Temp src: Oral BP: (!) 175/82  Pulse: 84   Resp: 18 SpO2: 95 % O2 Device: None (Room air)    Weight: 213 lbs 13.54 oz    Constitutional: Awake, alert, cooperative, no apparent distress  Respiratory: Clear to auscultation bilaterally, no crackles or wheezing  Cardiovascular: Regular rate and rhythm, normal S1 and S2, and no murmur noted  GI: Normal bowel sounds, soft, non-distended, non-tender  Skin/Integumen: No rashes, no cyanosis, trace LE edema  MSK: R hip with lateral incision, clean moderate swelling throughout, tenderness in left leg with calf squeezing    Data   Recent Labs   Lab 01/27/22  1239 01/27/22  0748 01/27/22  0636 01/25/22  1140 01/25/22  0924 01/24/22  0908 01/24/22  0845 01/23/22  2034 01/23/22 2000 01/23/22  0757 01/23/22  0444 01/23/22  0433 01/23/22  0051 01/23/22  0034   WBC  --   --   --   --   --   --   --   --   --   --   --   --   --  7.4   HGB  --   --   --   --  8.4*  --  8.4*  --  8.5*   < > 7.6*  --   --  7.9*   MCV  --   --   --   --   --   --   --   --   --   --   --   --   --  68*   PLT  --   --   --   --   --   --   --   --   --   --   --   --   --  265   INR  --   --   --   --   --   --   --   --   --   --   --   --  1.01  --    NA  --   --   --   --  132*  --  135  --   --   --   --   --   --  135   POTASSIUM  --   --   --   --  3.6  --  3.5  --   --   --  3.6  --   --  3.6   CHLORIDE  --   --   --   --  99  --  103  --   --   --   --   --   --  101   CO2  --   --   --   --  29  --  26  --   --   --   --   --   --  26   BUN  --   --   --   --  18  --  16  --   --   --   --   --   --  11   CR  --   --   --   --  0.95  --  1.00  --   --   --   --   --   --  0.78   ANIONGAP  --   --   --   --  4  --  6  --   --   --   --   --   --  8   GABRIEL  --   --   --   --  8.2*  --  8.1*  --   --   --   --   --   --  8.2*   * 232* 241*   < > 293*   < > 229*   < >  --    < >  --    < >  --  411*   ALBUMIN  --   --   --   --  2.7*  --  3.0*  --   --   --   --   --   --  3.3*   PROTTOTAL  --   --   --   --  6.8  --  7.0   --   --   --   --   --   --  7.0   BILITOTAL  --   --   --   --  0.7  --  0.6  --   --   --   --   --   --  0.4   ALKPHOS  --   --   --   --  144  --  121  --   --   --   --   --   --  115   ALT  --   --   --   --  46  --  38  --   --   --   --   --   --  51*   AST  --   --   --   --  87*  --  55*  --   --   --   --   --   --  91*    < > = values in this interval not displayed.     Medications       aspirin  325 mg Oral Daily     buPROPion  200 mg Oral BID     folic acid  1 mg Oral Daily     furosemide  40 mg Oral Daily     insulin aspart   Subcutaneous Daily with breakfast     insulin aspart   Subcutaneous Daily with lunch     insulin aspart   Subcutaneous Daily with supper     insulin aspart  1-10 Units Subcutaneous TID AC     insulin aspart  1-7 Units Subcutaneous At Bedtime     [START ON 1/28/2022] insulin glargine  20 Units Subcutaneous QAM AC     losartan  25 mg Oral Daily     multivitamin w/minerals  1 tablet Oral Daily     pantoprazole  40 mg Oral BID AC     polyethylene glycol  17 g Oral Daily     senna-docusate  1 tablet Oral BID    Or     senna-docusate  2 tablet Oral BID

## 2022-01-27 NOTE — PLAN OF CARE
Pt Aox4. Reg diet. Pt rating pain as 10/10; managed with scheduled tylenol & PRN oxycodone, atarax, & dilaudid. Voiding via purewick. IV SL. Up with 2 assist & lift. BG checks & CIWA scores of 0. Continue to monitor.

## 2022-01-27 NOTE — PROGRESS NOTES
Orthopedic Surgery  Lamar Menendez  2022  Admit Date:  2022  POD 4 Days Post-Op  S/P Procedure(s):  OPEN REDUCTION INTERNAL FIXATION RIGHT HIP    Patient resting comfortably in bed.    Pain controlled.  Tolerating oral intake.    Denies nausea or vomiting  Denies chest pain or shortness of breath  No events overnight.     Alert and orient to person, place, and time.  Vital Sign Ranges  Temperature Temp  Av.4  F (36.9  C)  Min: 97.9  F (36.6  C)  Max: 99.3  F (37.4  C)   Blood pressure Systolic (24hrs), Av , Min:168 , Max:175        Diastolic (24hrs), Av, Min:82, Max:85      Pulse Pulse  Av  Min: 84  Max: 96   Respirations Resp  Av  Min: 16  Max: 20   Pulse oximetry SpO2  Av.7 %  Min: 94 %  Max: 95 %       Dressing is clean, dry, and intact.   Minimal erythema of the surrounding skin.   Bilateral calves are soft, non-tender.  Bilateral lower extremity is NVI.  Sensation intact bilateral lower extremities  5/5 motor with resisted dorsi and plantar flexion bilaterally  +Dp pulse    Lumbar MRI negative for acute fracture or contributing pathology  Labs:  Recent Labs   Lab Test 22  0924 22  0845 22  0444   POTASSIUM 3.6 3.5 3.6     Recent Labs   Lab Test 22  0924 22  0845 22  2000   HGB 8.4* 8.4* 8.5*     Recent Labs   Lab Test 22  0051   INR 1.01     Recent Labs   Lab Test 22  0034          A/P  1. S/p right femur fracture DHS   Continue ASA for DVT prophylaxis.     Mobilize with PT/OT    50% WB right LE   Leave dressing intact.     Continue current pain regiment.   Continue symptomatic support    2. Disposition   Anticipate d/c to TCU based on placement.    Carolyne Pierce PA-C

## 2022-01-28 ENCOUNTER — APPOINTMENT (OUTPATIENT)
Dept: PHYSICAL THERAPY | Facility: CLINIC | Age: 62
DRG: 481 | End: 2022-01-28
Payer: COMMERCIAL

## 2022-01-28 ENCOUNTER — APPOINTMENT (OUTPATIENT)
Dept: MRI IMAGING | Facility: CLINIC | Age: 62
DRG: 481 | End: 2022-01-28
Attending: PHYSICIAN ASSISTANT
Payer: COMMERCIAL

## 2022-01-28 LAB
ANION GAP SERPL CALCULATED.3IONS-SCNC: 5 MMOL/L (ref 3–14)
BUN SERPL-MCNC: 15 MG/DL (ref 7–30)
CALCIUM SERPL-MCNC: 8.9 MG/DL (ref 8.5–10.1)
CHLORIDE BLD-SCNC: 97 MMOL/L (ref 94–109)
CO2 SERPL-SCNC: 31 MMOL/L (ref 20–32)
CREAT SERPL-MCNC: 0.8 MG/DL (ref 0.52–1.04)
GFR SERPL CREATININE-BSD FRML MDRD: 82 ML/MIN/1.73M2
GLUCOSE BLD-MCNC: 194 MG/DL (ref 70–99)
GLUCOSE BLDC GLUCOMTR-MCNC: 101 MG/DL (ref 70–99)
GLUCOSE BLDC GLUCOMTR-MCNC: 109 MG/DL (ref 70–99)
GLUCOSE BLDC GLUCOMTR-MCNC: 187 MG/DL (ref 70–99)
GLUCOSE BLDC GLUCOMTR-MCNC: 189 MG/DL (ref 70–99)
GLUCOSE BLDC GLUCOMTR-MCNC: 90 MG/DL (ref 70–99)
HGB BLD-MCNC: 8.8 G/DL (ref 11.7–15.7)
POTASSIUM BLD-SCNC: 3.6 MMOL/L (ref 3.4–5.3)
SODIUM SERPL-SCNC: 133 MMOL/L (ref 133–144)

## 2022-01-28 PROCEDURE — 120N000001 HC R&B MED SURG/OB

## 2022-01-28 PROCEDURE — 250N000013 HC RX MED GY IP 250 OP 250 PS 637: Performed by: PHYSICIAN ASSISTANT

## 2022-01-28 PROCEDURE — 80048 BASIC METABOLIC PNL TOTAL CA: CPT | Performed by: INTERNAL MEDICINE

## 2022-01-28 PROCEDURE — 250N000013 HC RX MED GY IP 250 OP 250 PS 637: Performed by: INTERNAL MEDICINE

## 2022-01-28 PROCEDURE — 97530 THERAPEUTIC ACTIVITIES: CPT | Mod: GP | Performed by: PHYSICAL THERAPIST

## 2022-01-28 PROCEDURE — 73721 MRI JNT OF LWR EXTRE W/O DYE: CPT | Mod: LT

## 2022-01-28 PROCEDURE — 85018 HEMOGLOBIN: CPT | Performed by: INTERNAL MEDICINE

## 2022-01-28 PROCEDURE — 250N000013 HC RX MED GY IP 250 OP 250 PS 637: Performed by: STUDENT IN AN ORGANIZED HEALTH CARE EDUCATION/TRAINING PROGRAM

## 2022-01-28 PROCEDURE — 36415 COLL VENOUS BLD VENIPUNCTURE: CPT | Performed by: INTERNAL MEDICINE

## 2022-01-28 PROCEDURE — 99232 SBSQ HOSP IP/OBS MODERATE 35: CPT | Performed by: INTERNAL MEDICINE

## 2022-01-28 RX ORDER — CYCLOBENZAPRINE HCL 10 MG
10 TABLET ORAL 3 TIMES DAILY
Status: DISCONTINUED | OUTPATIENT
Start: 2022-01-28 | End: 2022-02-02 | Stop reason: HOSPADM

## 2022-01-28 RX ORDER — HYDROMORPHONE HYDROCHLORIDE 2 MG/1
2-4 TABLET ORAL
Status: DISCONTINUED | OUTPATIENT
Start: 2022-01-28 | End: 2022-02-02 | Stop reason: HOSPADM

## 2022-01-28 RX ORDER — HYDROMORPHONE HYDROCHLORIDE 2 MG/1
2 TABLET ORAL
Status: DISCONTINUED | OUTPATIENT
Start: 2022-01-28 | End: 2022-01-28

## 2022-01-28 RX ADMIN — LOSARTAN POTASSIUM 50 MG: 50 TABLET, FILM COATED ORAL at 08:28

## 2022-01-28 RX ADMIN — ACETAMINOPHEN 650 MG: 325 TABLET, FILM COATED ORAL at 21:24

## 2022-01-28 RX ADMIN — HYDROMORPHONE HYDROCHLORIDE 4 MG: 2 TABLET ORAL at 13:00

## 2022-01-28 RX ADMIN — ACETAMINOPHEN 650 MG: 325 TABLET, FILM COATED ORAL at 15:33

## 2022-01-28 RX ADMIN — METHOCARBAMOL 500 MG: 500 TABLET ORAL at 06:40

## 2022-01-28 RX ADMIN — HYDROXYZINE HYDROCHLORIDE 25 MG: 25 TABLET ORAL at 09:01

## 2022-01-28 RX ADMIN — SENNOSIDES AND DOCUSATE SODIUM 1 TABLET: 50; 8.6 TABLET ORAL at 08:29

## 2022-01-28 RX ADMIN — BUPROPION HYDROCHLORIDE 200 MG: 200 TABLET, EXTENDED RELEASE ORAL at 08:28

## 2022-01-28 RX ADMIN — OXYCODONE HYDROCHLORIDE 10 MG: 5 TABLET ORAL at 06:59

## 2022-01-28 RX ADMIN — FUROSEMIDE 40 MG: 40 TABLET ORAL at 08:27

## 2022-01-28 RX ADMIN — MAGNESIUM HYDROXIDE 30 ML: 400 SUSPENSION ORAL at 21:24

## 2022-01-28 RX ADMIN — CYCLOBENZAPRINE 10 MG: 10 TABLET, FILM COATED ORAL at 09:23

## 2022-01-28 RX ADMIN — CALCIUM CARBONATE (ANTACID) CHEW TAB 500 MG 500 MG: 500 CHEW TAB at 08:39

## 2022-01-28 RX ADMIN — PANTOPRAZOLE SODIUM 40 MG: 40 TABLET, DELAYED RELEASE ORAL at 16:22

## 2022-01-28 RX ADMIN — ASPIRIN 325 MG: 325 TABLET, DELAYED RELEASE ORAL at 08:28

## 2022-01-28 RX ADMIN — BUPROPION HYDROCHLORIDE 200 MG: 200 TABLET, EXTENDED RELEASE ORAL at 16:22

## 2022-01-28 RX ADMIN — PANTOPRAZOLE SODIUM 40 MG: 40 TABLET, DELAYED RELEASE ORAL at 06:40

## 2022-01-28 RX ADMIN — CYCLOBENZAPRINE 10 MG: 10 TABLET, FILM COATED ORAL at 16:22

## 2022-01-28 RX ADMIN — SENNOSIDES AND DOCUSATE SODIUM 1 TABLET: 50; 8.6 TABLET ORAL at 21:25

## 2022-01-28 RX ADMIN — CYCLOBENZAPRINE 10 MG: 10 TABLET, FILM COATED ORAL at 21:25

## 2022-01-28 RX ADMIN — HYDROXYZINE HYDROCHLORIDE 25 MG: 25 TABLET ORAL at 15:33

## 2022-01-28 RX ADMIN — ACETAMINOPHEN 650 MG: 325 TABLET, FILM COATED ORAL at 00:59

## 2022-01-28 RX ADMIN — ACETAMINOPHEN 650 MG: 325 TABLET, FILM COATED ORAL at 08:39

## 2022-01-28 RX ADMIN — OXYCODONE HYDROCHLORIDE 10 MG: 5 TABLET ORAL at 00:59

## 2022-01-28 RX ADMIN — HYDROMORPHONE HYDROCHLORIDE 4 MG: 2 TABLET ORAL at 10:02

## 2022-01-28 RX ADMIN — HYDROMORPHONE HYDROCHLORIDE 4 MG: 2 TABLET ORAL at 19:53

## 2022-01-28 RX ADMIN — HYDROMORPHONE HYDROCHLORIDE 4 MG: 2 TABLET ORAL at 16:22

## 2022-01-28 RX ADMIN — METOPROLOL SUCCINATE 25 MG: 25 TABLET, EXTENDED RELEASE ORAL at 08:28

## 2022-01-28 RX ADMIN — POLYETHYLENE GLYCOL 3350 17 G: 17 POWDER, FOR SOLUTION ORAL at 08:29

## 2022-01-28 RX ADMIN — HYDROXYZINE HYDROCHLORIDE 25 MG: 25 TABLET ORAL at 21:25

## 2022-01-28 ASSESSMENT — ACTIVITIES OF DAILY LIVING (ADL)
ADLS_ACUITY_SCORE: 10
ADLS_ACUITY_SCORE: 10
ADLS_ACUITY_SCORE: 14
ADLS_ACUITY_SCORE: 14
ADLS_ACUITY_SCORE: 10
ADLS_ACUITY_SCORE: 15
ADLS_ACUITY_SCORE: 14
ADLS_ACUITY_SCORE: 10
ADLS_ACUITY_SCORE: 15
ADLS_ACUITY_SCORE: 14
ADLS_ACUITY_SCORE: 10
ADLS_ACUITY_SCORE: 10
ADLS_ACUITY_SCORE: 14
ADLS_ACUITY_SCORE: 15
ADLS_ACUITY_SCORE: 14

## 2022-01-28 NOTE — PLAN OF CARE
Pt A&Ox4, dressing is clean, dry, and intact. Pure wick in place. IV saline locked in R upper forearm. Pt is assist of two with cares. Pt. Intolerant to activities that involve getting out of bed. Pain managed with oral PRN pain meds: oxycodone, atarax & robaxin. IV PRN pain med: dilaudid . Diabetic; BG monitoring, carb counting with meals, and insulin. Diet: regular

## 2022-01-28 NOTE — PROGRESS NOTES
Care Management Follow Up    Length of Stay (days): 5    Expected Discharge Date: 01/29/2022     Concerns to be Addressed: discharge planning     Patient plan of care discussed at interdisciplinary rounds: Yes    Anticipated Discharge Disposition: Transitional Care     Anticipated Discharge Services: None  Anticipated Discharge DME: None    Patient/family educated on Medicare website which has current facility and service quality ratings: no  Education Provided on the Discharge Plan:    Patient/Family in Agreement with the Plan: yes    Referrals Placed by CM/SW: Post Acute Facilities  Private pay costs discussed: Not applicable    Additional Information:  Call to [facility] and patient is being reviewed. Writer informed that patient could be accepted pending [employer] auth likely Monday. They would like SW to call back to confirm Monday.       CAROL [place]

## 2022-01-28 NOTE — PLAN OF CARE
POx5 ORIF R hip. Up A2 PWB RLE. BP HTN 150s. OVSS RA. LLE n/t, otherwise CMS intact. R hip dressing CDI. BLE pain managed with PRN oxycodone, robaxin and dilaudid. Purewick in place with AUO. , 187. Low carb diet. Plan discharge pending TCU placement.

## 2022-01-28 NOTE — PROGRESS NOTES
[facility]    Medicine Progress Note - Hospitalist Service    Date of Admission:  1/23/2022    Assessment & Plan          Lamar Menendez is a pleasant 61-year-old female with hypertension, diabetes mellitus type 2, alcohol use disorder, previous gastric bypass surgery, previous esophagitis and anastomotic ulcer, iron deficiency anemia, depression/anxiety, PTSD who was brought in due to right femoral neck fracture secondary to mechanical fall at home.  She had 2 falls at home, first she slipped on a rug and was seen at outside hospital.  Work-up was negative for fracture.  When she returned home, her right leg gave out with weightbearing resulting in second fall.  She was then brought to [facility] and found to have right femur basicervical fracture.    #.  Acute, mildly impacted, basicervical fracture of right femur secondary to mechanical fall, s/p open reduction and internal fixation using hip screw, 1/23/2022  -Doing well.  Pain is controlled.    -Needs 2 person assist, mainly due to left lower extremity pain  -Management as per orthopedics.  -Weightbearing status, DVT prophylaxis, PT/OT as per orthopedics  -Will need TCU    #.  Ruptured Baker's cyst of left lower extremity - causing calf and posterior thigh pain-  -Patient reports left calf and left posterior thigh pain since her fall.  There is tenderness on squeezing the calf.  No fractures or deformity.  - US negative for DVT but showed likely ruptured baker's cyst.   - continue PT and pain control with tylenol, prn Dilaudid     #.  Gradually worsening chronic iron deficiency anemia  -Presented with hemoglobin of 7.9.  Received 1 unit PRBC perioperatively.  Received IV iron at outside hospital prior to transfer  -Hemoglobin 8.4 and stable  -Records reviewed.  Hemoglobin was 11.1 a year ago and since then has gradually been trending down, was 9.7 on 1/13/2022.  This is most likely secondary to iron deficiency given her  history of gastric bypass surgery.  -Recent labs showed ferritin of 13, normal vitamin B12   -Assessed by GI-does not appear to have any GI bleed.  EGD not recommended    #.  Previous esophagitis and anastomotic ulcer  -EGD 2018 showed anastomotic ulcer  -On PPI, continue Protonix.  -Was taking NSAIDs PTA, also daily alcohol use  -Avoid NSAIDs    #.  S/p Kavon-en-Y gastric bypass-has been noncompliant with supplements    #.  Diabetes mellitus type 2, without long-term use of insulin, uncontrolled with most recent hemoglobin A1c 10.4, with hyperglycemia on admission, blood sugar over 400  -PTA Trulicity on hold  -On Lantus + NovoLog carb coverage with meals and sliding scale  -Blood sugars improved, now less than 200  - continue lantus to 20 units daily, continue novolog 2 units per carb tid with meals  - continue sliding scale and continue to adjust insulin accordingly    #.  Alcohol use disorder   -Longstanding history of alcohol use.  Used to drink heavily until 2009, then quit for 12 years after inpatient treatment.  -Relapsed few months ago and now drinks 2-3 drinks per day.  She is not interested in quitting alcohol  -no signs of alcohol withdrawal, CIWA protocol discontinued  - completed 5 course of thiamine    #.  Alcoholic hepatitis -has elevated AST and ultrasound with fatty infiltration    #.  Distended gallbladder on ultrasound, without evidence of acute cholecystitis-  -No right upper quadrant pain or tenderness but has distended gallbladder with mild thickening and mildly dilated CBD.  -GI has recommended outpatient right upper quadrant ultrasound.  Unable to perform EUS due to altered anatomy from previous gastric bypass surgery    #.  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  Essential hypertension-  -Blood pressure mildly elevated.  Losartan increased to 50 mg daily, continue   -Also started on Toprol-XL 25 mg, continue    #.  Vitamin D deficiency-recent vitamin D low at 8  -Continue ergocalciferol 50,000 units weekly, started on 1/27    #.  Hyperlipidemia-recent lipid panel showed , HDL 84, triglycerides 106    #. Chronic major depression -   - reports chronic hopelessness. No suicidal ideation   - on wellbutrin, will continue        Diet: Diet  Consistent Carbohydrate Diet Low Consistent Carb (45 g CHO per Meal) Diet    DVT Prophylaxis: Aspirin, SCDs  Stanford Catheter: Not present  Central Lines: None  Cardiac Monitoring: None  Code Status: No CPR- Do NOT Intubate      Disposition Plan   Expected Discharge: 01/29/2022     Anticipated discharge location: [facility]    Delays:     Placement - TCU            The patient's care was discussed with the Patient     Glenna Serna MD  Hospitalist Service  [facility]  Securely message with the Vocera Web Console (learn more here)  Text page via [employer] Paging/Directory         Clinically Significant Risk Factors Present on Admission                   ______________________________________________________________________    Interval History    Patient reports ongoing pain in the left lower extremity   Pain pain right leg with hip fracture is controlled   Has difficulty ambulating due to left lower extremity pain   Denies dizziness, chest pain or shortness of breath  No nausea or vomiting   no abdominal pain  No signs of alcohol withdrawal       Data reviewed today: I reviewed all medications, new labs and imaging results over the last 24 hours.     MRI lumbar spine 1/26/22-   1.  Mild to moderate multilevel lumbar spondylosis.  2.  Mild narrowing of the lateral recesses at several levels without high-grade central spinal canal stenosis.  3.  Moderate right neural  foraminal stenosis at L1-L2. Mild right neural foraminal stenosis at L4-L5.  4.  Chronic superior endplate compression deformities of L1 and L5.    US left leg 1/26/22-   1. No evidence of deep venous thrombosis in the left lower extremity.  2. Left Baker's cyst measuring 4.1 x 2.8 x 0.4 cm with surrounding significant edema. May represent a ruptured Baker's cyst.    Xray right knee 1/25/22-   1.  Right total knee arthroplasty with patellar resurfacing. The components are well seated without evidence of complication. Small joint effusion.  2.  ORIF healed distal femur fracture with lateral plate and screw fixation.  3.  Bone demineralization.  4.  Atherosclerotic calcification.    Xray right femur 1/25/22-   1.  ORIF right proximal femur fracture with lateral plate and femoral neck screw fixation. The orthopedic hardware is intact and the fracture is reduced. The femoral neck screw is within the lateral margin of the femoral neck.  2.  Plate and screw fixation of a healed distal femur fracture with lateral plate and screw fixation. Total knee arthroplasty.  3.  Mild right hip and sacroiliac degenerative arthrosis.  4.  Skin staples in the lateral thigh.    US abdomen 1/23/22-   1.  Mild extrahepatic bile duct dilatation and borderline pancreatic ductal dilatation of uncertain etiology.   2.  Distended gallbladder without definite cholecystitis. Somewhat unusual focal thickening and/or calcification in the posterior gallbladder wall of uncertain etiology.   3.  Extensive fatty infiltration of the liver.     xray pelvis and hip 1/23/22-                                       IMPRESSION: Acute mildly impacted basicervical fracture of the right femur. Questionable nondisplaced fracture of the right superior pubic ramus. No dislocation. Bones are demineralized. Right femoral fixation plate and screws incompletely imaged.   Extensive peripheral arterial calcifications.    Physical Exam   Vital Signs: Temp: 98.2  F (36.8   C) Temp src: Oral BP: (!) 153/75 Pulse: 76   Resp: 16 SpO2: 98 % O2 Device: None (Room air)    Weight: 213 lbs 13.54 oz    Constitutional: Awake, alert, cooperative, no apparent distress  Respiratory: Clear to auscultation bilaterally, no crackles or wheezing  Cardiovascular: Regular rate and rhythm, normal S1 and S2, and no murmur noted  GI: Normal bowel sounds, soft, non-distended, non-tender  Skin/Integumen: No rashes, no cyanosis, trace LE edema  MSK: R hip with lateral incision, clean moderate swelling throughout, tenderness in left leg with calf squeezing    Data   Recent Labs   Lab 01/28/22  0905 01/28/22  0804 01/28/22  0203 01/25/22  1140 01/25/22  0924 01/24/22  0908 01/24/22  0845 01/23/22  0433 01/23/22  0051 01/23/22  0034   WBC  --   --   --   --   --   --   --   --   --  7.4   HGB 8.8*  --   --   --  8.4*  --  8.4*   < >  --  7.9*   MCV  --   --   --   --   --   --   --   --   --  68*   PLT  --   --   --   --   --   --   --   --   --  265   INR  --   --   --   --   --   --   --   --  1.01  --      --   --   --  132*  --  135  --   --  135   POTASSIUM 3.6  --   --   --  3.6  --  3.5   < >  --  3.6   CHLORIDE 97  --   --   --  99  --  103  --   --  101   CO2 31  --   --   --  29  --  26  --   --  26   BUN 15  --   --   --  18  --  16  --   --  11   CR 0.80  --   --   --  0.95  --  1.00  --   --  0.78   ANIONGAP 5  --   --   --  4  --  6  --   --  8   GABRIEL 8.9  --   --   --  8.2*  --  8.1*  --   --  8.2*   * 189* 187*   < > 293*   < > 229*   < >  --  411*   ALBUMIN  --   --   --   --  2.7*  --  3.0*  --   --  3.3*   PROTTOTAL  --   --   --   --  6.8  --  7.0  --   --  7.0   BILITOTAL  --   --   --   --  0.7  --  0.6  --   --  0.4   ALKPHOS  --   --   --   --  144  --  121  --   --  115   ALT  --   --   --   --  46  --  38  --   --  51*   AST  --   --   --   --  87*  --  55*  --   --  91*    < > = values in this interval not displayed.     Medications       aspirin  325 mg Oral Daily      buPROPion  200 mg Oral BID     cyclobenzaprine  10 mg Oral TID     folic acid  1 mg Oral Daily     furosemide  40 mg Oral Daily     insulin aspart   Subcutaneous Daily with breakfast     insulin aspart   Subcutaneous Daily with lunch     insulin aspart   Subcutaneous Daily with supper     insulin aspart  1-10 Units Subcutaneous TID AC     insulin aspart  1-7 Units Subcutaneous At Bedtime     insulin glargine  20 Units Subcutaneous QAM AC     losartan  50 mg Oral Daily     metoprolol succinate ER  25 mg Oral Daily     multivitamin w/minerals  1 tablet Oral Daily     pantoprazole  40 mg Oral BID AC     polyethylene glycol  17 g Oral Daily     senna-docusate  1 tablet Oral BID    Or     senna-docusate  2 tablet Oral BID     vitamin D2  50,000 Units Oral Q7 Days

## 2022-01-28 NOTE — PLAN OF CARE
VSS, CMS intact. Up with strong assist of 2 and lift. Taking oral dilaudid, flexeril, atarax, and tylenol. Rating pain 10/10 with movement. Left knee MRI ordered. Incontinent of bladder, purewick in place. A&0X4. Discharge pending placement.

## 2022-01-28 NOTE — PROVIDER NOTIFICATION
Called to clarify about IV dilaudid. Order not to give iv, but oral med changed from oxycodone to dilaudid, flexeril added.

## 2022-01-28 NOTE — PROGRESS NOTES
Orthopedic Surgery  Lamar Menendez  2022  Admit Date:  2022  POD 5 Days Post-Op  S/P Procedure(s):  OPEN REDUCTION INTERNAL FIXATION RIGHT HIP    Patient resting comfortably in bed.    Pain controlled at rest. When WB with PT severe pain in back of left knee down calf  Tolerating oral intake.    Denies nausea or vomiting  Denies chest pain or shortness of breath  No events overnight.     Alert and orient to person, place, and time.  Vital Sign Ranges  Temperature Temp  Av.2  F (36.8  C)  Min: 97.7  F (36.5  C)  Max: 98.6  F (37  C)   Blood pressure Systolic (24hrs), Av , Min:148 , Max:175        Diastolic (24hrs), Av, Min:72, Max:82      Pulse Pulse  Av.3  Min: 76  Max: 81   Respirations Resp  Av.5  Min: 16  Max: 18   Pulse oximetry SpO2  Av %  Min: 94 %  Max: 98 %       Dressing is clean, dry, and intact.   Minimal erythema of the surrounding skin.   Bilateral calves are soft, non-tender.  Bilateral lower extremity is NVI.  Sensation intact bilateral lower extremities  5/5 motor with resisted dorsi and plantar flexion bilaterally  +Dp pulse    Left knee without effusion  Non-TTP  Posterior calf painful with WB  No ecchymosis or erythema  Labs:  Recent Labs   Lab Test 22  0905 22  0924 22  0845   POTASSIUM 3.6 3.6 3.5     Recent Labs   Lab Test 22  0905 22  0924 22  0845   HGB 8.8* 8.4* 8.4*     Recent Labs   Lab Test 22  0051   INR 1.01     Recent Labs   Lab Test 22  0034        A/P  1. S/p right femur fracture DHS              Continue ASA for DVT prophylaxis.                Mobilize with PT/OT               50% WB right LE              Leave dressing intact.                Continue current pain regiment.              Continue symptomatic support   MRI of left knee ordered     2. Disposition              Anticipate d/c to TCU based on placement.    Carolyne Pierce PA-C

## 2022-01-29 LAB
GLUCOSE BLDC GLUCOMTR-MCNC: 119 MG/DL (ref 70–99)
GLUCOSE BLDC GLUCOMTR-MCNC: 140 MG/DL (ref 70–99)
GLUCOSE BLDC GLUCOMTR-MCNC: 162 MG/DL (ref 70–99)
GLUCOSE BLDC GLUCOMTR-MCNC: 96 MG/DL (ref 70–99)
MAGNESIUM SERPL-MCNC: 2.2 MG/DL (ref 1.6–2.3)
PHOSPHATE SERPL-MCNC: 3.6 MG/DL (ref 2.5–4.5)
POTASSIUM BLD-SCNC: 3.7 MMOL/L (ref 3.4–5.3)

## 2022-01-29 PROCEDURE — 36415 COLL VENOUS BLD VENIPUNCTURE: CPT | Performed by: INTERNAL MEDICINE

## 2022-01-29 PROCEDURE — 250N000013 HC RX MED GY IP 250 OP 250 PS 637: Performed by: PHYSICIAN ASSISTANT

## 2022-01-29 PROCEDURE — 250N000013 HC RX MED GY IP 250 OP 250 PS 637: Performed by: INTERNAL MEDICINE

## 2022-01-29 PROCEDURE — 250N000013 HC RX MED GY IP 250 OP 250 PS 637: Performed by: STUDENT IN AN ORGANIZED HEALTH CARE EDUCATION/TRAINING PROGRAM

## 2022-01-29 PROCEDURE — 120N000001 HC R&B MED SURG/OB

## 2022-01-29 PROCEDURE — 84100 ASSAY OF PHOSPHORUS: CPT | Performed by: INTERNAL MEDICINE

## 2022-01-29 PROCEDURE — 84132 ASSAY OF SERUM POTASSIUM: CPT | Performed by: INTERNAL MEDICINE

## 2022-01-29 PROCEDURE — 99232 SBSQ HOSP IP/OBS MODERATE 35: CPT | Performed by: INTERNAL MEDICINE

## 2022-01-29 PROCEDURE — 83735 ASSAY OF MAGNESIUM: CPT | Performed by: INTERNAL MEDICINE

## 2022-01-29 RX ORDER — LIDOCAINE HYDROCHLORIDE 20 MG/ML
4 INJECTION, SOLUTION EPIDURAL; INFILTRATION; INTRACAUDAL; PERINEURAL ONCE
Status: COMPLETED | OUTPATIENT
Start: 2022-01-29 | End: 2022-01-30

## 2022-01-29 RX ORDER — CARBOXYMETHYLCELLULOSE SODIUM 5 MG/ML
1 SOLUTION/ DROPS OPHTHALMIC
Status: DISCONTINUED | OUTPATIENT
Start: 2022-01-29 | End: 2022-02-02 | Stop reason: HOSPADM

## 2022-01-29 RX ORDER — LIDOCAINE HYDROCHLORIDE 20 MG/ML
4 INJECTION, SOLUTION INFILTRATION; PERINEURAL ONCE
Status: DISCONTINUED | OUTPATIENT
Start: 2022-01-29 | End: 2022-01-29

## 2022-01-29 RX ADMIN — CYCLOBENZAPRINE 10 MG: 10 TABLET, FILM COATED ORAL at 16:02

## 2022-01-29 RX ADMIN — HYDROXYZINE HYDROCHLORIDE 25 MG: 25 TABLET ORAL at 05:41

## 2022-01-29 RX ADMIN — HYDROMORPHONE HYDROCHLORIDE 4 MG: 2 TABLET ORAL at 21:13

## 2022-01-29 RX ADMIN — PANTOPRAZOLE SODIUM 40 MG: 40 TABLET, DELAYED RELEASE ORAL at 16:02

## 2022-01-29 RX ADMIN — HYDROXYZINE HYDROCHLORIDE 25 MG: 25 TABLET ORAL at 13:05

## 2022-01-29 RX ADMIN — CYCLOBENZAPRINE 10 MG: 10 TABLET, FILM COATED ORAL at 08:45

## 2022-01-29 RX ADMIN — BUPROPION HYDROCHLORIDE 200 MG: 200 TABLET, EXTENDED RELEASE ORAL at 16:02

## 2022-01-29 RX ADMIN — BUPROPION HYDROCHLORIDE 200 MG: 200 TABLET, EXTENDED RELEASE ORAL at 08:44

## 2022-01-29 RX ADMIN — FUROSEMIDE 40 MG: 40 TABLET ORAL at 08:44

## 2022-01-29 RX ADMIN — ASPIRIN 325 MG: 325 TABLET, DELAYED RELEASE ORAL at 08:44

## 2022-01-29 RX ADMIN — SENNOSIDES AND DOCUSATE SODIUM 2 TABLET: 50; 8.6 TABLET ORAL at 21:13

## 2022-01-29 RX ADMIN — HYDROMORPHONE HYDROCHLORIDE 4 MG: 2 TABLET ORAL at 13:05

## 2022-01-29 RX ADMIN — LOSARTAN POTASSIUM 50 MG: 50 TABLET, FILM COATED ORAL at 08:44

## 2022-01-29 RX ADMIN — METOPROLOL SUCCINATE 25 MG: 25 TABLET, EXTENDED RELEASE ORAL at 08:45

## 2022-01-29 RX ADMIN — CALCIUM CARBONATE (ANTACID) CHEW TAB 500 MG 500 MG: 500 CHEW TAB at 19:24

## 2022-01-29 RX ADMIN — SENNOSIDES AND DOCUSATE SODIUM 1 TABLET: 50; 8.6 TABLET ORAL at 08:51

## 2022-01-29 RX ADMIN — PANTOPRAZOLE SODIUM 40 MG: 40 TABLET, DELAYED RELEASE ORAL at 08:45

## 2022-01-29 RX ADMIN — HYDROMORPHONE HYDROCHLORIDE 4 MG: 2 TABLET ORAL at 08:44

## 2022-01-29 RX ADMIN — POLYETHYLENE GLYCOL 3350 17 G: 17 POWDER, FOR SOLUTION ORAL at 08:45

## 2022-01-29 RX ADMIN — CYCLOBENZAPRINE 10 MG: 10 TABLET, FILM COATED ORAL at 22:38

## 2022-01-29 RX ADMIN — HYDROMORPHONE HYDROCHLORIDE 4 MG: 2 TABLET ORAL at 16:02

## 2022-01-29 RX ADMIN — Medication 1 DROP: at 10:45

## 2022-01-29 RX ADMIN — HYDROMORPHONE HYDROCHLORIDE 4 MG: 2 TABLET ORAL at 04:36

## 2022-01-29 ASSESSMENT — ACTIVITIES OF DAILY LIVING (ADL)
ADLS_ACUITY_SCORE: 15

## 2022-01-29 NOTE — PROGRESS NOTES
Care Management Follow Up    Length of Stay (days): 6    Expected Discharge Date: 02/01/2022     Concerns to be Addressed: discharge planning     Patient plan of care discussed at interdisciplinary rounds: Yes    Anticipated Discharge Disposition: Transitional Care     Anticipated Discharge Services: None  Anticipated Discharge DME: None    Patient/family educated on Medicare website which has current facility and service quality ratings: no  Education Provided on the Discharge Plan:    Patient/Family in Agreement with the Plan: yes    Referrals Placed by CM/SW: Post Acute Facilities  Private pay costs discussed: Not applicable    Additional Information:  Accepted at [facility] pending human[employer] Auth, per notes anticipate would not get authorization until Monday. Writer left VM with admissions at [facility] to determine if Auth is received. Plan to follow up with admissions on Monday.       KENNEDY Benavidez, LGSW   Social Work   [facility]

## 2022-01-29 NOTE — PROGRESS NOTES
"Lamar Menendez  2022  POD #  6 ORIF, right hip       Patient resting comfortably in bed.    Pain controlled at rest. When WB with PT has left knee pain but denies any ivett instability. She describes this pain as more of a burning sensation at times. Denies any low back pain or pain into left hip.   Tolerating oral intake.    Denies nausea or vomiting  Denies chest pain or shortness of breath  No events overnight.   Blood pressure (!) 146/74, pulse 75, temperature 98.3  F (36.8  C), temperature source Oral, resp. rate 16, height 1.626 m (5' 4\"), weight 97 kg (213 lb 13.5 oz), SpO2 97 %.  Temperatures:  Current - Temp: 98.3  F (36.8  C); Max - Temp  Av.2  F (36.8  C)  Min: 98  F (36.7  C)  Max: 98.3  F (36.8  C)  Pulse range: Pulse  Av  Min: 71  Max: 75  Blood pressure range: Systolic (24hrs), Av , Min:146 , Max:151   ; Diastolic (24hrs), Av, Min:73, Max:74    CMS:       Dressing is clean, dry, and intact.   Minimal erythema of the surrounding skin.   Bilateral calves are soft, non-tender.  Bilateral lower extremity is NVI.  Sensation intact bilateral lower extremities  5/5 motor with resisted dorsi and plantar flexion bilaterally  +Dp pulse     Left knee without effusion  Non-TTP  Posterior calf painful with WB, however it is soft and non tender. No redness on left calf.   No ecchymosis or erythema  Labs:  Hemoglobin   Date Value Ref Range Status   2022 8.8 (L) 11.7 - 15.7 g/dL Final   ]  Lab Results   Component Value Date    INR 1.01 2022     Lab Results   Component Value Date     2022       PLAN:    1. S/p right femur fracture DHS              Continue ASA for DVT prophylaxis.                Mobilize with PT/OT               50% WB right LE              Leave dressing intact.                Continue current pain regiment.              Continue symptomatic support             MRI of left knee results communicated with patient, advised patient of lateral meniscus tear " with moderate  arthritic changes, advised patient that we can try icing today for pain management. Can start some PT exercises on left knee as well. Will try to order injection meds for possible CSI intra-articular into left knee for Sunday.      2. Disposition              Anticipate d/c to TCU based on placement.      Shalini Roman PA-C

## 2022-01-29 NOTE — PLAN OF CARE
VSS, CMS intact. Up with 2 and lift. Pain managed with oral dilaudid, atarax, flexeril and tylenol. Purewick in place for incontinent. A&OX4. Discharge pending insurance auth.

## 2022-01-29 NOTE — PROGRESS NOTES
Trauma/Ortho/Medical: Ortho    Diagnosis:Right ORIF, Ruptured bakers cyst behind left knee 50% WB on R leg  Mental Status: Alert and oriented x 4  Activity/dangle: Lift for transfer  Diet: CCHO  [place]/Voiding:Purewick  Tele/Restraints/Carine/A  02/LDA: ELIZ  D/C Date: TBD  Other Info: Had large BM

## 2022-01-29 NOTE — PLAN OF CARE
Right ORIF, Left ruptured Bakers cyst behind Left knee  A&O x4.   CMS Intact.   VSS on RA.   Bedrest d/t pain and NWB bilat. Use ceiling Lift for Transfers.   Voiding via PureWick.   Pain controlled with Dilaudid, Atarax, Flexeril, Tylenol.   Continue to monitor.   Awaiting MRI results of Left knee.

## 2022-01-29 NOTE — PROGRESS NOTES
[facility]    Medicine Progress Note - Hospitalist Service    Date of Admission:  1/23/2022    Assessment & Plan          Lamar Menendez is a pleasant 61-year-old female with hypertension, diabetes mellitus type 2, alcohol use disorder, previous gastric bypass surgery, previous esophagitis and anastomotic ulcer, iron deficiency anemia, depression/anxiety, PTSD who was brought in due to right femoral neck fracture secondary to mechanical fall at home.  She had 2 falls at home, first she slipped on a rug and was seen at outside hospital.  Work-up was negative for fracture.  When she returned home, her right leg gave out with weightbearing resulting in second fall.  She was then brought to [facility] and found to have right femur basicervical fracture.    #.  Acute, mildly impacted, basicervical fracture of right femur secondary to mechanical fall, s/p open reduction and internal fixation using hip screw, 1/23/2022  -Doing well.  Pain is controlled.    -Needs 2 person assist or lift, mainly due to left lower extremity pain  -Management as per orthopedics.  -Weightbearing status, DVT prophylaxis, PT/OT as per orthopedics  -Will need TCU    #.  Complete lateral meniscal tear of left knee   -Patient reports significant left calf and left posterior thigh pain since her fall.   - US negative for DVT but showed likely ruptured baker's cyst.   -MRI of the knee showed high-grade likely complete lateral meniscal tear, full-thickness cartilage loss over femoral condyle and tibial plateau  - continue PT and pain control with tylenol, prn Dilaudid   -Management as per orthopedics    #.  Gradually worsening chronic iron deficiency anemia  -Presented with hemoglobin of 7.9.  Received 1 unit PRBC perioperatively.  Received IV iron at outside hospital prior to transfer  -Hemoglobin 8.4 and stable  -Records reviewed.  Hemoglobin was 11.1 a year ago and since then has gradually been trending  down, was 9.7 on 1/13/2022.  This is most likely secondary to iron deficiency given her history of gastric bypass surgery.  -Recent labs showed ferritin of 13, normal vitamin B12   -Assessed by GI-does not appear to have any GI bleed.  EGD not recommended    #.  Previous esophagitis and anastomotic ulcer  -EGD 2018 showed anastomotic ulcer  -On PPI, continue Protonix.  -Was taking NSAIDs PTA, also daily alcohol use  -Avoid NSAIDs    #.  S/p Kavon-en-Y gastric bypass-has been noncompliant with supplements    #.  Diabetes mellitus type 2, without long-term use of insulin, uncontrolled with most recent hemoglobin A1c 10.4, with hyperglycemia on admission, blood sugar over 400  -Resume Trulicity   -On Lantus + NovoLog carb coverage with meals and sliding scale  -Blood sugars improved, now less than 200  - continue lantus to 20 units daily, continue novolog 2 units per carb tid with meals  - continue sliding scale and continue to adjust insulin accordingly    #.  Alcohol use disorder   -Longstanding history of alcohol use.  Used to drink heavily until 2009, then quit for 12 years after inpatient treatment.  -Relapsed few months ago and now drinks 2-3 drinks per day.  She is not interested in quitting alcohol  -no signs of alcohol withdrawal, CIWA protocol discontinued  - completed 5 course of thiamine    #.  Alcoholic hepatitis -has elevated AST and ultrasound with fatty infiltration    #.  Distended gallbladder on ultrasound, without evidence of acute cholecystitis-  -No right upper quadrant pain or tenderness but has distended gallbladder with mild thickening and mildly dilated CBD.  -GI has recommended outpatient right upper quadrant ultrasound.  Unable to perform EUS due to altered anatomy from previous gastric bypass surgery    #.  New diagnosis of diastolic CHF exacerbation with preserved EF of 55-55%   -No acute exacerbation   -Echo also showed LVH, grade 1 diastolic dysfunction, increased PA pressure 45 mmHg    -Outpatient sleep study recommended for suspected RANI   -Continue Lasix 40 mg daily which is her PTA medicine     #.  Essential hypertension-  -Blood pressure mildly elevated.  Losartan increased to 50 mg daily, continue   -Also started on Toprol-XL 25 mg, continue    #.  Vitamin D deficiency-recent vitamin D low at 8  -Continue ergocalciferol 50,000 units weekly, started on 1/27    #.  Hyperlipidemia-recent lipid panel showed , HDL 84, triglycerides 106    #. Chronic major depression -   - reports chronic hopelessness. No suicidal ideation   - on wellbutrin, will continue        Diet: Diet  Consistent Carbohydrate Diet Low Consistent Carb (45 g CHO per Meal) Diet    DVT Prophylaxis: Aspirin, SCDs  Stanford Catheter: Not present  Central Lines: None  Cardiac Monitoring: None  Code Status: No CPR- Do NOT Intubate      Disposition Plan   Expected Discharge: 02/01/2022     Anticipated discharge location: [facility]    Delays:     Placement - TCU            The patient's care was discussed with the Patient     Glenna Serna MD  Hospitalist Service  [facility]  Securely message with the Vocera Web Console (learn more here)  Text page via [employer] Paging/Directory         Clinically Significant Risk Factors Present on Admission                   ______________________________________________________________________    Interval History    Patient reports ongoing pain in the left lower extremity   Pain pain right leg with hip fracture is controlled   Has difficulty ambulating due to left lower extremity pain   Denies dizziness, chest pain or shortness of breath  No nausea or vomiting   no abdominal pain  MRI of left knee showed likely complete lateral meniscus tear and severe osteoarthritis      Data reviewed today: I reviewed all medications, new labs and imaging results over the last 24 hours.     MRI left knee 1/28/22-  1.  End-stage degenerative arthritis in the lateral  compartment, including myxoid degeneration and a high-grade radial tear of the lateral meniscus (likely complete), and broad areas of full-thickness cartilage loss over the femoral condyle and tibial plateau. Large lateral femoral condyle and tibial plateau osteophytes are present.     2.  Small knee joint effusion, and moderate-sized popliteal cyst. No loose intra-articular bodies or significant synovitis visualized.     3.  Small area of grade III chondromalacia in the medial compartment of the femoral condyle. The medial meniscus is intact and is normal.     4.  Patellofemoral cartilage is intact, without significant arthritic changes.     5.  Ligaments and myotendinous structures are intact, without tearing or acute strain.     6.  Moderate generalized atrophy of the musculature in the left knee, suggesting chronic disuse or denervation change.     7.  Moderate nonspecific subcutaneous edema in the left knee. No focal fluid collection.    MRI lumbar spine 1/26/22-   1.  Mild to moderate multilevel lumbar spondylosis.  2.  Mild narrowing of the lateral recesses at several levels without high-grade central spinal canal stenosis.  3.  Moderate right neural foraminal stenosis at L1-L2. Mild right neural foraminal stenosis at L4-L5.  4.  Chronic superior endplate compression deformities of L1 and L5.    US left leg 1/26/22- 1. No evidence of deep venous thrombosis in the left lower extremity.  2. Left Baker's cyst measuring 4.1 x 2.8 x 0.4 cm with surrounding significant edema. May represent a ruptured Baker's cyst.    Xray right knee 1/25/22-   1.  Right total knee arthroplasty with patellar resurfacing. The components are well seated without evidence of complication. Small joint effusion.  2.  ORIF healed distal femur fracture with lateral plate and screw fixation.  3.  Bone demineralization.  4.  Atherosclerotic calcification.    Xray right femur 1/25/22-   1.  ORIF right proximal femur fracture with lateral  plate and femoral neck screw fixation. The orthopedic hardware is intact and the fracture is reduced. The femoral neck screw is within the lateral margin of the femoral neck.  2.  Plate and screw fixation of a healed distal femur fracture with lateral plate and screw fixation. Total knee arthroplasty.  3.  Mild right hip and sacroiliac degenerative arthrosis.  4.  Skin staples in the lateral thigh.    US abdomen 1/23/22-   1.  Mild extrahepatic bile duct dilatation and borderline pancreatic ductal dilatation of uncertain etiology.   2.  Distended gallbladder without definite cholecystitis. Somewhat unusual focal thickening and/or calcification in the posterior gallbladder wall of uncertain etiology.   3.  Extensive fatty infiltration of the liver.     xray pelvis and hip 1/23/22-                                       IMPRESSION: Acute mildly impacted basicervical fracture of the right femur. Questionable nondisplaced fracture of the right superior pubic ramus. No dislocation. Bones are demineralized. Right femoral fixation plate and screws incompletely imaged.   Extensive peripheral arterial calcifications.    Physical Exam   Vital Signs: Temp: 98.3  F (36.8  C) Temp src: Oral BP: (!) 146/74 Pulse: 75   Resp: 16 SpO2: 97 % O2 Device: None (Room air)    Weight: 213 lbs 13.54 oz    Constitutional: Awake, alert, cooperative, no apparent distress  Respiratory: Clear to auscultation bilaterally, no crackles or wheezing  Cardiovascular: Regular rate and rhythm, normal S1 and S2, and no murmur noted  GI: Normal bowel sounds, soft, non-distended, non-tender  Skin/Integumen: No rashes, no cyanosis, trace LE edema  MSK: R hip with lateral incision, clean moderate swelling throughout, tenderness in left leg with calf squeezing    Data   Recent Labs   Lab 01/29/22  1132 01/29/22  0849 01/29/22  0747 01/28/22  2132 01/28/22  1255 01/28/22  0905 01/25/22  1140 01/25/22  0924 01/24/22  0908 01/24/22  0845 01/23/22  0433  01/23/22  0051 01/23/22  0034   WBC  --   --   --   --   --   --   --   --   --   --   --   --  7.4   HGB  --   --   --   --   --  8.8*  --  8.4*  --  8.4*   < >  --  7.9*   MCV  --   --   --   --   --   --   --   --   --   --   --   --  68*   PLT  --   --   --   --   --   --   --   --   --   --   --   --  265   INR  --   --   --   --   --   --   --   --   --   --   --  1.01  --    NA  --   --   --   --   --  133  --  132*  --  135  --   --  135   POTASSIUM  --   --  3.7  --   --  3.6  --  3.6  --  3.5   < >  --  3.6   CHLORIDE  --   --   --   --   --  97  --  99  --  103  --   --  101   CO2  --   --   --   --   --  31  --  29  --  26  --   --  26   BUN  --   --   --   --   --  15  --  18  --  16  --   --  11   CR  --   --   --   --   --  0.80  --  0.95  --  1.00  --   --  0.78   ANIONGAP  --   --   --   --   --  5  --  4  --  6  --   --  8   GABRIEL  --   --   --   --   --  8.9  --  8.2*  --  8.1*  --   --  8.2*   * 162*  --  90   < > 194*   < > 293*   < > 229*   < >  --  411*   ALBUMIN  --   --   --   --   --   --   --  2.7*  --  3.0*  --   --  3.3*   PROTTOTAL  --   --   --   --   --   --   --  6.8  --  7.0  --   --  7.0   BILITOTAL  --   --   --   --   --   --   --  0.7  --  0.6  --   --  0.4   ALKPHOS  --   --   --   --   --   --   --  144  --  121  --   --  115   ALT  --   --   --   --   --   --   --  46  --  38  --   --  51*   AST  --   --   --   --   --   --   --  87*  --  55*  --   --  91*    < > = values in this interval not displayed.     Medications       aspirin  325 mg Oral Daily     buPROPion  200 mg Oral BID     cyclobenzaprine  10 mg Oral TID     folic acid  1 mg Oral Daily     furosemide  40 mg Oral Daily     insulin aspart   Subcutaneous Daily with breakfast     insulin aspart   Subcutaneous Daily with lunch     insulin aspart   Subcutaneous Daily with supper     insulin aspart  1-10 Units Subcutaneous TID AC     insulin aspart  1-7 Units Subcutaneous At Bedtime     insulin glargine  20 Units  Subcutaneous QAM AC     lidocaine (PF)  4 mL Other Once     losartan  50 mg Oral Daily     metoprolol succinate ER  25 mg Oral Daily     multivitamin w/minerals  1 tablet Oral Daily     pantoprazole  40 mg Oral BID AC     polyethylene glycol  17 g Oral Daily     senna-docusate  1 tablet Oral BID    Or     senna-docusate  2 tablet Oral BID     triamcinolone acetonide  20 mg INTRA-ARTICULAR Once     vitamin D2  50,000 Units Oral Q7 Days

## 2022-01-30 LAB
GLUCOSE BLDC GLUCOMTR-MCNC: 121 MG/DL (ref 70–99)
GLUCOSE BLDC GLUCOMTR-MCNC: 124 MG/DL (ref 70–99)
GLUCOSE BLDC GLUCOMTR-MCNC: 131 MG/DL (ref 70–99)
GLUCOSE BLDC GLUCOMTR-MCNC: 287 MG/DL (ref 70–99)
GLUCOSE BLDC GLUCOMTR-MCNC: 287 MG/DL (ref 70–99)
SARS-COV-2 RNA RESP QL NAA+PROBE: NEGATIVE

## 2022-01-30 PROCEDURE — 250N000011 HC RX IP 250 OP 636: Performed by: PHYSICIAN ASSISTANT

## 2022-01-30 PROCEDURE — 250N000009 HC RX 250: Performed by: PHYSICIAN ASSISTANT

## 2022-01-30 PROCEDURE — 250N000013 HC RX MED GY IP 250 OP 250 PS 637: Performed by: PHYSICIAN ASSISTANT

## 2022-01-30 PROCEDURE — 99232 SBSQ HOSP IP/OBS MODERATE 35: CPT | Performed by: INTERNAL MEDICINE

## 2022-01-30 PROCEDURE — 250N000013 HC RX MED GY IP 250 OP 250 PS 637: Performed by: INTERNAL MEDICINE

## 2022-01-30 PROCEDURE — 120N000001 HC R&B MED SURG/OB

## 2022-01-30 PROCEDURE — 250N000013 HC RX MED GY IP 250 OP 250 PS 637: Performed by: STUDENT IN AN ORGANIZED HEALTH CARE EDUCATION/TRAINING PROGRAM

## 2022-01-30 PROCEDURE — 87635 SARS-COV-2 COVID-19 AMP PRB: CPT | Performed by: INTERNAL MEDICINE

## 2022-01-30 RX ADMIN — SENNOSIDES AND DOCUSATE SODIUM 1 TABLET: 50; 8.6 TABLET ORAL at 21:39

## 2022-01-30 RX ADMIN — SENNOSIDES AND DOCUSATE SODIUM 1 TABLET: 50; 8.6 TABLET ORAL at 08:08

## 2022-01-30 RX ADMIN — HYDROMORPHONE HYDROCHLORIDE 4 MG: 2 TABLET ORAL at 08:09

## 2022-01-30 RX ADMIN — Medication 1 DROP: at 22:40

## 2022-01-30 RX ADMIN — POLYETHYLENE GLYCOL 3350 17 G: 17 POWDER, FOR SOLUTION ORAL at 08:08

## 2022-01-30 RX ADMIN — CYCLOBENZAPRINE 10 MG: 10 TABLET, FILM COATED ORAL at 17:46

## 2022-01-30 RX ADMIN — HYDROXYZINE HYDROCHLORIDE 25 MG: 25 TABLET ORAL at 04:30

## 2022-01-30 RX ADMIN — ASPIRIN 325 MG: 325 TABLET, DELAYED RELEASE ORAL at 08:08

## 2022-01-30 RX ADMIN — HYDROXYZINE HYDROCHLORIDE 25 MG: 25 TABLET ORAL at 14:25

## 2022-01-30 RX ADMIN — PANTOPRAZOLE SODIUM 40 MG: 40 TABLET, DELAYED RELEASE ORAL at 06:23

## 2022-01-30 RX ADMIN — TRIAMCINOLONE ACETONIDE 20 MG: 10 INJECTION, SUSPENSION INTRA-ARTICULAR; INTRALESIONAL at 10:16

## 2022-01-30 RX ADMIN — LIDOCAINE HYDROCHLORIDE 4 ML: 20 INJECTION, SOLUTION EPIDURAL; INFILTRATION; INTRACAUDAL; PERINEURAL at 10:17

## 2022-01-30 RX ADMIN — CYCLOBENZAPRINE 10 MG: 10 TABLET, FILM COATED ORAL at 21:39

## 2022-01-30 RX ADMIN — BUPROPION HYDROCHLORIDE 200 MG: 200 TABLET, EXTENDED RELEASE ORAL at 08:10

## 2022-01-30 RX ADMIN — HYDROMORPHONE HYDROCHLORIDE 4 MG: 2 TABLET ORAL at 21:39

## 2022-01-30 RX ADMIN — HYDROMORPHONE HYDROCHLORIDE 4 MG: 2 TABLET ORAL at 04:06

## 2022-01-30 RX ADMIN — TRAZODONE HYDROCHLORIDE 100 MG: 100 TABLET ORAL at 22:40

## 2022-01-30 RX ADMIN — BUPROPION HYDROCHLORIDE 200 MG: 200 TABLET, EXTENDED RELEASE ORAL at 17:46

## 2022-01-30 RX ADMIN — FUROSEMIDE 40 MG: 40 TABLET ORAL at 08:10

## 2022-01-30 RX ADMIN — METOPROLOL SUCCINATE 25 MG: 25 TABLET, EXTENDED RELEASE ORAL at 08:09

## 2022-01-30 RX ADMIN — CYCLOBENZAPRINE 10 MG: 10 TABLET, FILM COATED ORAL at 08:09

## 2022-01-30 RX ADMIN — HYDROMORPHONE HYDROCHLORIDE 4 MG: 2 TABLET ORAL at 14:25

## 2022-01-30 RX ADMIN — PANTOPRAZOLE SODIUM 40 MG: 40 TABLET, DELAYED RELEASE ORAL at 17:46

## 2022-01-30 RX ADMIN — LOSARTAN POTASSIUM 50 MG: 50 TABLET, FILM COATED ORAL at 08:08

## 2022-01-30 ASSESSMENT — ACTIVITIES OF DAILY LIVING (ADL)
ADLS_ACUITY_SCORE: 15

## 2022-01-30 NOTE — PLAN OF CARE
VSS, CMS intact. Up with 2 and lift. Pain managed with dilaudid, atarax and Flexeril. Received corticosteroid injection to left knee. Purewick in place for incontinent. A&OX4. Possible discharge to TCU tomorrow.

## 2022-01-30 NOTE — PROGRESS NOTES
[facility]  Progress Note    2320    Visit per LOS.    Lamar declined the visit.        Mraitza Love    Pager [phone]

## 2022-01-30 NOTE — PROGRESS NOTES
[facility]    Medicine Progress Note - Hospitalist Service    Date of Admission:  1/23/2022    Assessment & Plan          Lamar Menendez is a pleasant 61-year-old female with hypertension, diabetes mellitus type 2, alcohol use disorder, previous gastric bypass surgery, previous esophagitis and anastomotic ulcer, iron deficiency anemia, depression/anxiety, PTSD who was brought in due to right femoral neck fracture secondary to mechanical fall at home.  She had 2 falls at home, first she slipped on a rug and was seen at outside hospital.  Work-up was negative for fracture.  When she returned home, her right leg gave out with weightbearing resulting in second fall.  She was then brought to [facility] and found to have right femur basicervical fracture.  She underwent ORIF on 1/22/2022.  Postoperatively she complained of significant pain again requested.  Left posterior thigh pain and cough.  MRI of knee showed complete meniscal tear and arthritis of left knee.  She received steroid injection to the left knee on 1/30/2022.  She has not been able to get up or ambulate independently so far.    #.  Acute, mildly impacted, basicervical fracture of right femur secondary to mechanical fall, s/p open reduction and internal fixation using hip screw, 1/23/2022  -Postop day 7  -Doing well.  Pain is controlled.    -Needs 2 person assist or lift, mainly due to left lower extremity pain  -Management as per orthopedics.  -Weightbearing status, DVT prophylaxis, PT/OT as per orthopedics  -Will need TCU    #.  Complete lateral meniscal tear of left knee   -Report significant left calf and left posterior thigh pain since her fall.  Unable to bear weight during.  No fractures   - US negative for DVT but showed likely ruptured baker's cyst.   -MRI of the knee showed high-grade likely complete lateral meniscal tear, full-thickness cartilage loss over femoral condyle and tibial plateau  -Received  corticosteroid injection to left knee on 1/30/2022  - continue PT and pain control with tylenol, prn Dilaudid   -Management as per orthopedics    #.  Gradually worsening chronic iron deficiency anemia  -Presented with hemoglobin of 7.9.  Received 1 unit PRBC perioperatively.  Received IV iron at outside hospital prior to transfer  -Hemoglobin 8.4 and stable  -Records reviewed.  Hemoglobin was 11.1 a year ago and since then has gradually been trending down, was 9.7 on 1/13/2022.  This is most likely secondary to iron deficiency given her history of gastric bypass surgery.  -Recent labs showed ferritin of 13, normal vitamin B12   -Assessed by GI-does not appear to have any GI bleed.  EGD not recommended    #.  Previous esophagitis and anastomotic ulcer  -EGD 2018 showed anastomotic ulcer  -On PPI, continue Protonix.  -Was taking NSAIDs PTA, also daily alcohol use  -Avoid NSAIDs    #.  S/p Kavon-en-Y gastric bypass-has been noncompliant with supplements    #.  Diabetes mellitus type 2, without long-term use of insulin, uncontrolled with most recent hemoglobin A1c 10.4, with hyperglycemia on admission, blood sugar over 400  -Resume Trulicity   -On Lantus + NovoLog carb coverage with meals and sliding scale  -Blood sugars are well controlled  - continue lantus to 20 units daily, continue novolog 2 units per carb tid with meals  - continue sliding scale and continue to adjust insulin accordingly    #.  Alcohol use disorder   -Longstanding history of alcohol use.  Used to drink heavily until 2009, then quit for 12 years after inpatient treatment.  -Relapsed few months ago and now drinks 2-3 drinks per day.  She is not interested in quitting alcohol  -no signs of alcohol withdrawal, [facility] protocol discontinued  - completed 5 course of thiamine    #.  Alcoholic hepatitis -has elevated AST and ultrasound with fatty infiltration    #.  Distended gallbladder on ultrasound, without evidence of acute cholecystitis-  -No right upper  quadrant pain or tenderness but has distended gallbladder with mild thickening and mildly dilated CBD.  -GI has recommended outpatient right upper quadrant ultrasound.  Unable to perform EUS due to altered anatomy from previous gastric bypass surgery    #.  New diagnosis of diastolic CHF exacerbation with preserved EF of 55-55%   -No acute exacerbation   -Echo also showed LVH, grade 1 diastolic dysfunction, increased PA pressure 45 mmHg   -Outpatient sleep study recommended for suspected RANI   -Continue Lasix 40 mg daily which is her PTA medicine     #.  Essential hypertension-  -Blood pressure mildly elevated.  Losartan increased to 50 mg daily, continue   -Also started on Toprol-XL 25 mg, continue    #.  Vitamin D deficiency-recent vitamin D low at 8  -Continue ergocalciferol 50,000 units weekly, started on 1/27    #.  Hyperlipidemia-recent lipid panel showed , HDL 84, triglycerides 106    #. Chronic major depression -   - reports chronic hopelessness. No suicidal ideation   - on wellbutrin, will continue        Diet: Diet  Consistent Carbohydrate Diet Low Consistent Carb (45 g CHO per Meal) Diet    DVT Prophylaxis: Aspirin, SCDs  Stanford Catheter: Not present  Central Lines: None  Cardiac Monitoring: None  Code Status: No CPR- Do NOT Intubate      Disposition Plan   Expected Discharge: 02/01/2022     Anticipated discharge location: long-term [facility] facility    Delays:     Placement - TCU            The patient's care was discussed with the Patient     Glenna Serna MD  Hospitalist Service  [facility]  Securely message with the Vocera Web Console (learn more here)  Text page via [email or web address] Paging/Directory         Clinically Significant Risk Factors Present on Admission                   ______________________________________________________________________    Interval History    Complains of increased drainage from the right hip wound.  No increasing pain..  Skin surrounding the wound  appears well without evidence of infection  Received corticosteroid injection to the left knee today  Continues to complain of significant pain and spasm in both legs but worse in left leg  Still unable to ambulate or stand up on her own.  Needing 2 person assist on her left  Denies dizziness, chest pain or shortness of breath  No nausea or vomiting   no abdominal pain      Data reviewed today: I reviewed all medications, new labs and imaging results over the last 24 hours.     MRI left knee 1/28/22- 1.  End-stage degenerative arthritis in the lateral compartment, including myxoid degeneration and a high-grade radial tear of the lateral meniscus (likely complete), and broad areas of full-thickness cartilage loss over the femoral condyle and tibial plateau. Large lateral femoral condyle and tibial plateau osteophytes are present.     2.  Small knee joint effusion, and moderate-sized popliteal cyst. No loose intra-articular bodies or significant synovitis visualized.     3.  Small area of grade III chondromalacia in the medial compartment of the femoral condyle. The medial meniscus is intact and is normal.     4.  Patellofemoral cartilage is intact, without significant arthritic changes.     5.  Ligaments and myotendinous structures are intact, without tearing or acute strain.     6.  Moderate generalized atrophy of the musculature in the left knee, suggesting chronic disuse or denervation change.     7.  Moderate nonspecific subcutaneous edema in the left knee. No focal fluid collection.    MRI lumbar spine 1/26/22- 1.  Mild to moderate multilevel lumbar spondylosis.  2.  Mild narrowing of the lateral recesses at several levels without high-grade central spinal canal stenosis.  3.  Moderate right neural foraminal stenosis at L1-L2. Mild right neural foraminal stenosis at L4-L5.  4.  Chronic superior endplate compression deformities of L1 and L5.    US left leg 1/26/22- 1. No evidence of deep venous thrombosis in  the left lower extremity.  2. Left Baker's cyst measuring 4.1 x 2.8 x 0.4 cm with surrounding significant edema. May represent a ruptured Baker's cyst.    Xray right knee 1/25/22-   1.  Right total knee arthroplasty with patellar resurfacing. The components are well seated without evidence of complication. Small joint effusion.  2.  ORIF healed distal femur fracture with lateral plate and screw fixation.  3.  Bone demineralization.  4.  Atherosclerotic calcification.    Xray right femur 1/25/22-   1.  ORIF right proximal femur fracture with lateral plate and femoral neck screw fixation. The orthopedic hardware is intact and the fracture is reduced. The femoral neck screw is within the lateral margin of the femoral neck.  2.  Plate and screw fixation of a healed distal femur fracture with lateral plate and screw fixation. Total knee arthroplasty.  3.  Mild right hip and sacroiliac degenerative arthrosis.  4.  Skin staples in the lateral thigh.    US abdomen 1/23/22-   1.  Mild extrahepatic bile duct dilatation and borderline pancreatic ductal dilatation of uncertain etiology.   2.  Distended gallbladder without definite cholecystitis. Somewhat unusual focal thickening and/or calcification in the posterior gallbladder wall of uncertain etiology.   3.  Extensive fatty infiltration of the liver.     xray pelvis and hip 1/23/22-                                       IMPRESSION: Acute mildly impacted basicervical fracture of the right femur. Questionable nondisplaced fracture of the right superior pubic ramus. No dislocation. Bones are demineralized. Right femoral fixation plate and screws incompletely imaged.   Extensive peripheral arterial calcifications.    Physical Exam   Vital Signs: Temp: 97.9  F (36.6  C) Temp src: Oral BP: (!) 148/73 Pulse: 74   Resp: 18 SpO2: 95 % O2 Device: None (Room air)    Weight: 213 lbs 13.54 oz    Constitutional: Awake, alert, cooperative, no apparent distress  Respiratory: Clear to  auscultation bilaterally, no crackles or wheezing  Cardiovascular: Regular rate and rhythm, normal S1 and S2, and no murmur noted  GI: Normal bowel sounds, soft, non-distended, non-tender  Skin/Integumen: No rashes, no cyanosis, trace LE edema  MSK: R hip with lateral incision, clean moderate swelling throughout, tenderness in left leg with calf squeezing    Data   Recent Labs   Lab 01/30/22  1157 01/30/22  0809 01/30/22  0208 01/29/22  0849 01/29/22  0747 01/28/22  1255 01/28/22  0905 01/25/22  1140 01/25/22  0924 01/24/22  0908 01/24/22  0845   HGB  --   --   --   --   --   --  8.8*  --  8.4*  --  8.4*   NA  --   --   --   --   --   --  133  --  132*  --  135   POTASSIUM  --   --   --   --  3.7  --  3.6  --  3.6  --  3.5   CHLORIDE  --   --   --   --   --   --  97  --  99  --  103   CO2  --   --   --   --   --   --  31  --  29  --  26   BUN  --   --   --   --   --   --  15  --  18  --  16   CR  --   --   --   --   --   --  0.80  --  0.95  --  1.00   ANIONGAP  --   --   --   --   --   --  5  --  4  --  6   GABRIEL  --   --   --   --   --   --  8.9  --  8.2*  --  8.1*   * 131* 124*   < >  --    < > 194*   < > 293*   < > 229*   ALBUMIN  --   --   --   --   --   --   --   --  2.7*  --  3.0*   PROTTOTAL  --   --   --   --   --   --   --   --  6.8  --  7.0   BILITOTAL  --   --   --   --   --   --   --   --  0.7  --  0.6   ALKPHOS  --   --   --   --   --   --   --   --  144  --  121   ALT  --   --   --   --   --   --   --   --  46  --  38   AST  --   --   --   --   --   --   --   --  87*  --  55*    < > = values in this interval not displayed.     Medications       aspirin  325 mg Oral Daily     buPROPion  200 mg Oral BID     cyclobenzaprine  10 mg Oral TID     folic acid  1 mg Oral Daily     furosemide  40 mg Oral Daily     insulin aspart   Subcutaneous Daily with breakfast     insulin aspart   Subcutaneous Daily with lunch     insulin aspart   Subcutaneous Daily with supper     insulin aspart  1-10 Units  Subcutaneous TID AC     insulin aspart  1-7 Units Subcutaneous At Bedtime     insulin glargine  20 Units Subcutaneous QAM AC     losartan  50 mg Oral Daily     metoprolol succinate ER  25 mg Oral Daily     multivitamin w/minerals  1 tablet Oral Daily     pantoprazole  40 mg Oral BID AC     polyethylene glycol  17 g Oral Daily     senna-docusate  1 tablet Oral BID    Or     senna-docusate  2 tablet Oral BID     vitamin D2  50,000 Units Oral Q7 Days

## 2022-01-30 NOTE — PROGRESS NOTES
Left knee intra-articular joint injection.       RN at bedside, consent was signed, discussion was had with patient. Risks, benefits and complications were discussed with the patient. She was in agreement with the plan for left knee intra-articular cortisone injection today.     The left knee was cleaned with chloroprep. 3ml lidocaine and 2ml triamcinolone acetonide was injected into the knee with a 22 gauge needle. The injection was completed without any complications.

## 2022-01-30 NOTE — PROGRESS NOTES
Patient A&OX4, VSS on RA. Carb diet. BG check. Incontinent of urine , purewick  In place. Pain managed with Flexeril, Atarax & po dilaudid. CMS intact. Dressing moderately moist . Pending Discharge insurance authorization .

## 2022-01-30 NOTE — PROGRESS NOTES
"Lamar Menendez  2022  POD # 7, ORIF right hip, left knee lateral meniscus tear with moderate arthritis     Patient resting comfortably in her chair.    Pain controlled at rest. When WB with PT has left knee pain but denies any ivett instability. She describes this pain as more of a burning sensation at times. Denies any low back pain or pain into left hip.   Tolerating oral intake.    Denies nausea or vomiting  Denies chest pain or shortness of breath  No events overnight.   Blood pressure (!) 148/73, pulse 74, temperature 97.9  F (36.6  C), temperature source Oral, resp. rate 16, height 1.626 m (5' 4\"), weight 97 kg (213 lb 13.5 oz), SpO2 95 %.  Temperatures:  Current - Temp: 97.9  F (36.6  C); Max - Temp  Av.1  F (36.7  C)  Min: 97.9  F (36.6  C)  Max: 98.3  F (36.8  C)  Pulse range: Pulse  Av  Min: 73  Max: 75  Blood pressure range: Systolic (24hrs), Av , Min:148 , Max:158   ; Diastolic (24hrs), Av, Min:70, Max:73    CMS: Dressing is clean, dry, and intact.   Minimal erythema of the surrounding skin.   Bilateral calves are soft, non-tender.  Bilateral lower extremity is NVI.  Sensation intact bilateral lower extremities  5/5 motor with resisted dorsi and plantar flexion bilaterally  +Dp pulse     Left knee without effusion  Mild lateral joint line tenderness today   Posterior calf painful with WB, however it is soft and non tender. No redness on left calf.   No ecchymosis or erythema    Labs:  Hemoglobin   Date Value Ref Range Status   2022 8.8 (L) 11.7 - 15.7 g/dL Final   ]  Lab Results   Component Value Date    INR 1.01 2022     Lab Results   Component Value Date     2022       PLAN:  1. S/p right femur fracture DHS              Continue ASA for DVT prophylaxis.                Mobilize with PT/OT               50% WB right LE              Leave dressing intact unless saturated > 60%               Continue current pain regiment.              Continue symptomatic " support             left knee intra articular CSI was completed today with RN at bedside without any complications      2. Disposition              Anticipate d/c to TCU based on placement.    Shalini Roman PA-C

## 2022-01-31 ENCOUNTER — APPOINTMENT (OUTPATIENT)
Dept: PHYSICAL THERAPY | Facility: CLINIC | Age: 62
DRG: 481 | End: 2022-01-31
Payer: COMMERCIAL

## 2022-01-31 LAB
GLUCOSE BLDC GLUCOMTR-MCNC: 205 MG/DL (ref 70–99)
GLUCOSE BLDC GLUCOMTR-MCNC: 223 MG/DL (ref 70–99)
GLUCOSE BLDC GLUCOMTR-MCNC: 236 MG/DL (ref 70–99)
GLUCOSE BLDC GLUCOMTR-MCNC: 247 MG/DL (ref 70–99)
GLUCOSE BLDC GLUCOMTR-MCNC: 254 MG/DL (ref 70–99)
GLUCOSE BLDC GLUCOMTR-MCNC: 255 MG/DL (ref 70–99)

## 2022-01-31 PROCEDURE — 99232 SBSQ HOSP IP/OBS MODERATE 35: CPT | Performed by: HOSPITALIST

## 2022-01-31 PROCEDURE — 250N000013 HC RX MED GY IP 250 OP 250 PS 637: Performed by: PHYSICIAN ASSISTANT

## 2022-01-31 PROCEDURE — 250N000013 HC RX MED GY IP 250 OP 250 PS 637: Performed by: INTERNAL MEDICINE

## 2022-01-31 PROCEDURE — 250N000013 HC RX MED GY IP 250 OP 250 PS 637: Performed by: STUDENT IN AN ORGANIZED HEALTH CARE EDUCATION/TRAINING PROGRAM

## 2022-01-31 PROCEDURE — 120N000001 HC R&B MED SURG/OB

## 2022-01-31 PROCEDURE — 97110 THERAPEUTIC EXERCISES: CPT | Mod: GP

## 2022-01-31 PROCEDURE — 97530 THERAPEUTIC ACTIVITIES: CPT | Mod: GP

## 2022-01-31 RX ADMIN — CYCLOBENZAPRINE 10 MG: 10 TABLET, FILM COATED ORAL at 16:12

## 2022-01-31 RX ADMIN — HYDROMORPHONE HYDROCHLORIDE 4 MG: 2 TABLET ORAL at 00:40

## 2022-01-31 RX ADMIN — HYDROMORPHONE HYDROCHLORIDE 4 MG: 2 TABLET ORAL at 10:24

## 2022-01-31 RX ADMIN — HYDROMORPHONE HYDROCHLORIDE 4 MG: 2 TABLET ORAL at 14:17

## 2022-01-31 RX ADMIN — BUPROPION HYDROCHLORIDE 200 MG: 200 TABLET, EXTENDED RELEASE ORAL at 10:24

## 2022-01-31 RX ADMIN — HYDROXYZINE HYDROCHLORIDE 25 MG: 25 TABLET ORAL at 21:56

## 2022-01-31 RX ADMIN — PANTOPRAZOLE SODIUM 40 MG: 40 TABLET, DELAYED RELEASE ORAL at 06:40

## 2022-01-31 RX ADMIN — POLYETHYLENE GLYCOL 3350 17 G: 17 POWDER, FOR SOLUTION ORAL at 10:23

## 2022-01-31 RX ADMIN — FOLIC ACID 1 MG: 1 TABLET ORAL at 10:24

## 2022-01-31 RX ADMIN — ASPIRIN 325 MG: 325 TABLET, DELAYED RELEASE ORAL at 10:24

## 2022-01-31 RX ADMIN — SENNOSIDES AND DOCUSATE SODIUM 1 TABLET: 50; 8.6 TABLET ORAL at 10:24

## 2022-01-31 RX ADMIN — ACETAMINOPHEN 650 MG: 325 TABLET, FILM COATED ORAL at 16:12

## 2022-01-31 RX ADMIN — TRAZODONE HYDROCHLORIDE 100 MG: 100 TABLET ORAL at 21:56

## 2022-01-31 RX ADMIN — BUPROPION HYDROCHLORIDE 200 MG: 200 TABLET, EXTENDED RELEASE ORAL at 16:12

## 2022-01-31 RX ADMIN — HYDROXYZINE HYDROCHLORIDE 25 MG: 25 TABLET ORAL at 14:17

## 2022-01-31 RX ADMIN — SENNOSIDES AND DOCUSATE SODIUM 1 TABLET: 50; 8.6 TABLET ORAL at 21:56

## 2022-01-31 RX ADMIN — HYDROMORPHONE HYDROCHLORIDE 4 MG: 2 TABLET ORAL at 23:13

## 2022-01-31 RX ADMIN — HYDROMORPHONE HYDROCHLORIDE 4 MG: 2 TABLET ORAL at 18:48

## 2022-01-31 RX ADMIN — CYCLOBENZAPRINE 10 MG: 10 TABLET, FILM COATED ORAL at 10:24

## 2022-01-31 RX ADMIN — PANTOPRAZOLE SODIUM 40 MG: 40 TABLET, DELAYED RELEASE ORAL at 16:12

## 2022-01-31 RX ADMIN — HYDROXYZINE HYDROCHLORIDE 25 MG: 25 TABLET ORAL at 00:40

## 2022-01-31 RX ADMIN — LOSARTAN POTASSIUM 50 MG: 50 TABLET, FILM COATED ORAL at 10:24

## 2022-01-31 RX ADMIN — CYCLOBENZAPRINE 10 MG: 10 TABLET, FILM COATED ORAL at 21:56

## 2022-01-31 RX ADMIN — METOPROLOL SUCCINATE 25 MG: 25 TABLET, EXTENDED RELEASE ORAL at 10:24

## 2022-01-31 RX ADMIN — FUROSEMIDE 40 MG: 40 TABLET ORAL at 10:24

## 2022-01-31 ASSESSMENT — ACTIVITIES OF DAILY LIVING (ADL)
ADLS_ACUITY_SCORE: 11
ADLS_ACUITY_SCORE: 15
ADLS_ACUITY_SCORE: 15
ADLS_ACUITY_SCORE: 11
ADLS_ACUITY_SCORE: 15
ADLS_ACUITY_SCORE: 11
ADLS_ACUITY_SCORE: 11
ADLS_ACUITY_SCORE: 15
ADLS_ACUITY_SCORE: 11
ADLS_ACUITY_SCORE: 15

## 2022-01-31 NOTE — PROGRESS NOTES
[facility]    Medicine Progress Note - Hospitalist Service    Date of Admission:  1/23/2022    Assessment & Plan          Lamar Menendez is a pleasant 61-year-old female with hypertension, diabetes mellitus type 2, alcohol use disorder, previous gastric bypass surgery, previous esophagitis and anastomotic ulcer, iron deficiency anemia, depression/anxiety, PTSD who was brought in due to right femoral neck fracture secondary to mechanical fall at home.  She had 2 falls at home, first she slipped on a rug and was seen at outside hospital.  Work-up was negative for fracture.  When she returned home, her right leg gave out with weightbearing resulting in second fall.  She was then brought to [facility] and found to have right femur basicervical fracture.  She underwent ORIF on 1/22/2022.  Postoperatively she complained of significant pain again requested.  Left posterior thigh pain and cough.  MRI of knee showed complete meniscal tear and arthritis of left knee.  She received steroid injection to the left knee on 1/30/2022.  She has not been able to get up or ambulate independently so far.     #.  Acute, mildly impacted, basicervical fracture of right femur secondary to mechanical fall, s/p open reduction and internal fixation using hip screw, 1/23/2022  -Doing well.  Pain is controlled.    -Needs 2 person assist or lift, mainly due to left lower extremity pain  -Management as per orthopedics.  -Weightbearing status, DVT prophylaxis, PT/OT as per orthopedics  -Will need TCU     #.  Complete lateral meniscal tear of left knee   -Report significant left calf and left posterior thigh pain since her fall.  Unable to bear weight during.  No fractures   - US negative for DVT but showed likely ruptured baker's cyst.   -MRI of the knee showed high-grade likely complete lateral meniscal tear, full-thickness cartilage loss over femoral condyle and tibial plateau  -Received corticosteroid  injection to left knee on 1/30/2022  - continue PT and pain control with tylenol, prn Dilaudid   -Management as per orthopedics     #.  Gradually worsening chronic iron deficiency anemia  -Presented with hemoglobin of 7.9.  Received 1 unit PRBC perioperatively.  Received IV iron at outside hospital prior to transfer  -Hemoglobin 8.4 and stable  -Records reviewed.  Hemoglobin was 11.1 a year ago and since then has gradually been trending down, was 9.7 on 1/13/2022.  This is most likely secondary to iron deficiency given her history of gastric bypass surgery.  -Recent labs showed ferritin of 13, normal vitamin B12   -Assessed by GI-does not appear to have any GI bleed.  EGD not recommended     #.  Previous esophagitis and anastomotic ulcer  -EGD 2018 showed anastomotic ulcer  -On PPI, continue Protonix.  -Was taking NSAIDs PTA, also daily alcohol use  -Avoid NSAIDs     #.  S/p Kavon-en-Y gastric bypass-has been noncompliant with supplements     #.  Diabetes mellitus type 2, without long-term use of insulin, uncontrolled with most recent hemoglobin A1c 10.4, with hyperglycemia on admission, blood sugar over 400  -Resume Trulicity   -On Lantus + NovoLog carb coverage with meals and sliding scale  -Blood sugars are well controlled  - continue lantus to 20 units daily, continue novolog 2 units per carb tid with meals  - continue sliding scale and continue to adjust insulin accordingly     #.  Alcohol use disorder   -Longstanding history of alcohol use.  Used to drink heavily until 2009, then quit for 12 years after inpatient treatment.  -Relapsed few months ago and now drinks 2-3 drinks per day.  She is not interested in quitting alcohol  -no signs of alcohol withdrawal, [facility] protocol discontinued  - completed 5 course of thiamine     #.  Alcoholic hepatitis -has elevated AST and ultrasound with fatty infiltration     #.  Distended gallbladder on ultrasound, without evidence of acute cholecystitis-  -No right upper  quadrant pain or tenderness but has distended gallbladder with mild thickening and mildly dilated CBD.  -GI has recommended outpatient right upper quadrant ultrasound.  Unable to perform EUS due to altered anatomy from previous gastric bypass surgery     #.  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  Essential hypertension-  -Blood pressure mildly elevated.  Losartan increased to 50 mg daily, continue   -Also started on Toprol-XL 25 mg, continue     #.  Vitamin D deficiency-recent vitamin D low at 8  -Continue ergocalciferol 50,000 units weekly, started on 1/27     #.  Hyperlipidemia-recent lipid panel showed , HDL 84, triglycerides 106     #. Chronic major depression -   - reports chronic hopelessness. No suicidal ideation   - on wellbutrin, will continue             Diet: Diet  Consistent Carbohydrate Diet Low Consistent Carb (45 g CHO per Meal) Diet    DVT Prophylaxis: On aspirin per Ortho service  Stanford Catheter: Not present  Central Lines: None  Cardiac Monitoring: None  Code Status: No CPR- Do NOT Intubate      Disposition Plan   Expected Discharge: 02/01/2022     Anticipated discharge location: long-term care facility    Delays:     Placement - TCU  Insurance Authorization            The patient's care was discussed with the Patient.    Cassidy Gutierrez MD  Hospitalist Service  [facility]  Securely message with the Voc[employer] Web Console (learn more here)  Text page via [employer] Paging/Directory         Clinically Significant Risk Factors Present on Admission                    ______________________________________________________________________    Interval History   No complaints at this time.    Data reviewed today: I reviewed  all medications, new labs and imaging results over the last 24 hours. I personally reviewed no images or EKG's today.    Physical Exam   Vital Signs: Temp: 97.7  F (36.5  C) Temp src: Oral BP: (!) 146/71 Pulse: 71   Resp: 16 SpO2: 94 % O2 Device: None (Room air)    Weight: 213 lbs 13.54 oz  General Appearance: Well appearing for stated age.  Respiratory: CTAB, no rales or ronchi  Cardiovascular: S1, S2 normal, no murmurs  GI: non-tender on palpation, BS present      Data   Recent Labs   Lab 01/31/22  1218 01/31/22  1025 01/31/22  0634 01/29/22  0849 01/29/22  0747 01/28/22  1255 01/28/22  0905 01/25/22  1140 01/25/22  0924   HGB  --   --   --   --   --   --  8.8*  --  8.4*   NA  --   --   --   --   --   --  133  --  132*   POTASSIUM  --   --   --   --  3.7  --  3.6  --  3.6   CHLORIDE  --   --   --   --   --   --  97  --  99   CO2  --   --   --   --   --   --  31  --  29   BUN  --   --   --   --   --   --  15  --  18   CR  --   --   --   --   --   --  0.80  --  0.95   ANIONGAP  --   --   --   --   --   --  5  --  4   GABRIEL  --   --   --   --   --   --  8.9  --  8.2*   * 223* 247*   < >  --    < > 194*   < > 293*   ALBUMIN  --   --   --   --   --   --   --   --  2.7*   PROTTOTAL  --   --   --   --   --   --   --   --  6.8   BILITOTAL  --   --   --   --   --   --   --   --  0.7   ALKPHOS  --   --   --   --   --   --   --   --  144   ALT  --   --   --   --   --   --   --   --  46   AST  --   --   --   --   --   --   --   --  87*    < > = values in this interval not displayed.

## 2022-01-31 NOTE — PROGRESS NOTES
VSS on RA. CMS intact. Up with lift to BSC, L bowel movement. Good output in PW and incontinence. Pain managed with dilaudid, atarax and flexeril. BG checks, see labs. Did not give morning novolog as pt stated she wasn't going to eat breakfast till later today. Possible discharge to TCU today.

## 2022-01-31 NOTE — PROGRESS NOTES
Orthopedic Surgery  Lamar Menendez  2022  Admit Date:  2022  POD: 8 Days Post-Op   Procedure(s):  OPEN REDUCTION INTERNAL FIXATION RIGHT HIP    Alert and oriented.   Patient resting comfortably in bed.    She is uncertain if the cortisone injection has helped her left knee.    Tolerating oral intake.      Vital Sign Ranges  Temperature Temp  Av.1  F (36.7  C)  Min: 97.7  F (36.5  C)  Max: 98.4  F (36.9  C)   Blood pressure Systolic (24hrs), Av , Min:140 , Max:146        Diastolic (24hrs), Av, Min:71, Max:76      Pulse Pulse  Av.3  Min: 70  Max: 79   Respirations Resp  Av.4  Min: 12  Max: 16   Pulse oximetry SpO2  Av.7 %  Min: 92 %  Max: 94 %       Dressing is intact but 60% saturated.   Minimal erythema of the surrounding skin.   Bilateral calves are soft, non-tender.  Right lower extremity is NVI.  Sensation intact bilateral lower extremities  Patient able to resist dorsi and plantar flexion bilaterally  +Dp pulse    Labs:  Recent Labs   Lab Test 22  0034   WBC 7.4     Recent Labs   Lab Test 22  0905 22  0924 22  0845   HGB 8.8* 8.4* 8.4*     Recent Labs   Lab Test 22  0051   INR 1.01     Recent Labs   Lab Test 22  0034          1. PLAN:   Continue ASA for DVT prophylaxis.     Mobilize with PT/OT    50% WB Right LE, WBAT on Left.     Continue current pain regiment.   Dressings: Change today     2. Disposition   Anticipate d/c to TCU when medically cleared and progressing in PT.  Ortho stable.     Linn Cali PA-C

## 2022-01-31 NOTE — PROGRESS NOTES
Care Management Follow Up    Length of Stay (days): 8    Expected Discharge Date: 02/01/2022     Concerns to be Addressed: discharge planning     Patient plan of care discussed at interdisciplinary rounds: Yes    Anticipated Discharge Disposition: Transitional Care     Anticipated Discharge Services: None  Anticipated Discharge DME: None    Patient/family educated on Medicare website which has current facility and service quality ratings: no  Education Provided on the Discharge Plan:    Patient/Family in Agreement with the Plan: yes    Referrals Placed by CM/SW: Post Acute Facilities  Private pay costs discussed: Not applicable    Additional Information:  Call from [facility] at [facility] regarding bed. Writer confirmed that patient is still admitted and needs a bed. There is no confirmation of [employer] auth so she will check on it and get back to writer to plan for discharge when that is received.     Addendum: Writer confirmed that patient would prefer a semi private/shared room. Writer updated that [facility] has a bed and are waiting on insurance auth. She is in agreement. Updated facility bed preference.       CAROL Montana

## 2022-01-31 NOTE — PLAN OF CARE
A&Ox4, VSS, assist of 2 with cares and lift, pure wick in place, diet is regular. Pt is a diabetic with glucose monitoring, insulin, and carb count. PCD's on. CMS intact. PRN Dilaudid oral tabs to manage pain. Discharge to TCU once insurance authorizes.

## 2022-02-01 ENCOUNTER — APPOINTMENT (OUTPATIENT)
Dept: PHYSICAL THERAPY | Facility: CLINIC | Age: 62
DRG: 481 | End: 2022-02-01
Payer: COMMERCIAL

## 2022-02-01 LAB
GLUCOSE BLDC GLUCOMTR-MCNC: 153 MG/DL (ref 70–99)
GLUCOSE BLDC GLUCOMTR-MCNC: 154 MG/DL (ref 70–99)
GLUCOSE BLDC GLUCOMTR-MCNC: 182 MG/DL (ref 70–99)
GLUCOSE BLDC GLUCOMTR-MCNC: 191 MG/DL (ref 70–99)
GLUCOSE BLDC GLUCOMTR-MCNC: 249 MG/DL (ref 70–99)

## 2022-02-01 PROCEDURE — 120N000001 HC R&B MED SURG/OB

## 2022-02-01 PROCEDURE — 99232 SBSQ HOSP IP/OBS MODERATE 35: CPT | Performed by: HOSPITALIST

## 2022-02-01 PROCEDURE — 250N000013 HC RX MED GY IP 250 OP 250 PS 637: Performed by: PHYSICIAN ASSISTANT

## 2022-02-01 PROCEDURE — 97530 THERAPEUTIC ACTIVITIES: CPT | Mod: GP

## 2022-02-01 PROCEDURE — 250N000013 HC RX MED GY IP 250 OP 250 PS 637: Performed by: INTERNAL MEDICINE

## 2022-02-01 PROCEDURE — 250N000013 HC RX MED GY IP 250 OP 250 PS 637: Performed by: STUDENT IN AN ORGANIZED HEALTH CARE EDUCATION/TRAINING PROGRAM

## 2022-02-01 RX ORDER — PANTOPRAZOLE SODIUM 40 MG/1
40 TABLET, DELAYED RELEASE ORAL
COMMUNITY
Start: 2022-02-01

## 2022-02-01 RX ORDER — MULTIPLE VITAMINS W/ MINERALS TAB 9MG-400MCG
1 TAB ORAL DAILY
COMMUNITY
Start: 2022-02-02

## 2022-02-01 RX ORDER — METOPROLOL SUCCINATE 25 MG/1
50 TABLET, EXTENDED RELEASE ORAL DAILY
COMMUNITY
Start: 2022-02-02

## 2022-02-01 RX ORDER — CYCLOBENZAPRINE HCL 10 MG
10 TABLET ORAL 3 TIMES DAILY PRN
COMMUNITY
Start: 2022-02-01 | End: 2022-02-21

## 2022-02-01 RX ORDER — LOSARTAN POTASSIUM 50 MG/1
50 TABLET ORAL DAILY
COMMUNITY
Start: 2022-02-02

## 2022-02-01 RX ORDER — FOLIC ACID 1 MG/1
1 TABLET ORAL DAILY
COMMUNITY
Start: 2022-02-02

## 2022-02-01 RX ORDER — ERGOCALCIFEROL 1.25 MG/1
50000 CAPSULE, LIQUID FILLED ORAL
COMMUNITY
Start: 2022-02-03

## 2022-02-01 RX ORDER — FUROSEMIDE 40 MG
40 TABLET ORAL DAILY
COMMUNITY
Start: 2022-02-02

## 2022-02-01 RX ADMIN — FUROSEMIDE 40 MG: 40 TABLET ORAL at 08:34

## 2022-02-01 RX ADMIN — LOSARTAN POTASSIUM 50 MG: 50 TABLET, FILM COATED ORAL at 08:34

## 2022-02-01 RX ADMIN — HYDROMORPHONE HYDROCHLORIDE 4 MG: 2 TABLET ORAL at 18:18

## 2022-02-01 RX ADMIN — HYDROMORPHONE HYDROCHLORIDE 4 MG: 2 TABLET ORAL at 15:44

## 2022-02-01 RX ADMIN — METOPROLOL SUCCINATE 25 MG: 25 TABLET, EXTENDED RELEASE ORAL at 08:34

## 2022-02-01 RX ADMIN — HYDROMORPHONE HYDROCHLORIDE 4 MG: 2 TABLET ORAL at 11:45

## 2022-02-01 RX ADMIN — CYCLOBENZAPRINE 10 MG: 10 TABLET, FILM COATED ORAL at 21:49

## 2022-02-01 RX ADMIN — ACETAMINOPHEN 650 MG: 325 TABLET, FILM COATED ORAL at 15:44

## 2022-02-01 RX ADMIN — HYDROMORPHONE HYDROCHLORIDE 4 MG: 2 TABLET ORAL at 21:49

## 2022-02-01 RX ADMIN — HYDROXYZINE HYDROCHLORIDE 25 MG: 25 TABLET ORAL at 18:18

## 2022-02-01 RX ADMIN — PANTOPRAZOLE SODIUM 40 MG: 40 TABLET, DELAYED RELEASE ORAL at 05:41

## 2022-02-01 RX ADMIN — FOLIC ACID 1 MG: 1 TABLET ORAL at 08:34

## 2022-02-01 RX ADMIN — ACETAMINOPHEN 650 MG: 325 TABLET, FILM COATED ORAL at 19:54

## 2022-02-01 RX ADMIN — POLYETHYLENE GLYCOL 3350 17 G: 17 POWDER, FOR SOLUTION ORAL at 08:33

## 2022-02-01 RX ADMIN — SENNOSIDES AND DOCUSATE SODIUM 1 TABLET: 50; 8.6 TABLET ORAL at 08:34

## 2022-02-01 RX ADMIN — ACETAMINOPHEN 650 MG: 325 TABLET, FILM COATED ORAL at 11:45

## 2022-02-01 RX ADMIN — HYDROMORPHONE HYDROCHLORIDE 4 MG: 2 TABLET ORAL at 08:34

## 2022-02-01 RX ADMIN — HYDROXYZINE HYDROCHLORIDE 25 MG: 25 TABLET ORAL at 11:45

## 2022-02-01 RX ADMIN — SENNOSIDES AND DOCUSATE SODIUM 1 TABLET: 50; 8.6 TABLET ORAL at 19:54

## 2022-02-01 RX ADMIN — PANTOPRAZOLE SODIUM 40 MG: 40 TABLET, DELAYED RELEASE ORAL at 15:44

## 2022-02-01 RX ADMIN — CYCLOBENZAPRINE 10 MG: 10 TABLET, FILM COATED ORAL at 15:43

## 2022-02-01 RX ADMIN — HYDROMORPHONE HYDROCHLORIDE 4 MG: 2 TABLET ORAL at 05:07

## 2022-02-01 RX ADMIN — CYCLOBENZAPRINE 10 MG: 10 TABLET, FILM COATED ORAL at 08:34

## 2022-02-01 RX ADMIN — BUPROPION HYDROCHLORIDE 200 MG: 200 TABLET, EXTENDED RELEASE ORAL at 08:34

## 2022-02-01 RX ADMIN — ACETAMINOPHEN 650 MG: 325 TABLET, FILM COATED ORAL at 05:41

## 2022-02-01 RX ADMIN — ASPIRIN 325 MG: 325 TABLET, DELAYED RELEASE ORAL at 08:34

## 2022-02-01 ASSESSMENT — ACTIVITIES OF DAILY LIVING (ADL)
ADLS_ACUITY_SCORE: 11

## 2022-02-01 NOTE — PLAN OF CARE
A&Ox4. VSS on RA. Pt complained of right hip and left knee pain, along with spasms. Pain managed with scheduled flexeril, PRN PO dilaudid, tylenol and atarax. CMS intact. Pt not OOB overnight, Up w/ lift/assist x2, 50% WBAT to LLE. No hip precautions. Pt hesitant to turn at times, encouraging shifting weight. purewick in place. , 249. PIV SL Discharge to TCU pending, waiting on insurance authorization.

## 2022-02-01 NOTE — PLAN OF CARE
Patient alert and oriented x4. VSS on RA. Pain to right hip and left knee. Managed with scheduled and PRN medications. CMS intact. Up w/ lift/assist x2, 50% WBAT to LLE. No hip precautions. PT following. Tolerating diet, purewick in place, no BM, passing gas, blood sugar this evening 236. PIV x1 - patent/SL. Discharge to TCU pending, waiting on insurance authorization.

## 2022-02-01 NOTE — PROGRESS NOTES
Care Management Follow Up    Length of Stay (days): 9    Expected Discharge Date: 02/01/2022     Concerns to be Addressed: discharge planning     Patient plan of care discussed at interdisciplinary rounds: Yes    Anticipated Discharge Disposition: Transitional Care     Anticipated Discharge Services: None  Anticipated Discharge DME: None    Patient/family educated on Medicare website which has current facility and service quality ratings: no  Education Provided on the Discharge Plan:    Patient/Family in Agreement with the Plan: yes    Referrals Placed by CM/SW: Post Acute Facilities  Private pay costs discussed: Not applicable    Additional Information:  [facility] called and reported that [facility] has authorized TCU stay. Writer spoke to patient who confirmed that she will need a ride to TCU. Call to [employer] and wheelchair ride scheduled for 1700 today. Patient updated and in agreement and aware of possible out of pocket cost.     Addendum: Call received from [facility] that there is an issue and the room is not available. Call placed to [facility] and ride rescheduled to 2/2/22 @ 1030. Patient and charge nurse updated.    Addendum: Call from facility that room is not available tomorrow until after 1500. Call to [facility] and ride rescheduled for 2/2/22 @ 1500.      CAROL Montana

## 2022-02-01 NOTE — DISCHARGE SUMMARY
[facility]  Hospitalist Discharge Summary      Discharge fell through, please let this serve as the progress note.       Date of Admission:  1/23/2022  Date of Discharge:  No discharge date for patient encounter.  Discharging Provider: Cassidy Gutierrez MD  Discharge Service: Hospitalist Service    Discharge Diagnoses   Acute, mildly impacted, basicervical fracture of right femur secondary to mechanical fall, s/p open reduction and internal fixation using hip screw, 1/23/2022    Follow-ups Needed After Discharge   Follow-up Appointments     Follow Up and recommended labs and tests      Follow up with Dr. Avendano/Kain Martin PAC , [facility] city[facility] within 14 days  to evaluate after   surgery. No follow up labs or test are needed prior to visit. At this   visit x-rays will be taken, sutures/staples removed, and questions to be   answered.  Bring any needed forms with to appointment.  Call for appointment (Misti Beatty- Patient Coordinator): [phone]  After hours: [phone]         Follow Up and recommended labs and tests      Follow up with primary care provider in 7 days.  No follow up labs or   test are needed.           Unresulted Labs Ordered in the Past 30 Days of this Admission     No orders found from 12/24/2021 to 1/24/2022.        Discharge Disposition   Discharged to short-term care facility  Condition at discharge: Stable  Patient ready to discharge to a skilled nursing facility as soon as possible in order to create capacity for patients related to the COVID-19 pandemic.    Hospital Course            Lamar Menendez is a pleasant 61-year-old female with hypertension, diabetes mellitus type 2, alcohol use disorder, previous gastric bypass surgery, previous esophagitis and anastomotic ulcer, iron deficiency anemia, depression/anxiety, PTSD who was brought in due to right femoral neck fracture secondary to mechanical fall at home.  She  had 2 falls at home, first she slipped on a rug and was seen at outside hospital.  Work-up was negative for fracture.  When she returned home, her right leg gave out with weightbearing resulting in second fall.  She was then brought to [facility] and found to have right femur basicervical fracture.  She underwent ORIF on 1/22/2022.  Postoperatively she complained of significant pain again requested.  Left posterior thigh pain and cough.  MRI of knee showed complete meniscal tear and arthritis of left knee.  She received steroid injection to the left knee on 1/30/2022.  She has not been able to get up or ambulate independently so far.     #.  Acute, mildly impacted, basicervical fracture of right femur secondary to mechanical fall, s/p open reduction and internal fixation using hip screw, 1/23/2022  -Doing well.  Pain is controlled.    -Needs 2 person assist or lift, mainly due to left lower extremity pain  -Management as per orthopedics.  -Weightbearing status, DVT prophylaxis, PT/OT as per orthopedics  -Will need TCU     #.  Complete lateral meniscal tear of left knee   -Report significant left calf and left posterior thigh pain since her fall.  Unable to bear weight during.  No fractures   - US negative for DVT but showed likely ruptured baker's cyst.   -MRI of the knee showed high-grade likely complete lateral meniscal tear, full-thickness cartilage loss over femoral condyle and tibial plateau  -Received corticosteroid injection to left knee on 1/30/2022  - continue PT and pain control with tylenol, prn Dilaudid   -Management as per orthopedics     #.  Gradually worsening chronic iron deficiency anemia  -Presented with hemoglobin of 7.9.  Received 1 unit PRBC perioperatively.  Received IV iron at outside hospital prior to transfer  -Hemoglobin 8.4 and stable  -Records reviewed.  Hemoglobin was 11.1 a year ago and since then has gradually been trending down, was 9.7 on 1/13/2022.  This is most  likely secondary to iron deficiency given her history of gastric bypass surgery.  -Recent labs showed ferritin of 13, normal vitamin B12   -Assessed by GI-does not appear to have any GI bleed.  EGD not recommended     #.  Previous esophagitis and anastomotic ulcer  -EGD 2018 showed anastomotic ulcer  -On PPI, continue Protonix.  -Was taking NSAIDs PTA, also daily alcohol use  -Avoid NSAIDs     #.  S/p Kavon-en-Y gastric bypass-has been noncompliant with supplements     #.  Diabetes mellitus type 2, without long-term use of insulin, uncontrolled with most recent hemoglobin A1c 10.4, with hyperglycemia on admission, blood sugar over 400  -Resume Trulicity   -On Lantus + NovoLog carb coverage with meals and sliding scale  -Blood sugars are well controlled  - continue lantus to 20 units daily, continue novolog 2 units per carb tid with meals  - continue sliding scale and continue to adjust insulin accordingly     #.  Alcohol use disorder   -Longstanding history of alcohol use.  Used to drink heavily until 2009, then quit for 12 years after inpatient treatment.  -Relapsed few months ago and now drinks 2-3 drinks per day.  She is not interested in quitting alcohol  -no signs of alcohol withdrawal, CIWA protocol discontinued  - completed 5 course of thiamine     #.  Alcoholic hepatitis -has elevated AST and ultrasound with fatty infiltration     #.  Distended gallbladder on ultrasound, without evidence of acute cholecystitis-  -No right upper quadrant pain or tenderness but has distended gallbladder with mild thickening and mildly dilated CBD.  -GI has recommended outpatient right upper quadrant ultrasound.  Unable to perform EUS due to altered anatomy from previous gastric bypass surgery     #.  New diagnosis of diastolic CHF exacerbation with preserved EF of 55-55%   -No acute exacerbation   -Echo also showed LVH, grade 1 diastolic dysfunction, increased PA pressure 45 mmHg   -Outpatient sleep study recommended for  suspected RANI   -Continue Lasix 40 mg daily which is her PTA medicine      #.  Essential hypertension-  -Blood pressure mildly elevated.  Losartan increased to 50 mg daily, continue   -Also started on Toprol-XL 25 mg, continue     #.  Vitamin D deficiency-recent vitamin D low at 8  -Continue ergocalciferol 50,000 units weekly, started on 1/27     #.  Hyperlipidemia-recent lipid panel showed , HDL 84, triglycerides 106     #. Chronic major depression -   - reports chronic hopelessness. No suicidal ideation   - on wellbutrin, will continue            Consultations This Hospital Stay   ORTHOPEDIC SURGERY IP CONSULT  GASTROENTEROLOGY IP CONSULT  PHYSICAL THERAPY ADULT IP CONSULT  OCCUPATIONAL THERAPY ADULT IP CONSULT  PHYSICAL THERAPY ADULT IP CONSULT  OCCUPATIONAL THERAPY ADULT IP CONSULT  CARE MANAGEMENT / SOCIAL WORK IP CONSULT  PHYSICAL THERAPY ADULT IP CONSULT  OCCUPATIONAL THERAPY ADULT IP CONSULT    Code Status   No CPR- Do NOT Intubate    Time Spent on this Encounter   I, Cassidy Gutierrez MD, personally saw the patient today and spent greater than 30 minutes discharging this patient.       Cassidy Gutierrez MD  [facility]  [address] [place] [id number]  Phone: [phone]  Fax: [phone]  ______________________________________________________________________    Physical Exam   Vital Signs: Temp: 97.8  F (36.6  C) Temp src: Oral BP: (!) 161/79 Pulse: 69   Resp: 16 SpO2: 98 % O2 Device: None (Room air)    Weight: 213 lbs 13.54 oz  General Appearance: Well appearing for stated age.  Respiratory: CTAB, no rales or ronchi  Cardiovascular: S1, S2 normal, no murmurs  GI: non-tender on palpation, BS present         Primary Care Physician   [facility]    Discharge Orders      General info for SNF    Length of Stay Estimate: Short Term Care: Estimated # of Days <30  Condition at Discharge: Improving  Level of care:skilled   Rehabilitation Potential:  Excellent  Admission H&P remains valid and up-to-date: Yes  Recent Chemotherapy: N/A  Use Nursing Home Standing Orders: Yes     Mantoux instructions    Give two-step Mantoux (PPD) Per Facility Policy Yes     Follow Up and recommended labs and tests    Follow up with Dr. Avendano/Kain Martin PAC , at [facility] city[facility], within 14 days  to evaluate after surgery. No follow up labs or test are needed prior to visit. At this visit x-rays will be taken, sutures/staples removed, and questions to be answered.  Bring any needed forms with to appointment.  Call for appointment (Paulette- Patient Coordinator): [phone]  After hours: [phone]     Reason for your hospital stay    Right basocervical femoral neck fracture DHS     Weight bearing status    50% WB on the right LE with walker     Activity - Up with assistive device     Wound care    Site:   Right lateral hip  Instructions:  Leave dressing intact for 2 weeks, call if becomes overly saturated (>50%); do not soak or submerge, ok to shower, reinforce if needed     General info for SNF    Length of Stay Estimate: Short Term Care: Estimated # of Days 31-90  Condition at Discharge: Improving  Level of care:skilled   Rehabilitation Potential: Good  Admission H&P remains valid and up-to-date: Yes  Recent Chemotherapy: N/A  Use Nursing Home Standing Orders: Yes     Mantoux instructions    Give two-step Mantoux (PPD) Per Facility Policy Yes     Follow Up and recommended labs and tests    Follow up with primary care provider in 7 days.  No follow up labs or test are needed.     Reason for your hospital stay    You were treated with     Activity - Up ad marilia     Physical Therapy Adult Consult    Evaluate and treat as clinically indicated.    Reason:  Right basocervical femoral neck fracture DHS     Occupational Therapy Adult Consult    Evaluate and treat as clinically indicated.    Reason:  Right basocervical femoral neck  fracture DHS     Physical Therapy Adult Consult    Evaluate and treat as clinically indicated.    Reason:  Deconditioning     Occupational Therapy Adult Consult    Evaluate and treat as clinically indicated.    Reason:  Deconditioning     Fall precautions     Walker DME    : DME Documentation: Describe the reason for need to support medical necessity: Impaired gait status post hip surgery. I, the undersigned, certify that the above prescribed supplies are medically necessary for this patient and is both reasonable and necessary in reference to accepted standards of medical practice in the treatment of this patient's condition and is not prescribed as a convenience.     Diet    Follow this diet upon discharge: Orders Placed This Encounter      Regular Diet Adult     Diet    Follow this diet upon discharge: Orders Placed This Encounter      Diet      Consistent Carbohydrate Diet Low Consistent Carb (45 g CHO per Meal) Diet       Significant Results and Procedures   Most Recent 3 CBC's:Recent Labs   Lab Test 01/28/22  0905 01/25/22  0924 01/24/22  0845 01/23/22  0444 01/23/22  0034   WBC  --   --   --   --  7.4   HGB 8.8* 8.4* 8.4*   < > 7.9*   MCV  --   --   --   --  68*   PLT  --   --   --   --  265    < > = values in this interval not displayed.   ,   Results for orders placed or performed during the hospital encounter of 01/23/22   XR Pelvis w Hip Right 1 View    Narrative    EXAM: XR PELVIS AND HIP RIGHT 1 VIEW  LOCATION: [facility]  DATE/TIME: 1/23/2022 12:45 AM    INDICATION: Pain  COMPARISON: None.      Impression    IMPRESSION: Acute mildly impacted basicervical fracture of the right femur. Questionable nondisplaced fracture of the right superior pubic ramus. No dislocation. Bones are demineralized. Right femoral fixation plate and screws incompletely imaged.   Extensive peripheral arterial calcifications.   US Abdomen Limited    Narrative    EXAM: US ABDOMEN LIMITED  LOCATION:   [facility]  DATE/TIME: 1/23/2022 11:25 AM    INDICATION: Elevated LFTs.  COMPARISON: None.  TECHNIQUE: Limited abdominal ultrasound.    FINDINGS:    GALLBLADDER: Distended gallbladder. Echogenic wall thickening and/or calcification noted which is nonspecific. No definite shadowing stones.    BILE DUCTS: Mild extrahepatic biliary dilatation. The common duct measures 8 mm.    LIVER: Increased echogenicity from diffuse fatty infiltration. No focal mass.    RIGHT KIDNEY: No hydronephrosis.    PANCREAS: Borderline prominent pancreatic duct at 3 mm.    No ascites.      Impression    IMPRESSION:  1.  Mild extrahepatic bile duct dilatation and borderline pancreatic ductal dilatation of uncertain etiology.    2.  Distended gallbladder without definite cholecystitis. Somewhat unusual focal thickening and/or calcification in the posterior gallbladder wall of uncertain etiology.    3.  Extensive fatty infiltration of the liver.       XR Surgery ARASELI Fluoro L/T 5 Min w Stills    Narrative    EXAM: XR SURGERY ARASELI FLUORO LESS THAN 5 MIN W STILLS  LOCATION: [facility]  DATE/TIME: 1/23/2022 3:00 PM    INDICATION: Intraoperative fluoroscopy taken during an orthopedic procedure.  COMPARISON: 1/23/2022.  TECHNIQUE: Exam performed by surgeon.    FINDINGS:    FLUOROSCOPIC TIME: 1.3  NUMBER OF IMAGES: 6.      Impression    IMPRESSION: Multiple fluoroscopic spot films of the hip taken during an ORIF of a femoral neck fracture with lateral plate and femoral neck screw fixation. Negative for intraoperative purposes. Please see the operative note for further details.   XR Lumbar Spine 2/3 Views    Narrative    EXAM: XR LUMBAR SPINE 2-3 VIEWS  LOCATION: [facility]  DATE/TIME: 1/24/2022 9:49 PM    INDICATION: Left lower extremity radiculopathy.  COMPARISON: None.  TECHNIQUE: CR Lumbar Spine.      Impression    IMPRESSION: Decreased osseous mineralization degrades  evaluation. Subtle fractures cannot be excluded. The L4 and L5 vertebral body heights cannot be reliably assessed on the sagittal images. There is an age-indeterminate superior endplate compression   deformity noted involving the presumed L1 vertebral body with approximately 20% height loss. The L2 and L3 vertebral body heights are maintained. Alignment appears maintained. Multilevel degenerative changes are present. No definite acute extraspinal   abnormality. The patient is status post right-sided total hip arthroplasty. There is mild levoconvex curvature of the lumbar spine.   XR Femur Right 2 Views    Narrative    EXAM: XR FEMUR RIGHT 2 VIEW  LOCATION: [facility]  DATE/TIME: 1/25/2022 5:40 PM    INDICATION: Right leg postoperative follow-up.  COMPARISON: 1/23/2022.      Impression    IMPRESSION:  1.  ORIF right proximal femur fracture with lateral plate and femoral neck screw fixation. The orthopedic hardware is intact and the fracture is reduced. The femoral neck screw is within the lateral margin of the femoral neck.  2.  Plate and screw fixation of a healed distal femur fracture with lateral plate and screw fixation. Total knee arthroplasty.  3.  Mild right hip and sacroiliac degenerative arthrosis.  4.  Skin staples in the lateral thigh.   MR Lumbar Spine w/o Contrast    Narrative    EXAM: MR LUMBAR SPINE W/O CONTRAST  LOCATION: [facility]  DATE/TIME: 1/26/2022 7:54 PM    INDICATION: Low back pain, trauma  COMPARISON: X-ray 01/24/2022  TECHNIQUE: Routine Lumbar Spine MRI without IV contrast.    FINDINGS:   Nomenclature assumes 5 lumbar type vertebra and hypoplastic ribs at T12. Mild lumbar levocurvature. 2 to 3 mm retrolisthesis at L1-L2 and 2 mm anterolisthesis at L4-L5. Mild chronic superior plate depression at L1 consistent with findings on the previous   radiographs. Additional mild chronic superior endplate depression at L5. Mild Modic type one  endplate edema at T10-T11 and T11-T12. Modic type II endplate signal changes superiorly at L5. Mild facet complex edema at L4-L5 and L5-S1. Normal distal spinal   cord and cauda equina with conus medullaris at mid L2. Posterior paraspinal muscular atrophy. Osteoarthritic changes of the visualized sacroiliac joints.    T12-L1: Disc desiccation with minor loss of disc height. Tiny central disc extrusion. Mild facet arthropathy. No spinal canal or neural foraminal stenosis.     L1-L2: Mild to moderate loss of disc height and signal, greater on the right. Circumferential disc bulge and endplate spurring with asymmetric right foraminal and lateral disc osteophyte complex. Mild facet arthropathy. Mild narrowing of the right   lateral recess without central spinal canal stenosis. Moderate right neural foraminal stenosis. No left neural foraminal stenosis.    L2-L3: Disc desiccation with preserved disc height. Slight annular bulge without focal disc herniation. Mild to moderate facet arthropathy. Mildly mentum flavum thickening. No spinal canal or neural foraminal stenosis.     L3-L4: Disc desiccation with preserved disc height. Mild circumferential disc bulge. Moderate facet arthropathy and ligamentum flavum thickening. No spinal canal stenosis. Partial effacement of the inferior neural foramina without significant neural   foraminal stenosis.    L4-L5: Disc desiccation with preserved disc height. Broad-based posterior disc bulge and endplate spurring with asymmetric right foraminal and lateral disc osteophyte complex. Moderate to advanced facet arthropathy and ligamentum flavum thickening.   Asymmetric narrowing of the right lateral recess without central spinal canal stenosis. Mild right neural foraminal stenosis. No left neural foraminal stenosis.    L5-S1: Disc desiccation with preserved disc height. Slight annular bulge without focal disc herniation. Minor endplate spurring with asymmetric left lateral disc osteophyte  complex. Moderate facet arthropathy. No spinal canal stenosis. Mild right neural   foraminal stenosis. No left neural foraminal stenosis.      Impression    IMPRESSION:  1.  Mild to moderate multilevel lumbar spondylosis.  2.  Mild narrowing of the lateral recesses at several levels without high-grade central spinal canal stenosis.  3.  Moderate right neural foraminal stenosis at L1-L2. Mild right neural foraminal stenosis at L4-L5.  4.  Chronic superior endplate compression deformities of L1 and L5.   XR Knee Right 1/2 Views    Narrative    EXAM: XR KNEE RT 1 /2 VW  LOCATION: [facility]  DATE/TIME: 1/25/2022 5:50 PM    INDICATION: Right knee pain.  COMPARISON: None.      Impression    IMPRESSION:  1.  Right total knee arthroplasty with patellar resurfacing. The components are well seated without evidence of complication. Small joint effusion.  2.  ORIF healed distal femur fracture with lateral plate and screw fixation.  3.  Bone demineralization.  4.  Atherosclerotic calcification.   US Lower Extremity Venous Duplex Left    Narrative    US LOWER EXTREMITY VENOUS DUPLEX LEFT  1/26/2022 1:16 PM    CLINICAL HISTORY/INDICATION: left calf pain and tenderness    COMPARISON: None relevant    TECHNIQUE:   Grayscale, color-flow, and spectral waveform analysis were performed  of the deep veins of the left lower extremity    FINDINGS:   The left common femoral vein, femoral vein and popliteal vein  demonstrate normal compressibility, spectral waveform, color flow and  augmentation.    The left posterior tibial vein, peroneal vein, and greater saphenous  vein are compressible.    The contralateral right common femoral vein demonstrates normal  compressibility, spectral waveform, color flow and augmentation.    Anechoic structure in left popliteal fossa measuring 4.1 x 2.8 x 0.4  cm. Significant edema is seen in the left calf particularly  surrounding this area.      Impression    IMPRESSION:   1. No  evidence of deep venous thrombosis in the left lower extremity.  2. Left Baker's cyst measuring 4.1 x 2.8 x 0.4 cm with surrounding  significant edema. May represent a ruptured Lang's cyst.    DEVIN HOBBS MD         SYSTEM ID:  [id number]   MR Knee Left w/o Contrast    Narrative    EXAM: MR KNEE LEFT W/O CONTRAST  LOCATION: [facility]  DATE/TIME: 1/28/2022 7:05 PM    INDICATION: Acute left knee pain.  COMPARISON: None.  TECHNIQUE: Unenhanced.    FINDINGS:    MEDIAL COMPARTMENT:   -Meniscus: Normal.  -Cartilage: Partial-thickness cartilage fissuring with intrasubstance delamination over the central weightbearing femoral condyle measuring 9 x 9 mm. The tibial plateau cartilage is intact and well preserved.    LATERAL COMPARTMENT:  -Meniscus: Diffuse myxoid degeneration of the medial meniscus. High-grade, probable complete radial tear at the junction of the posterior body and posterior horn. The body of the meniscus is peripherally extruded into the lateral gutter. There is also   poorly defined degeneration and partial tearing of the anterior horn and root.  -Cartilage: Smooth full-thickness cartilage loss over the weightbearing lateral femoral condyle and tibial plateau.    PATELLOFEMORAL COMPARTMENT:   -Alignment: Patella midline. No subluxation or tilting.   -Cartilage: Normal.    CRUCIATE LIGAMENTS:   -ACL: Normal.  -PCL: Normal.    COLLATERAL LIGAMENTS:   -Medial collateral ligament: Intact with normal appearance.  -Lateral collateral ligament: Intact with normal appearance.    POSTEROMEDIAL CORNER:  -Distal semimembranosus tendon is normal.   -Pes anserine tendons are normal. Posteromedial corner complex ligaments are intact.    POSTEROLATERAL CORNER:   -Popliteal tendon is intact. No tendinopathy.  -Biceps femoris tendon and posterolateral corner complex ligaments are intact.    EXTENSOR MECHANISM:   -Quadriceps tendon: Mild distal tendinosis without tearing.  -Patellar tendon:  Moderate proximal and distal tendinosis without tearing or acute tendinitis.  -Patellofemoral ligaments and retinacula: Intact.    JOINT:   -Small knee joint effusion. No loose bodies visualized. Moderate-sized popliteal cyst.    BONES:  -Negative for fracture, contusion, AVN, or significant bone marrow edema. No bone marrow replacement lesion.  -Moderate size lateral femoral condyle and tibial plateau osteophytes. Tiny medial and patellofemoral compartment osteophytes.    SOFT TISSUES:   -Moderate nonspecific subcutaneous edema throughout the left leg, may represent lymphedema/volume overload.  -Moderate generalized atrophy of the musculature in the distal thigh and proximal calf. Increased T2 signal/edema within the muscles of the anterior compartment likely represents disuse or denervation change.  -Nerves and vessels are within normal limits for MRI.       Impression    IMPRESSION:  1.  End-stage degenerative arthritis in the lateral compartment, including myxoid degeneration and a high-grade radial tear of the lateral meniscus (likely complete), and broad areas of full-thickness cartilage loss over the femoral condyle and tibial   plateau. Large lateral femoral condyle and tibial plateau osteophytes are present.    2.  Small knee joint effusion, and moderate-sized popliteal cyst. No loose intra-articular bodies or significant synovitis visualized.    3.  Small area of grade III chondromalacia in the medial compartment of the femoral condyle. The medial meniscus is intact and is normal.    4.  Patellofemoral cartilage is intact, without significant arthritic changes.    5.  Ligaments and myotendinous structures are intact, without tearing or acute strain.    6.  Moderate generalized atrophy of the musculature in the left knee, suggesting chronic disuse or denervation change.    7.  Moderate nonspecific subcutaneous edema in the left knee. No focal fluid collection.   Echocardiogram Complete     Value    LVEF   50-55%    Narrative    [id number]  [id number]  [id number]  [id number]^JEAN-PIERRE^CHRISTINA^E     [facility]  Echocardiography Laboratory  [address] [place] [address]     Name: RENE RODRÍGUEZ  MRN: [id number]  : 1960  Study Date: 2022 08:34 AM  Age: 61 yrs  Gender: Female  Patient Location: [facility]  Reason For Study: Heart Failure  Ordering Physician: CHRISTINA MOREJON  Performed By: Lucita Carrera     BSA: 2.0 m2  Height: 64 in  Weight: 213 lb  HR: 90  BP: 168/86 mmHg  ______________________________________________________________________________  Procedure  Complete Portable Echo Adult. [employer]ison (NDC #[id number]) given intravenously.  ______________________________________________________________________________  Interpretation Summary     1. Left ventricular systolic function is low normal. The visual ejection  fraction is 50-55%.  2. No regional wall motion abnormalities noted.  3. The right ventricle is normal in structure, function and size.  4. There is mild (1+) mitral regurgitation.  5. Mild to moderate pulmonary hypertension; The right ventricular systolic  pressure is approximated at 45mmHg plus the right atrial pressure. IVC  diameter <2.1 cm collapsing >50% with sniff suggests a normal RA pressure of 3  mmHg.  ______________________________________________________________________________  Left Ventricle  The left ventricle is normal in size. There is mild concentric left  ventricular hypertrophy. The visual ejection fraction is 50-55%. Left  ventricular systolic function is low normal. Grade I or early diastolic  dysfunction. No regional wall motion abnormalities noted.     Right Ventricle  The right ventricle is normal in structure, function and size.     Atria  The left atrium is mildly dilated. Right atrial size is normal. There is no  color Doppler evidence of an atrial shunt.     Mitral Valve  There is mild mitral annular calcification. There is mild (1+)  mitral  regurgitation.     Tricuspid Valve  The tricuspid valve is normal in structure and function. There is mild (1+)  tricuspid regurgitation. The right ventricular systolic pressure is  approximated at 45mmHg plus the right atrial pressure.     Aortic Valve  The aortic valve is normal in structure and function.     Pulmonic Valve  The pulmonic valve is normal in structure and function. There is trace  pulmonic valvular regurgitation.     Vessels  Normal size aorta. IVC diameter <2.1 cm collapsing >50% with sniff suggests a  normal RA pressure of 3 mmHg.     Pericardium  There is no pericardial effusion.     Rhythm  Sinus rhythm was noted.  ______________________________________________________________________________  MMode/2D Measurements & Calculations     IVSd: 1.4 cm  LVIDd: 4.8 cm  LVIDs: 3.7 cm  LVPWd: 1.2 cm  FS: 24.0 %  LV mass(C)d: 251.0 grams  LV mass(C)dI: 124.9 grams/m2  Ao root diam: 3.3 cm  LA dimension: 4.4 cm  asc Aorta Diam: 3.5 cm  LA/Ao: 1.3  LA Volume (BP): 71.6 ml  LA Volume Index (BP): 35.6 ml/m2  RWT: 0.52     TAPSE: 2.6 cm     Doppler Measurements & Calculations  MV E max michelle: 76.7 cm/sec  MV A max michelle: 89.8 cm/sec  MV E/A: 0.85  MV dec time: 0.17 sec  TR max michelle: 334.2 cm/sec  TR max P.7 mmHg  E/E' av.3  Lateral E/e': 10.1  Medial E/e': 12.5     ______________________________________________________________________________  Report approved by: Betsy Bingham 2022 09:29 AM               Discharge Medications   Current Discharge Medication List      START taking these medications    Details   acetaminophen (TYLENOL) 325 MG tablet Take 3 tablets (975 mg) by mouth every 8 hours as needed for mild pain    Associated Diagnoses: Closed fracture of right hip, initial encounter (H)      aspirin (ASA) 325 MG EC tablet Take 1 tablet (325 mg) by mouth daily  Qty: 30 tablet, Refills: 0    Associated Diagnoses: Closed fracture of right hip, initial encounter (H)       cyclobenzaprine (FLEXERIL) 10 MG tablet Take 1 tablet (10 mg) by mouth 3 times daily      folic acid (FOLVITE) 1 MG tablet Take 1 tablet (1 mg) by mouth daily      hydrOXYzine (ATARAX) 25 MG tablet Take 1 tablet (25 mg) by mouth every 6 hours as needed for other or anxiety (adjuvant pain)    Associated Diagnoses: Closed fracture of right hip, initial encounter (H)      insulin glargine (LANTUS PEN) 100 UNIT/ML pen Inject 20 Units Subcutaneous every morning (before breakfast)  Qty: 15 mL    Comments: If Lantus is not covered by insurance, may substitute Basaglar or Semglee or other insulin glargine product per insurance preference at same dose and frequency.        methocarbamol (ROBAXIN) 500 MG tablet Take 1 tablet (500 mg) by mouth 4 times daily as needed for muscle spasms    Associated Diagnoses: Closed fracture of right hip, initial encounter (H)      metoprolol succinate ER (TOPROL-XL) 25 MG 24 hr tablet Take 1 tablet (25 mg) by mouth daily      multivitamin w/minerals (THERA-VIT-M) tablet Take 1 tablet by mouth daily      oxyCODONE (ROXICODONE) 10 MG tablet Take 0.5-1 tablets (5-10 mg) by mouth every 4 hours as needed for severe pain (half tab for moderate pain (pain rating 4-6) whole tab for severe pain (pain rating 7-10))  Qty: 25 tablet, Refills: 0    Associated Diagnoses: Closed fracture of right hip, initial encounter (H)      polyethylene glycol (MIRALAX) 17 g packet Take 17 g by mouth daily as needed for constipation    Associated Diagnoses: Closed fracture of right hip, initial encounter (H)      vitamin D2 (ERGOCALCIFEROL) 11877 units (1250 mcg) capsule Take 1 capsule (50,000 Units) by mouth every 7 days         CONTINUE these medications which have CHANGED    Details   furosemide (LASIX) 40 MG tablet Take 1 tablet (40 mg) by mouth daily      losartan (COZAAR) 50 MG tablet Take 1 tablet (50 mg) by mouth daily      pantoprazole (PROTONIX) 40 MG EC tablet Take 1 tablet (40 mg) by mouth 2 times daily  (before meals)         CONTINUE these medications which have NOT CHANGED    Details   benzonatate (TESSALON) 100 MG capsule Take 100 mg by mouth 3 times daily as needed for cough      buPROPion (WELLBUTRIN SR) 200 MG 12 hr tablet Take 200 mg by mouth 2 times daily      dulaglutide (TRULICITY) 1.5 MG/0.5ML pen Inject 1.5 mg Subcutaneous every 7 days Mondays      hydrOXYzine (VISTARIL) 25 MG capsule Take 25 mg by mouth every 6 hours as needed for itching or anxiety      naproxen sodium (ANAPROX) 220 MG tablet Take 220 mg by mouth 2 times daily as needed for moderate pain      traZODone (DESYREL) 100 MG tablet Take 100 mg by mouth nightly as needed for sleep      vitamin B-12 (CYANOCOBALAMIN) 250 MCG tablet Take 250 mcg by mouth daily      Vitamin D3 (CHOLECALCIFEROL) 125 MCG (5000 UT) tablet Take 125 mcg by mouth daily           Allergies   Allergies   Allergen Reactions     Latex

## 2022-02-02 VITALS
RESPIRATION RATE: 18 BRPM | WEIGHT: 213.85 LBS | BODY MASS INDEX: 36.51 KG/M2 | SYSTOLIC BLOOD PRESSURE: 143 MMHG | HEIGHT: 64 IN | OXYGEN SATURATION: 98 % | HEART RATE: 68 BPM | DIASTOLIC BLOOD PRESSURE: 70 MMHG | TEMPERATURE: 98.3 F

## 2022-02-02 LAB
GLUCOSE BLDC GLUCOMTR-MCNC: 130 MG/DL (ref 70–99)
GLUCOSE BLDC GLUCOMTR-MCNC: 136 MG/DL (ref 70–99)
GLUCOSE BLDC GLUCOMTR-MCNC: 147 MG/DL (ref 70–99)

## 2022-02-02 PROCEDURE — 250N000013 HC RX MED GY IP 250 OP 250 PS 637: Performed by: STUDENT IN AN ORGANIZED HEALTH CARE EDUCATION/TRAINING PROGRAM

## 2022-02-02 PROCEDURE — 250N000013 HC RX MED GY IP 250 OP 250 PS 637: Performed by: INTERNAL MEDICINE

## 2022-02-02 PROCEDURE — 99239 HOSP IP/OBS DSCHRG MGMT >30: CPT | Performed by: HOSPITALIST

## 2022-02-02 PROCEDURE — 250N000013 HC RX MED GY IP 250 OP 250 PS 637: Performed by: PHYSICIAN ASSISTANT

## 2022-02-02 RX ADMIN — METOPROLOL SUCCINATE 25 MG: 25 TABLET, EXTENDED RELEASE ORAL at 08:57

## 2022-02-02 RX ADMIN — ASPIRIN 325 MG: 325 TABLET, DELAYED RELEASE ORAL at 08:58

## 2022-02-02 RX ADMIN — POLYETHYLENE GLYCOL 3350 17 G: 17 POWDER, FOR SOLUTION ORAL at 08:58

## 2022-02-02 RX ADMIN — HYDROMORPHONE HYDROCHLORIDE 4 MG: 2 TABLET ORAL at 13:30

## 2022-02-02 RX ADMIN — ACETAMINOPHEN 650 MG: 325 TABLET, FILM COATED ORAL at 01:14

## 2022-02-02 RX ADMIN — HYDROXYZINE HYDROCHLORIDE 25 MG: 25 TABLET ORAL at 14:42

## 2022-02-02 RX ADMIN — HYDROXYZINE HYDROCHLORIDE 25 MG: 25 TABLET ORAL at 10:45

## 2022-02-02 RX ADMIN — PANTOPRAZOLE SODIUM 40 MG: 40 TABLET, DELAYED RELEASE ORAL at 08:57

## 2022-02-02 RX ADMIN — ACETAMINOPHEN 650 MG: 325 TABLET, FILM COATED ORAL at 14:59

## 2022-02-02 RX ADMIN — BUPROPION HYDROCHLORIDE 200 MG: 200 TABLET, EXTENDED RELEASE ORAL at 08:57

## 2022-02-02 RX ADMIN — FUROSEMIDE 40 MG: 40 TABLET ORAL at 08:58

## 2022-02-02 RX ADMIN — CYCLOBENZAPRINE 10 MG: 10 TABLET, FILM COATED ORAL at 08:57

## 2022-02-02 RX ADMIN — SENNOSIDES AND DOCUSATE SODIUM 1 TABLET: 50; 8.6 TABLET ORAL at 08:58

## 2022-02-02 RX ADMIN — FOLIC ACID 1 MG: 1 TABLET ORAL at 08:57

## 2022-02-02 RX ADMIN — LOSARTAN POTASSIUM 50 MG: 50 TABLET, FILM COATED ORAL at 08:57

## 2022-02-02 RX ADMIN — CYCLOBENZAPRINE 10 MG: 10 TABLET, FILM COATED ORAL at 14:41

## 2022-02-02 RX ADMIN — HYDROXYZINE HYDROCHLORIDE 25 MG: 25 TABLET ORAL at 01:14

## 2022-02-02 RX ADMIN — HYDROMORPHONE HYDROCHLORIDE 4 MG: 2 TABLET ORAL at 08:58

## 2022-02-02 ASSESSMENT — ACTIVITIES OF DAILY LIVING (ADL)
ADLS_ACUITY_SCORE: 11

## 2022-02-02 NOTE — PLAN OF CARE
Pt A&Ox4, VSS, CMS intact, pt c/o muscle spasms to LLE, dressing to right hip C,I with 50% saturation of serosang drainage on Aquacel, up with assist of 2 with gait belt/walker or lift to chair, had BM, purewick in place, mod carb diet with bg monitoring and insulin, dilaudid po/atarax/tylenol/flexeril for pain. Pt to discharge to TCU tomorrow.

## 2022-02-02 NOTE — PROGRESS NOTES
Orthopedic Surgery  Lamar Menendez  2022  Admit Date:  2022  POD: 10 Days Post-Op   Procedure(s):  OPEN REDUCTION INTERNAL FIXATION RIGHT HIP    Alert and oriented.   Patient resting comfortably in bed.    Pain and spasms are improving.   Tolerating oral intake.    Denies nausea or vomiting  Denies chest pain or shortness of breath    Vital Sign Ranges  Temperature Temp  Av.2  F (36.8  C)  Min: 97.8  F (36.6  C)  Max: 98.4  F (36.9  C)   Blood pressure Systolic (24hrs), Av , Min:123 , Max:143        Diastolic (24hrs), Av, Min:63, Max:93      Pulse Pulse  Av.4  Min: 54  Max: 78   Respirations Resp  Av.6  Min: 17  Max: 18   Pulse oximetry SpO2  Av %  Min: 95 %  Max: 98 %       Dressing is clean, dry, and intact. Gauze/tape applied by nurse as it continues to ooze.   Minimal erythema of the surrounding skin.   Bilateral calves are soft, non-tender.  Right lower extremity is NVI.   Sensation intact bilateral lower extremities  Patient able to resist dorsi and plantar flexion bilaterally  +Dp pulse    Labs:  Recent Labs   Lab Test 22  0034   WBC 7.4     Recent Labs   Lab Test 22  0905 22  0924 22  0845   HGB 8.8* 8.4* 8.4*     Recent Labs   Lab Test 22  0051   INR 1.01     Recent Labs   Lab Test 22  0034           1. PLAN:              Continue ASA for DVT prophylaxis.                Mobilize with PT/OT               50% WB Right LE, WBAT on Left.                Continue current pain regiment.              Dressings: Change daily.       2. Disposition              Anticipate d/c to TCU when medically cleared and progressing in PT.  Ortho stable.     Linn Cali PA-C

## 2022-02-02 NOTE — PLAN OF CARE
Physical Therapy Discharge Summary    Reason for therapy discharge:    Discharged to transitional care facility.    Progress towards therapy goal(s). See goals on Care Plan in [facility] electronic health record for goal details.  Goals partially met.  Barriers to achieving goals:   discharge from facility.    Therapy recommendation(s):    Continued therapy is recommended.  Rationale/Recommendations:  continue to progress functional strength and independence prior to returning home.

## 2022-02-02 NOTE — PROGRESS NOTES
VSS on RA. A&Ox4. Pain in R hip, muscle spasms in LLE. PRN dilaudid, atarax, tylenol given. Drsg to R hip saturated w serous output - changed at 2000. Up w Ax2, lift, or heavy pivot. Purewick in place. Discharge to TCU tmrw at 1500.

## 2022-02-02 NOTE — PLAN OF CARE
Pt discharged today at 1500 to TCU by wheelchair service. Pt states understanding of discharge instructions and medications.

## 2022-02-02 NOTE — PROGRESS NOTES
Care Management Discharge Note    Discharge Date: 02/02/2022       Discharge Disposition: Transitional Care to [facility] at 1500.     Discharge Services: None    Discharge DME: None    Discharge Transportation: agency    Private pay costs discussed: transportation costs    PAS Confirmation Code:  [id number]  Patient/family educated on Medicare website which has current facility and service quality ratings: no    Education Provided on the Discharge Plan:    Persons Notified of Discharge Plans: Patient, nurse, charge,   Patient/Family in Agreement with the Plan: yes    Handoff Referral Completed: Yes    Additional Information:  Writer confirmed with Magui at [facility] that they have Auth and are agreeable to 1500 ride. Writer paged MD to update orders with signature. Writer completed Pas and faxed. Charge nurse to update bedside nurse    PAS-RR    D: Per DHS regulation, SW completed and submitted PAS-RR to [place] [employer] via the [employer].  PAS-RR confirmation # is : [id number]    I: SW spoke with patient and they are aware a PAS-RR has been submitted.  SW reviewed with patient  that they may be contacted for a follow up appointment within 10 days of hospital discharge if their SNF stay is < 30 days.  Contact information for [employer] was also provided.    A: patient verbalized understanding.    P: Further questions may be directed to [employer] Line at #[phone], option #4 for PAS-RR staff.    KENNEDY Benavidez, SW   Social Work   [facility]

## 2022-02-02 NOTE — DISCHARGE SUMMARY
[facility]  Hospitalist Discharge Summary      Date of Admission:  1/23/2022  Date of Discharge:  No discharge date for patient encounter.  Discharging Provider: Cassidy Gutierrez MD  Discharge Service: Hospitalist Service    Discharge Diagnoses   Acute, mildly impacted, basicervical fracture of right femur secondary to mechanical fall, s/p open reduction and internal fixation using hip screw, 1/23/2022    Follow-ups Needed After Discharge   Follow-up Appointments     Follow Up and recommended labs and tests      Follow up with Dr. Avendano/Kain Martin PAC , at (location with clinic name   or city[facility], within 14 days  to evaluate after   surgery. No follow up labs or test are needed prior to visit. At this   visit x-rays will be taken, sutures/staples removed, and questions to be   answered.  Bring any needed forms with to appointment.  Call for appointment (Misti Beatty- Patient Coordinator): [phone]  After hours: [phone]         Follow Up and recommended labs and tests      Follow up with primary care provider in 7 days.  No follow up labs or   test are needed.           Unresulted Labs Ordered in the Past 30 Days of this Admission     No orders found from 12/24/2021 to 1/24/2022.        Discharge Disposition   Discharged to short-term care facility  Condition at discharge: Stable  Patient ready to discharge to a skilled nursing facility as soon as possible in order to create capacity for patients related to the COVID-19 pandemic.    Hospital Course            Lamar Menendez is a pleasant 61-year-old female with hypertension, diabetes mellitus type 2, alcohol use disorder, previous gastric bypass surgery, previous esophagitis and anastomotic ulcer, iron deficiency anemia, depression/anxiety, PTSD who was brought in due to right femoral neck fracture secondary to mechanical fall at home.  She had 2 falls at home, first she slipped on a rug and was seen at outside  hospital.  Work-up was negative for fracture.  When she returned home, her right leg gave out with weightbearing resulting in second fall.  She was then brought to [facility] and found to have right femur basicervical fracture.  She underwent ORIF on 1/22/2022.  Postoperatively she complained of significant pain again requested.  Left posterior thigh pain and cough.  MRI of knee showed complete meniscal tear and arthritis of left knee.  She received steroid injection to the left knee on 1/30/2022.  She has not been able to get up or ambulate independently so far.     #.  Acute, mildly impacted, basicervical fracture of right femur secondary to mechanical fall, s/p open reduction and internal fixation using hip screw, 1/23/2022  -Doing well.  Pain is controlled.    -Needs 2 person assist or lift, mainly due to left lower extremity pain  -Management as per orthopedics.  -Weightbearing status, DVT prophylaxis, PT/OT as per orthopedics  -Will need TCU     #.  Complete lateral meniscal tear of left knee   -Report significant left calf and left posterior thigh pain since her fall.  Unable to bear weight during.  No fractures   - US negative for DVT but showed likely ruptured baker's cyst.   -MRI of the knee showed high-grade likely complete lateral meniscal tear, full-thickness cartilage loss over femoral condyle and tibial plateau  -Received corticosteroid injection to left knee on 1/30/2022  - continue PT and pain control with tylenol, prn Dilaudid   -Management as per orthopedics     #.  Gradually worsening chronic iron deficiency anemia  -Presented with hemoglobin of 7.9.  Received 1 unit PRBC perioperatively.  Received IV iron at outside hospital prior to transfer  -Hemoglobin 8.4 and stable  -Records reviewed.  Hemoglobin was 11.1 a year ago and since then has gradually been trending down, was 9.7 on 1/13/2022.  This is most likely secondary to iron deficiency given her history of gastric bypass  surgery.  -Recent labs showed ferritin of 13, normal vitamin B12   -Assessed by GI-does not appear to have any GI bleed.  EGD not recommended     #.  Previous esophagitis and anastomotic ulcer  -EGD 2018 showed anastomotic ulcer  -On PPI, continue Protonix.  -Was taking NSAIDs PTA, also daily alcohol use  -Avoid NSAIDs     #.  S/p Kavon-en-Y gastric bypass-has been noncompliant with supplements     #.  Diabetes mellitus type 2, without long-term use of insulin, uncontrolled with most recent hemoglobin A1c 10.4, with hyperglycemia on admission, blood sugar over 400  -Resume Trulicity   -On Lantus + NovoLog carb coverage with meals and sliding scale  -Blood sugars are well controlled  - continue lantus to 20 units daily, continue novolog 2 units per carb tid with meals  - continue sliding scale and continue to adjust insulin accordingly     #.  Alcohol use disorder   -Longstanding history of alcohol use.  Used to drink heavily until 2009, then quit for 12 years after inpatient treatment.  -Relapsed few months ago and now drinks 2-3 drinks per day.  She is not interested in quitting alcohol  -no signs of alcohol withdrawal, CIWA protocol discontinued  - completed 5 course of thiamine     #.  Alcoholic hepatitis -has elevated AST and ultrasound with fatty infiltration     #.  Distended gallbladder on ultrasound, without evidence of acute cholecystitis-  -No right upper quadrant pain or tenderness but has distended gallbladder with mild thickening and mildly dilated CBD.  -GI has recommended outpatient right upper quadrant ultrasound.  Unable to perform EUS due to altered anatomy from previous gastric bypass surgery     #.  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   Essential hypertension-  -Blood pressure mildly elevated.  Losartan increased to 50 mg daily, continue   -Also started on Toprol-XL 25 mg, continue     #.  Vitamin D deficiency-recent vitamin D low at 8  -Continue ergocalciferol 50,000 units weekly, started on 1/27     #.  Hyperlipidemia-recent lipid panel showed , HDL 84, triglycerides 106     #. Chronic major depression -   - reports chronic hopelessness. No suicidal ideation   - on wellbutrin, will continue            Consultations This Hospital Stay   ORTHOPEDIC SURGERY IP CONSULT  GASTROENTEROLOGY IP CONSULT  PHYSICAL THERAPY ADULT IP CONSULT  OCCUPATIONAL THERAPY ADULT IP CONSULT  PHYSICAL THERAPY ADULT IP CONSULT  OCCUPATIONAL THERAPY ADULT IP CONSULT  CARE MANAGEMENT / SOCIAL WORK IP CONSULT  PHYSICAL THERAPY ADULT IP CONSULT  OCCUPATIONAL THERAPY ADULT IP CONSULT    Code Status   No CPR- Do NOT Intubate    Time Spent on this Encounter   I, Cassidy Gutierrez MD, personally saw the patient today and spent greater than 30 minutes discharging this patient.       Cassidy Gutierrez MD  [facility]  [address] [facility] [address]  Phone: [phone]  Fax: [phone]  ______________________________________________________________________    Physical Exam   Vital Signs: Temp: 98.3  F (36.8  C) Temp src: Axillary BP: (!) 143/70 Pulse: 68   Resp: 18 SpO2: 98 % O2 Device: None (Room air)    Weight: 213 lbs 13.54 oz  General Appearance: Well appearing for stated age.  Respiratory: CTAB, no rales or ronchi  Cardiovascular: S1, S2 normal, no murmurs  GI: non-tender on palpation, BS present         Primary Care Physician   [facility]    Discharge Orders      General info for SNF    Length of Stay Estimate: Short Term Care: Estimated # of Days <30  Condition at Discharge: Improving  Level of care:skilled   Rehabilitation Potential: Excellent  Admission H&P remains valid and up-to-date: Yes  Recent Chemotherapy: N/A  Use  Nursing Home Standing Orders: Yes     Mantoux instructions    Give two-step Mantoux (PPD) Per Facility Policy Yes     Follow Up and recommended labs and tests    Follow up with Dr. Avendano/Kain Martin PAC , at (location with clinic name or city[facility], within 14 days  to evaluate after surgery. No follow up labs or test are needed prior to visit. At this visit x-rays will be taken, sutures/staples removed, and questions to be answered.  Bring any needed forms with to appointment.  Call for appointment (Paulette- Patient Coordinator): [phone]  After hours: [phone]     Reason for your hospital stay    Right basocervical femoral neck fracture DHS     Weight bearing status    50% WB on the right LE with walker     Activity - Up with assistive device     General info for SNF    Length of Stay Estimate: Short Term Care: Estimated # of Days 31-90  Condition at Discharge: Improving  Level of care:skilled   Rehabilitation Potential: Good  Admission H&P remains valid and up-to-date: Yes  Recent Chemotherapy: N/A  Use Nursing Home Standing Orders: Yes     Mantoux instructions    Give two-step Mantoux (PPD) Per Facility Policy Yes     Follow Up and recommended labs and tests    Follow up with primary care provider in 7 days.  No follow up labs or test are needed.     Reason for your hospital stay    You were treated with     Activity - Up ad marilia     Wound care    Site:   Right lateral hip  Instructions:  Daily dressing changes.     Physical Therapy Adult Consult    Evaluate and treat as clinically indicated.    Reason:  Right basocervical femoral neck fracture DHS     Occupational Therapy Adult Consult    Evaluate and treat as clinically indicated.    Reason:  Right basocervical femoral neck fracture DHS     Physical Therapy Adult Consult    Evaluate and treat as clinically indicated.    Reason:  Deconditioning     Occupational Therapy Adult Consult    Evaluate and treat as clinically  indicated.    Reason:  Deconditioning     Fall precautions     Walker DME    : DME Documentation: Describe the reason for need to support medical necessity: Impaired gait status post hip surgery. I, the undersigned, certify that the above prescribed supplies are medically necessary for this patient and is both reasonable and necessary in reference to accepted standards of medical practice in the treatment of this patient's condition and is not prescribed as a convenience.     Diet    Follow this diet upon discharge: Orders Placed This Encounter      Regular Diet Adult     Diet    Follow this diet upon discharge: Orders Placed This Encounter      Diet      Consistent Carbohydrate Diet Low Consistent Carb (45 g CHO per Meal) Diet       Significant Results and Procedures   Most Recent 3 CBC's:  Recent Labs   Lab Test 01/28/22  0905 01/25/22  0924 01/24/22  0845 01/23/22  0444 01/23/22  0034   WBC  --   --   --   --  7.4   HGB 8.8* 8.4* 8.4*   < > 7.9*   MCV  --   --   --   --  68*   PLT  --   --   --   --  265    < > = values in this interval not displayed.   ,   Results for orders placed or performed during the hospital encounter of 01/23/22   XR Pelvis w Hip Right 1 View    Narrative    EXAM: XR PELVIS AND HIP RIGHT 1 VIEW  LOCATION: [facility]  DATE/TIME: 1/23/2022 12:45 AM    INDICATION: Pain  COMPARISON: None.      Impression    IMPRESSION: Acute mildly impacted basicervical fracture of the right femur. Questionable nondisplaced fracture of the right superior pubic ramus. No dislocation. Bones are demineralized. Right femoral fixation plate and screws incompletely imaged.   Extensive peripheral arterial calcifications.   US Abdomen Limited    Narrative    EXAM: US ABDOMEN LIMITED  LOCATION: [facility]  DATE/TIME: 1/23/2022 11:25 AM    INDICATION: Elevated LFTs.  COMPARISON: None.  TECHNIQUE: Limited abdominal ultrasound.    FINDINGS:    GALLBLADDER: Distended  gallbladder. Echogenic wall thickening and/or calcification noted which is nonspecific. No definite shadowing stones.    BILE DUCTS: Mild extrahepatic biliary dilatation. The common duct measures 8 mm.    LIVER: Increased echogenicity from diffuse fatty infiltration. No focal mass.    RIGHT KIDNEY: No hydronephrosis.    PANCREAS: Borderline prominent pancreatic duct at 3 mm.    No ascites.      Impression    IMPRESSION:  1.  Mild extrahepatic bile duct dilatation and borderline pancreatic ductal dilatation of uncertain etiology.    2.  Distended gallbladder without definite cholecystitis. Somewhat unusual focal thickening and/or calcification in the posterior gallbladder wall of uncertain etiology.    3.  Extensive fatty infiltration of the liver.       XR Surgery ARASELI Fluoro L/T 5 Min w Stills    Narrative    EXAM: XR SURGERY ARASELI FLUORO LESS THAN 5 MIN W STILLS  LOCATION: [facility]  DATE/TIME: 1/23/2022 3:00 PM    INDICATION: Intraoperative fluoroscopy taken during an orthopedic procedure.  COMPARISON: 1/23/2022.  TECHNIQUE: Exam performed by surgeon.    FINDINGS:    FLUOROSCOPIC TIME: 1.3  NUMBER OF IMAGES: 6.      Impression    IMPRESSION: Multiple fluoroscopic spot films of the hip taken during an ORIF of a femoral neck fracture with lateral plate and femoral neck screw fixation. Negative for intraoperative purposes. Please see the operative note for further details.   XR Lumbar Spine 2/3 Views    Narrative    EXAM: XR LUMBAR SPINE 2-3 VIEWS  LOCATION: [facility]  DATE/TIME: 1/24/2022 9:49 PM    INDICATION: Left lower extremity radiculopathy.  COMPARISON: None.  TECHNIQUE: CR Lumbar Spine.      Impression    IMPRESSION: Decreased osseous mineralization degrades evaluation. Subtle fractures cannot be excluded. The L4 and L5 vertebral body heights cannot be reliably assessed on the sagittal images. There is an age-indeterminate superior endplate compression    deformity noted involving the presumed L1 vertebral body with approximately 20% height loss. The L2 and L3 vertebral body heights are maintained. Alignment appears maintained. Multilevel degenerative changes are present. No definite acute extraspinal   abnormality. The patient is status post right-sided total hip arthroplasty. There is mild levoconvex curvature of the lumbar spine.   XR Femur Right 2 Views    Narrative    EXAM: XR FEMUR RIGHT 2 VIEW  LOCATION: [facility]  DATE/TIME: 1/25/2022 5:40 PM    INDICATION: Right leg postoperative follow-up.  COMPARISON: 1/23/2022.      Impression    IMPRESSION:  1.  ORIF right proximal femur fracture with lateral plate and femoral neck screw fixation. The orthopedic hardware is intact and the fracture is reduced. The femoral neck screw is within the lateral margin of the femoral neck.  2.  Plate and screw fixation of a healed distal femur fracture with lateral plate and screw fixation. Total knee arthroplasty.  3.  Mild right hip and sacroiliac degenerative arthrosis.  4.  Skin staples in the lateral thigh.   MR Lumbar Spine w/o Contrast    Narrative    EXAM: MR LUMBAR SPINE W/O CONTRAST  LOCATION: [facility]  DATE/TIME: 1/26/2022 7:54 PM    INDICATION: Low back pain, trauma  COMPARISON: X-ray 01/24/2022  TECHNIQUE: Routine Lumbar Spine MRI without IV contrast.    FINDINGS:   Nomenclature assumes 5 lumbar type vertebra and hypoplastic ribs at T12. Mild lumbar levocurvature. 2 to 3 mm retrolisthesis at L1-L2 and 2 mm anterolisthesis at L4-L5. Mild chronic superior plate depression at L1 consistent with findings on the previous   radiographs. Additional mild chronic superior endplate depression at L5. Mild Modic type one endplate edema at T10-T11 and T11-T12. Modic type II endplate signal changes superiorly at L5. Mild facet complex edema at L4-L5 and L5-S1. Normal distal spinal   cord and cauda equina with conus  medullaris at mid L2. Posterior paraspinal muscular atrophy. Osteoarthritic changes of the visualized sacroiliac joints.    T12-L1: Disc desiccation with minor loss of disc height. Tiny central disc extrusion. Mild facet arthropathy. No spinal canal or neural foraminal stenosis.     L1-L2: Mild to moderate loss of disc height and signal, greater on the right. Circumferential disc bulge and endplate spurring with asymmetric right foraminal and lateral disc osteophyte complex. Mild facet arthropathy. Mild narrowing of the right   lateral recess without central spinal canal stenosis. Moderate right neural foraminal stenosis. No left neural foraminal stenosis.    L2-L3: Disc desiccation with preserved disc height. Slight annular bulge without focal disc herniation. Mild to moderate facet arthropathy. Mildly mentum flavum thickening. No spinal canal or neural foraminal stenosis.     L3-L4: Disc desiccation with preserved disc height. Mild circumferential disc bulge. Moderate facet arthropathy and ligamentum flavum thickening. No spinal canal stenosis. Partial effacement of the inferior neural foramina without significant neural   foraminal stenosis.    L4-L5: Disc desiccation with preserved disc height. Broad-based posterior disc bulge and endplate spurring with asymmetric right foraminal and lateral disc osteophyte complex. Moderate to advanced facet arthropathy and ligamentum flavum thickening.   Asymmetric narrowing of the right lateral recess without central spinal canal stenosis. Mild right neural foraminal stenosis. No left neural foraminal stenosis.    L5-S1: Disc desiccation with preserved disc height. Slight annular bulge without focal disc herniation. Minor endplate spurring with asymmetric left lateral disc osteophyte complex. Moderate facet arthropathy. No spinal canal stenosis. Mild right neural   foraminal stenosis. No left neural foraminal stenosis.      Impression    IMPRESSION:  1.  Mild to moderate  multilevel lumbar spondylosis.  2.  Mild narrowing of the lateral recesses at several levels without high-grade central spinal canal stenosis.  3.  Moderate right neural foraminal stenosis at L1-L2. Mild right neural foraminal stenosis at L4-L5.  4.  Chronic superior endplate compression deformities of L1 and L5.   XR Knee Right 1/2 Views    Narrative    EXAM: XR KNEE RT 1 /2 VW  LOCATION: [facility]  DATE/TIME: 1/25/2022 5:50 PM    INDICATION: Right knee pain.  COMPARISON: None.      Impression    IMPRESSION:  1.  Right total knee arthroplasty with patellar resurfacing. The components are well seated without evidence of complication. Small joint effusion.  2.  ORIF healed distal femur fracture with lateral plate and screw fixation.  3.  Bone demineralization.  4.  Atherosclerotic calcification.   US Lower Extremity Venous Duplex Left    Narrative    US LOWER EXTREMITY VENOUS DUPLEX LEFT  1/26/2022 1:16 PM    CLINICAL HISTORY/INDICATION: left calf pain and tenderness    COMPARISON: None relevant    TECHNIQUE:   Grayscale, color-flow, and spectral waveform analysis were performed  of the deep veins of the left lower extremity    FINDINGS:   The left common femoral vein, femoral vein and popliteal vein  demonstrate normal compressibility, spectral waveform, color flow and  augmentation.    The left posterior tibial vein, peroneal vein, and greater saphenous  vein are compressible.    The contralateral right common femoral vein demonstrates normal  compressibility, spectral waveform, color flow and augmentation.    Anechoic structure in left popliteal fossa measuring 4.1 x 2.8 x 0.4  cm. Significant edema is seen in the left calf particularly  surrounding this area.      Impression    IMPRESSION:   1. No evidence of deep venous thrombosis in the left lower extremity.  2. Left Baker's cyst measuring 4.1 x 2.8 x 0.4 cm with surrounding  significant edema. May represent a ruptured Baker's  cyst.    DEVIN HOBBS MD         SYSTEM ID:  [id number]   MR Knee Left w/o Contrast    Narrative    EXAM: MR KNEE LEFT W/O CONTRAST  LOCATION: [facility]  DATE/TIME: 1/28/2022 7:05 PM    INDICATION: Acute left knee pain.  COMPARISON: None.  TECHNIQUE: Unenhanced.    FINDINGS:    MEDIAL COMPARTMENT:   -Meniscus: Normal.  -Cartilage: Partial-thickness cartilage fissuring with intrasubstance delamination over the central weightbearing femoral condyle measuring 9 x 9 mm. The tibial plateau cartilage is intact and well preserved.    LATERAL COMPARTMENT:  -Meniscus: Diffuse myxoid degeneration of the medial meniscus. High-grade, probable complete radial tear at the junction of the posterior body and posterior horn. The body of the meniscus is peripherally extruded into the lateral gutter. There is also   poorly defined degeneration and partial tearing of the anterior horn and root.  -Cartilage: Smooth full-thickness cartilage loss over the weightbearing lateral femoral condyle and tibial plateau.    PATELLOFEMORAL COMPARTMENT:   -Alignment: Patella midline. No subluxation or tilting.   -Cartilage: Normal.    CRUCIATE LIGAMENTS:   -ACL: Normal.  -PCL: Normal.    COLLATERAL LIGAMENTS:   -Medial collateral ligament: Intact with normal appearance.  -Lateral collateral ligament: Intact with normal appearance.    POSTEROMEDIAL CORNER:  -Distal semimembranosus tendon is normal.   -Pes anserine tendons are normal. Posteromedial corner complex ligaments are intact.    POSTEROLATERAL CORNER:   -Popliteal tendon is intact. No tendinopathy.  -Biceps femoris tendon and posterolateral corner complex ligaments are intact.    EXTENSOR MECHANISM:   -Quadriceps tendon: Mild distal tendinosis without tearing.  -Patellar tendon: Moderate proximal and distal tendinosis without tearing or acute tendinitis.  -Patellofemoral ligaments and retinacula: Intact.    JOINT:   -Small knee joint effusion. No loose bodies  visualized. Moderate-sized popliteal cyst.    BONES:  -Negative for fracture, contusion, AVN, or significant bone marrow edema. No bone marrow replacement lesion.  -Moderate size lateral femoral condyle and tibial plateau osteophytes. Tiny medial and patellofemoral compartment osteophytes.    SOFT TISSUES:   -Moderate nonspecific subcutaneous edema throughout the left leg, may represent lymphedema/volume overload.  -Moderate generalized atrophy of the musculature in the distal thigh and proximal calf. Increased T2 signal/edema within the muscles of the anterior compartment likely represents disuse or denervation change.  -Nerves and vessels are within normal limits for MRI.       Impression    IMPRESSION:  1.  End-stage degenerative arthritis in the lateral compartment, including myxoid degeneration and a high-grade radial tear of the lateral meniscus (likely complete), and broad areas of full-thickness cartilage loss over the femoral condyle and tibial   plateau. Large lateral femoral condyle and tibial plateau osteophytes are present.    2.  Small knee joint effusion, and moderate-sized popliteal cyst. No loose intra-articular bodies or significant synovitis visualized.    3.  Small area of grade III chondromalacia in the medial compartment of the femoral condyle. The medial meniscus is intact and is normal.    4.  Patellofemoral cartilage is intact, without significant arthritic changes.    5.  Ligaments and myotendinous structures are intact, without tearing or acute strain.    6.  Moderate generalized atrophy of the musculature in the left knee, suggesting chronic disuse or denervation change.    7.  Moderate nonspecific subcutaneous edema in the left knee. No focal fluid collection.   Echocardiogram Complete     Value    LVEF  50-55%    Narrative    [id number]  [id number]  [id number]  [id number]^JEAN-PIERRE^CHRISTINA^E     [facility]  Echocardiography Laboratory  [address] [place], [place] [id number]      Name: RENE RODRÍGUEZ  MRN: [id number]  : 1960  Study Date: 2022 08:34 AM  Age: 61 yrs  Gender: Female  Patient Location: [facility]  Reason For Study: Heart Failure  Ordering Physician: CHRISTINA MOREJON  Performed By: Lucita Carrera     BSA: 2.0 m2  Height: 64 in  Weight: 213 lb  HR: 90  BP: 168/86 mmHg  ______________________________________________________________________________  Procedure  Complete Portable Echo Adult. Optison (NDC #2308-1894) given intravenously.  ______________________________________________________________________________  Interpretation Summary     1. Left ventricular systolic function is low normal. The visual ejection  fraction is 50-55%.  2. No regional wall motion abnormalities noted.  3. The right ventricle is normal in structure, function and size.  4. There is mild (1+) mitral regurgitation.  5. Mild to moderate pulmonary hypertension; The right ventricular systolic  pressure is approximated at 45mmHg plus the right atrial pressure. IVC  diameter <2.1 cm collapsing >50% with sniff suggests a normal RA pressure of 3  mmHg.  ______________________________________________________________________________  Left Ventricle  The left ventricle is normal in size. There is mild concentric left  ventricular hypertrophy. The visual ejection fraction is 50-55%. Left  ventricular systolic function is low normal. Grade I or early diastolic  dysfunction. No regional wall motion abnormalities noted.     Right Ventricle  The right ventricle is normal in structure, function and size.     Atria  The left atrium is mildly dilated. Right atrial size is normal. There is no  color Doppler evidence of an atrial shunt.     Mitral Valve  There is mild mitral annular calcification. There is mild (1+) mitral  regurgitation.     Tricuspid Valve  The tricuspid valve is normal in structure and function. There is mild (1+)  tricuspid regurgitation. The right ventricular systolic pressure is  approximated  at 45mmHg plus the right atrial pressure.     Aortic Valve  The aortic valve is normal in structure and function.     Pulmonic Valve  The pulmonic valve is normal in structure and function. There is trace  pulmonic valvular regurgitation.     Vessels  Normal size aorta. IVC diameter <2.1 cm collapsing >50% with sniff suggests a  normal RA pressure of 3 mmHg.     Pericardium  There is no pericardial effusion.     Rhythm  Sinus rhythm was noted.  ______________________________________________________________________________  MMode/2D Measurements & Calculations     IVSd: 1.4 cm  LVIDd: 4.8 cm  LVIDs: 3.7 cm  LVPWd: 1.2 cm  FS: 24.0 %  LV mass(C)d: 251.0 grams  LV mass(C)dI: 124.9 grams/m2  Ao root diam: 3.3 cm  LA dimension: 4.4 cm  asc Aorta Diam: 3.5 cm  LA/Ao: 1.3  LA Volume (BP): 71.6 ml  LA Volume Index (BP): 35.6 ml/m2  RWT: 0.52     TAPSE: 2.6 cm     Doppler Measurements & Calculations  MV E max michelle: 76.7 cm/sec  MV A max michelle: 89.8 cm/sec  MV E/A: 0.85  MV dec time: 0.17 sec  TR max michelle: 334.2 cm/sec  TR max P.7 mmHg  E/E' av.3  Lateral E/e': 10.1  Medial E/e': 12.5     ______________________________________________________________________________  Report approved by: Betsy Bingham 2022 09:29 AM               Discharge Medications   Discharge Medication List as of 2022  2:45 PM      START taking these medications    Details   acetaminophen (TYLENOL) 325 MG tablet Take 3 tablets (975 mg) by mouth every 8 hours as needed for mild pain, Transitional      aspirin (ASA) 325 MG EC tablet Take 1 tablet (325 mg) by mouth daily, Disp-30 tablet, R-0, Transitional      cyclobenzaprine (FLEXERIL) 10 MG tablet Take 1 tablet (10 mg) by mouth 3 times daily, Historical      folic acid (FOLVITE) 1 MG tablet Take 1 tablet (1 mg) by mouth daily, Historical      hydrOXYzine (ATARAX) 25 MG tablet Take 1 tablet (25 mg) by mouth every 6 hours as needed for other or anxiety (adjuvant pain), Transitional       insulin glargine (LANTUS PEN) 100 UNIT/ML pen Inject 20 Units Subcutaneous every morning (before breakfast), Disp-15 mL, HistoricalIf Lantus is not covered by insurance, may substitute Basaglar or Semglee or other insulin glargine product per insurance preference at same dose and frequency.        methocarbamol (ROBAXIN) 500 MG tablet Take 1 tablet (500 mg) by mouth 4 times daily as needed for muscle spasms, Transitional      metoprolol succinate ER (TOPROL-XL) 25 MG 24 hr tablet Take 1 tablet (25 mg) by mouth daily, Historical      multivitamin w/minerals (THERA-VIT-M) tablet Take 1 tablet by mouth daily, Historical      oxyCODONE (ROXICODONE) 10 MG tablet Take 0.5-1 tablets (5-10 mg) by mouth every 4 hours as needed for severe pain (half tab for moderate pain (pain rating 4-6) whole tab for severe pain (pain rating 7-10)), Disp-25 tablet, R-0, Local Print      polyethylene glycol (MIRALAX) 17 g packet Take 17 g by mouth daily as needed for constipation, Transitional      vitamin D2 (ERGOCALCIFEROL) 12494 units (1250 mcg) capsule Take 1 capsule (50,000 Units) by mouth every 7 days, Historical         CONTINUE these medications which have CHANGED    Details   furosemide (LASIX) 40 MG tablet Take 1 tablet (40 mg) by mouth daily, Historical      losartan (COZAAR) 50 MG tablet Take 1 tablet (50 mg) by mouth daily, Historical      pantoprazole (PROTONIX) 40 MG EC tablet Take 1 tablet (40 mg) by mouth 2 times daily (before meals), Historical         CONTINUE these medications which have NOT CHANGED    Details   benzonatate (TESSALON) 100 MG capsule Take 100 mg by mouth 3 times daily as needed for cough, Historical      buPROPion (WELLBUTRIN SR) 200 MG 12 hr tablet Take 200 mg by mouth 2 times daily, Historical      dulaglutide (TRULICITY) 1.5 MG/0.5ML pen Inject 1.5 mg Subcutaneous every 7 days Mondays, Historical      hydrOXYzine (VISTARIL) 25 MG capsule Take 25 mg by mouth every 6 hours as needed for itching or  anxiety, Historical      naproxen sodium (ANAPROX) 220 MG tablet Take 220 mg by mouth 2 times daily as needed for moderate pain, Historical      traZODone (DESYREL) 100 MG tablet Take 100 mg by mouth nightly as needed for sleep, Historical      vitamin B-12 (CYANOCOBALAMIN) 250 MCG tablet Take 250 mcg by mouth daily, Historical      Vitamin D3 (CHOLECALCIFEROL) 125 MCG (5000 UT) tablet Take 125 mcg by mouth daily, Historical           Allergies   Allergies   Allergen Reactions     Latex

## 2022-02-03 ENCOUNTER — PATIENT OUTREACH (OUTPATIENT)
Dept: CARE COORDINATION | Facility: CLINIC | Age: 62
End: 2022-02-03
Payer: COMMERCIAL

## 2022-02-03 DIAGNOSIS — Z71.89 OTHER SPECIFIED COUNSELING: ICD-10-CM

## 2022-02-03 NOTE — PROGRESS NOTES
Clinic Care Coordination Contact    Background: Care Coordination referral placed from [facility] discharge report for reason of patient meeting criteria for a TCM outreach call by [facility] team.    Assessment: Upon chart review, [facility] Team member will cancel/close the referral for TCM outreach due to reason below:    Patient is not established within [facility]. Upon chart review, [facility] Team member noted patient discharged to TCU    Plan: Care Coordination referral for TCM outreach canceled.    Heaven Helm MA  [facility], [facility]

## 2022-02-21 ENCOUNTER — HOSPITAL ENCOUNTER (OUTPATIENT)
Facility: CLINIC | Age: 62
Setting detail: OBSERVATION
Discharge: NURSING, REHAB OR LONG-TERM CARE | End: 2022-02-25
Attending: EMERGENCY MEDICINE | Admitting: EMERGENCY MEDICINE
Payer: COMMERCIAL

## 2022-02-21 DIAGNOSIS — E11.9 TYPE 2 DIABETES MELLITUS WITHOUT COMPLICATION, UNSPECIFIED WHETHER LONG TERM INSULIN USE (H): Primary | ICD-10-CM

## 2022-02-21 DIAGNOSIS — S72.001A CLOSED FRACTURE OF RIGHT HIP, INITIAL ENCOUNTER (H): ICD-10-CM

## 2022-02-21 DIAGNOSIS — Z98.890 STATUS POST HIP SURGERY: ICD-10-CM

## 2022-02-21 LAB
ANION GAP SERPL CALCULATED.3IONS-SCNC: 6 MMOL/L (ref 3–14)
BASOPHILS # BLD AUTO: 0 10E3/UL (ref 0–0.2)
BASOPHILS NFR BLD AUTO: 1 %
BUN SERPL-MCNC: 16 MG/DL (ref 7–30)
CALCIUM SERPL-MCNC: 9.5 MG/DL (ref 8.5–10.1)
CHLORIDE BLD-SCNC: 92 MMOL/L (ref 94–109)
CO2 SERPL-SCNC: 30 MMOL/L (ref 20–32)
CREAT SERPL-MCNC: 1.06 MG/DL (ref 0.52–1.04)
EOSINOPHIL # BLD AUTO: 0.3 10E3/UL (ref 0–0.7)
EOSINOPHIL NFR BLD AUTO: 7 %
ERYTHROCYTE [DISTWIDTH] IN BLOOD BY AUTOMATED COUNT: 22.2 % (ref 10–15)
GFR SERPL CREATININE-BSD FRML MDRD: 59 ML/MIN/1.73M2
GLUCOSE BLD-MCNC: 359 MG/DL (ref 70–99)
GLUCOSE BLDC GLUCOMTR-MCNC: 348 MG/DL (ref 70–99)
HCT VFR BLD AUTO: 33.9 % (ref 35–47)
HGB BLD-MCNC: 9.6 G/DL (ref 11.7–15.7)
IMM GRANULOCYTES # BLD: 0 10E3/UL
IMM GRANULOCYTES NFR BLD: 0 %
LYMPHOCYTES # BLD AUTO: 1.4 10E3/UL (ref 0.8–5.3)
LYMPHOCYTES NFR BLD AUTO: 26 %
MCH RBC QN AUTO: 21.3 PG (ref 26.5–33)
MCHC RBC AUTO-ENTMCNC: 28.3 G/DL (ref 31.5–36.5)
MCV RBC AUTO: 75 FL (ref 78–100)
MONOCYTES # BLD AUTO: 0.5 10E3/UL (ref 0–1.3)
MONOCYTES NFR BLD AUTO: 10 %
NEUTROPHILS # BLD AUTO: 2.9 10E3/UL (ref 1.6–8.3)
NEUTROPHILS NFR BLD AUTO: 56 %
NRBC # BLD AUTO: 0 10E3/UL
NRBC BLD AUTO-RTO: 0 /100
PLATELET # BLD AUTO: 261 10E3/UL (ref 150–450)
POTASSIUM BLD-SCNC: 3.8 MMOL/L (ref 3.4–5.3)
RBC # BLD AUTO: 4.51 10E6/UL (ref 3.8–5.2)
SARS-COV-2 RNA RESP QL NAA+PROBE: NEGATIVE
SODIUM SERPL-SCNC: 128 MMOL/L (ref 133–144)
WBC # BLD AUTO: 5.2 10E3/UL (ref 4–11)

## 2022-02-21 PROCEDURE — 99220 PR INITIAL OBSERVATION CARE,LEVEL III: CPT | Performed by: STUDENT IN AN ORGANIZED HEALTH CARE EDUCATION/TRAINING PROGRAM

## 2022-02-21 PROCEDURE — G0378 HOSPITAL OBSERVATION PER HR: HCPCS

## 2022-02-21 PROCEDURE — 36415 COLL VENOUS BLD VENIPUNCTURE: CPT | Performed by: EMERGENCY MEDICINE

## 2022-02-21 PROCEDURE — 87635 SARS-COV-2 COVID-19 AMP PRB: CPT | Performed by: EMERGENCY MEDICINE

## 2022-02-21 PROCEDURE — 99285 EMERGENCY DEPT VISIT HI MDM: CPT | Mod: 25

## 2022-02-21 PROCEDURE — 96372 THER/PROPH/DIAG INJ SC/IM: CPT | Performed by: STUDENT IN AN ORGANIZED HEALTH CARE EDUCATION/TRAINING PROGRAM

## 2022-02-21 PROCEDURE — 85014 HEMATOCRIT: CPT | Performed by: EMERGENCY MEDICINE

## 2022-02-21 PROCEDURE — 80048 BASIC METABOLIC PNL TOTAL CA: CPT | Performed by: EMERGENCY MEDICINE

## 2022-02-21 PROCEDURE — 250N000013 HC RX MED GY IP 250 OP 250 PS 637: Performed by: STUDENT IN AN ORGANIZED HEALTH CARE EDUCATION/TRAINING PROGRAM

## 2022-02-21 PROCEDURE — 250N000012 HC RX MED GY IP 250 OP 636 PS 637: Performed by: STUDENT IN AN ORGANIZED HEALTH CARE EDUCATION/TRAINING PROGRAM

## 2022-02-21 PROCEDURE — 258N000003 HC RX IP 258 OP 636: Performed by: STUDENT IN AN ORGANIZED HEALTH CARE EDUCATION/TRAINING PROGRAM

## 2022-02-21 PROCEDURE — 82962 GLUCOSE BLOOD TEST: CPT

## 2022-02-21 PROCEDURE — C9803 HOPD COVID-19 SPEC COLLECT: HCPCS

## 2022-02-21 RX ORDER — TRAZODONE HYDROCHLORIDE 50 MG/1
100 TABLET, FILM COATED ORAL
Status: DISCONTINUED | OUTPATIENT
Start: 2022-02-21 | End: 2022-02-25 | Stop reason: HOSPADM

## 2022-02-21 RX ORDER — METHOCARBAMOL 500 MG/1
500 TABLET, FILM COATED ORAL 3 TIMES DAILY
Status: ON HOLD | COMMUNITY
End: 2022-02-25

## 2022-02-21 RX ORDER — PANTOPRAZOLE SODIUM 40 MG/1
40 TABLET, DELAYED RELEASE ORAL
Status: DISCONTINUED | OUTPATIENT
Start: 2022-02-21 | End: 2022-02-25 | Stop reason: HOSPADM

## 2022-02-21 RX ORDER — LIDOCAINE 40 MG/G
CREAM TOPICAL
Status: DISCONTINUED | OUTPATIENT
Start: 2022-02-21 | End: 2022-02-25 | Stop reason: HOSPADM

## 2022-02-21 RX ORDER — ACETAMINOPHEN 325 MG/1
975 TABLET ORAL EVERY 8 HOURS PRN
Status: DISCONTINUED | OUTPATIENT
Start: 2022-02-21 | End: 2022-02-25 | Stop reason: HOSPADM

## 2022-02-21 RX ORDER — METOPROLOL SUCCINATE 50 MG/1
50 TABLET, EXTENDED RELEASE ORAL DAILY
Status: DISCONTINUED | OUTPATIENT
Start: 2022-02-22 | End: 2022-02-25 | Stop reason: HOSPADM

## 2022-02-21 RX ORDER — SODIUM CHLORIDE 9 MG/ML
INJECTION, SOLUTION INTRAVENOUS CONTINUOUS
Status: DISCONTINUED | OUTPATIENT
Start: 2022-02-21 | End: 2022-02-22

## 2022-02-21 RX ORDER — MULTIPLE VITAMINS W/ MINERALS TAB 9MG-400MCG
1 TAB ORAL DAILY
Status: DISCONTINUED | OUTPATIENT
Start: 2022-02-21 | End: 2022-02-25 | Stop reason: HOSPADM

## 2022-02-21 RX ORDER — DEXTROSE MONOHYDRATE 25 G/50ML
25-50 INJECTION, SOLUTION INTRAVENOUS
Status: DISCONTINUED | OUTPATIENT
Start: 2022-02-21 | End: 2022-02-25 | Stop reason: HOSPADM

## 2022-02-21 RX ORDER — LOSARTAN POTASSIUM 50 MG/1
50 TABLET ORAL DAILY
Status: DISCONTINUED | OUTPATIENT
Start: 2022-02-22 | End: 2022-02-25 | Stop reason: HOSPADM

## 2022-02-21 RX ORDER — METHOCARBAMOL 500 MG/1
500 TABLET, FILM COATED ORAL 3 TIMES DAILY PRN
Status: ON HOLD | COMMUNITY
End: 2022-02-25

## 2022-02-21 RX ORDER — NICOTINE POLACRILEX 4 MG
15-30 LOZENGE BUCCAL
Status: DISCONTINUED | OUTPATIENT
Start: 2022-02-21 | End: 2022-02-25 | Stop reason: HOSPADM

## 2022-02-21 RX ORDER — OXYCODONE HYDROCHLORIDE 5 MG/1
5 TABLET ORAL EVERY 4 HOURS PRN
Status: DISCONTINUED | OUTPATIENT
Start: 2022-02-21 | End: 2022-02-25 | Stop reason: HOSPADM

## 2022-02-21 RX ORDER — CALCIUM CARBONATE 500 MG/1
1 TABLET, CHEWABLE ORAL 4 TIMES DAILY PRN
COMMUNITY

## 2022-02-21 RX ORDER — ONDANSETRON 2 MG/ML
4 INJECTION INTRAMUSCULAR; INTRAVENOUS EVERY 6 HOURS PRN
Status: DISCONTINUED | OUTPATIENT
Start: 2022-02-21 | End: 2022-02-25 | Stop reason: HOSPADM

## 2022-02-21 RX ORDER — BENZONATATE 100 MG/1
100 CAPSULE ORAL 3 TIMES DAILY PRN
Status: DISCONTINUED | OUTPATIENT
Start: 2022-02-21 | End: 2022-02-25 | Stop reason: HOSPADM

## 2022-02-21 RX ORDER — BUPROPION HYDROCHLORIDE 200 MG/1
200 TABLET, EXTENDED RELEASE ORAL 2 TIMES DAILY
Status: DISCONTINUED | OUTPATIENT
Start: 2022-02-21 | End: 2022-02-22

## 2022-02-21 RX ORDER — LIDOCAINE 4 G/G
2 PATCH TOPICAL EVERY 24 HOURS
COMMUNITY

## 2022-02-21 RX ORDER — METHOCARBAMOL 500 MG/1
500 TABLET, FILM COATED ORAL 3 TIMES DAILY PRN
Status: DISCONTINUED | OUTPATIENT
Start: 2022-02-21 | End: 2022-02-25 | Stop reason: HOSPADM

## 2022-02-21 RX ORDER — ONDANSETRON 4 MG/1
4 TABLET, ORALLY DISINTEGRATING ORAL EVERY 6 HOURS PRN
Status: DISCONTINUED | OUTPATIENT
Start: 2022-02-21 | End: 2022-02-25 | Stop reason: HOSPADM

## 2022-02-21 RX ADMIN — OXYCODONE HYDROCHLORIDE 5 MG: 5 TABLET ORAL at 20:12

## 2022-02-21 RX ADMIN — INSULIN ASPART 2 UNITS: 100 INJECTION, SOLUTION INTRAVENOUS; SUBCUTANEOUS at 22:59

## 2022-02-21 RX ADMIN — TRAZODONE HYDROCHLORIDE 100 MG: 50 TABLET ORAL at 23:35

## 2022-02-21 RX ADMIN — SODIUM CHLORIDE: 9 INJECTION, SOLUTION INTRAVENOUS at 20:13

## 2022-02-21 RX ADMIN — RIVAROXABAN 15 MG: 15 TABLET, FILM COATED ORAL at 20:50

## 2022-02-21 RX ADMIN — PANTOPRAZOLE SODIUM 40 MG: 40 TABLET, DELAYED RELEASE ORAL at 19:15

## 2022-02-21 ASSESSMENT — ENCOUNTER SYMPTOMS
SHORTNESS OF BREATH: 0
WEAKNESS: 1
DIARRHEA: 0
FEVER: 0
DYSURIA: 0

## 2022-02-21 NOTE — ED TRIAGE NOTES
Pt was in a rehab facility following fx of femoral head 1/23/22. Pt was released yesterday from rehab. At home pt unable to get around safely.

## 2022-02-21 NOTE — ED NOTES
[facility]  ED Nurse Handoff Report    ED Chief complaint: Generalized Weakness      ED Diagnosis:   Final diagnoses:   Status post hip surgery       Code Status: Full Code    Allergies:   Allergies   Allergen Reactions     Latex        Patient Story: pt presents 2 week out from right hip surgery was sent to a tcu and felt she was not being treated for her pain and trteated porly by staff. Pt left tcu on her own and was brought back to her home where she was not able to care for herself or complete ADLs. Pt is being followed by social work consult for placement after d/c    Focused Assessment:  Right hip pain     Treatments and/or interventions provided: social work consult   Patient's response to treatments and/or interventions: therapeutic     To be done/followed up on inpatient unit:   Social work consult     Does this patient have any cognitive concerns?: na    Activity level - Baseline/Home:  Stand with assist x2  Activity Level - Current:   Stand with assist x2 and Walker    Patient's Preferred language: English   Needed?: No    Isolation: None  Infection: Not Applicable  Patient tested for COVID 19 prior to admission: YES  Bariatric?: No    Vital Signs:   Vitals:    02/21/22 1417 02/21/22 1500   BP: (!) 171/83 (!) 150/68   Pulse: 81 77   Resp: 16    Temp: 97.6  F (36.4  C)    TempSrc: Oral    SpO2: 96% 99%       Cardiac Rhythm:     Was the PSS-3 completed:   Yes  What interventions are required if any?               Family Comments:   OBS brochure/video discussed/provided to patient/family: N/A              Name of person given brochure if not patient:               Relationship to patient:     For the majority of the shift this patient's behavior was Green.   Behavioral interventions performed were none.    ED NURSE PHONE NUMBER: RN 7

## 2022-02-21 NOTE — ED PROVIDER NOTES
History     Chief Complaint:  Generalized Weakness    The history is provided by the patient.      Lamar Menendez is a 61 year old female on Xarelto, with history of type II diabetes, bilateral pleural effusion, and hypertension who presents with generalized weakness. The patient reports living at [facility] for the past twenty days after fracturing right femoral head, but was sent home yesterday. Prior to her discharge, staff walked with her, which she recounts having difficulty with. She did not feel as though she was ready to return home and care adequately for herself. Last night, Lamar had urinary incontinence on her way to the bathroom in the middle of the night; she uses a walker. Lamar also recounts developing a blood clot in her right calf while at [facility], confirmed by ultrasound. She was on Xarelto while at [facility], but has not taken any for the past two day; She is currently trying to figure this out with pharmacy. Patient denies any fevers, chest pain, shortness of breath, diarrhea, or dysuria.    Review of Systems   Constitutional: Negative for fever.   Respiratory: Negative for shortness of breath.    Cardiovascular: Negative for chest pain.   Gastrointestinal: Negative for diarrhea.   Genitourinary: Negative for dysuria.        + urinary incontinence    Neurological: Positive for weakness (generalized).   All other systems reviewed and are negative.    Allergies:  Latex    Medications:    Ecotrin  Desyrel   Tessalon  Wellbutrin  Flexeril  Trulicity  Flovite  Lasix  Atarax  Vistaril  Lantus Pen  Cozaar  Robaxin  Toprol  Anaprox  Roxicodone  Protonix  Miralax  Cyanocobalamin    Past Medical History:    Type II diabetes  Heart disease  Hypertension  Right hip fracture  Bacteremia due to Klebsiella pneumoniae   Endocarditis  GERD  PTSD  RANI  Depression  Anxiety  Esophagitis with anastomotic ulcer  Pleural effusion, bilateral  Odynophagia     Past Surgical History:    ORIF hip nailing,  right  Gastric bypass   Chest tube placement, left   Orthopedic surgery     Family History:    Father: Heart disease, MI  Mother: lung cancer     Social History:  The patient presents to the ED alone.     Physical Exam     Patient Vitals for the past 24 hrs:   BP Temp Temp src Pulse Resp SpO2   02/21/22 1500 (!) 150/68 -- -- 77 -- 99 %   02/21/22 1417 (!) 171/83 97.6  F (36.4  C) Oral 81 16 96 %       Physical Exam  Constitutional: Heavy set white female, supine. No respiratory distress.   HENT: No signs of trauma.   Eyes: EOM are normal. Pupils are equal, round, and reactive to light.   Neck: Normal range of motion. No JVD present. No cervical adenopathy.  Cardiovascular: Regular rhythm.  Exam reveals no gallop and no friction rub.    No murmur heard.  Pulmonary/Chest: Bilateral breath sounds normal. No wheezes, rhonchi or rales.  Abdominal: Soft. No tenderness. No rebound or guarding.   Musculoskeletal: No edema. No tenderness of calf. Healing lateral right hip incision. Unable to lift right leg off bed on her own.   Lymphadenopathy: No lymphadenopathy.   Neurological: Alert and oriented to person, place, and time. Coordination normal. Unable to lift right leg off bed on her own.   Skin: Skin is warm and dry. No rash noted. No erythema.     Emergency Department Course     Laboratory:  Labs Ordered and Resulted from Time of ED Arrival to Time of ED Departure   BASIC METABOLIC PANEL - Abnormal       Result Value    Sodium 128 (*)     Potassium 3.8      Chloride 92 (*)     Carbon Dioxide (CO2) 30      Anion Gap 6      Urea Nitrogen 16      Creatinine 1.06 (*)     Calcium 9.5      Glucose 359 (*)     GFR Estimate 59 (*)    CBC WITH PLATELETS AND DIFFERENTIAL - Abnormal    WBC Count 5.2      RBC Count 4.51      Hemoglobin 9.6 (*)     Hematocrit 33.9 (*)     MCV 75 (*)     MCH 21.3 (*)     MCHC 28.3 (*)     RDW 22.2 (*)     Platelet Count 261      % Neutrophils 56      % Lymphocytes 26      % Monocytes 10      %  Eosinophils 7      % Basophils 1      % Immature Granulocytes 0      NRBCs per 100 WBC 0      Absolute Neutrophils 2.9      Absolute Lymphocytes 1.4      Absolute Monocytes 0.5      Absolute Eosinophils 0.3      Absolute Basophils 0.0      Absolute Immature Granulocytes 0.0      Absolute NRBCs 0.0     COVID-19 VIRUS (CORONAVIRUS) BY PCR - Normal    SARS CoV2 PCR Negative       Emergency Department Course:    Mental Health Risk Assessment      PSS-3    Date and Time Over the past 2 weeks have you felt down, depressed, or hopeless? Over the past 2 weeks have you had thoughts of killing yourself? Have you ever attempted to kill yourself? When did this last happen? User   02/21/22 1418 yes yes yes more than 6 months ago CELINA      C-SSRS ([place])    Date and Time Q1 Wished to be Dead (Past Month) Q2 Suicidal Thoughts (Past Month) Q3 Suicidal Thought Method Q4 Suicidal Intent without Specific Plan Q5 Suicide Intent with Specific Plan Q6 Suicide Behavior (Lifetime) Within the Past 3 Months? RETIRED: Level of Risk per Screen Screening Not Complete User   02/21/22 1418 yes no no no no no -- -- -- CELINA                  Reviewed:  I reviewed nursing notes, vitals, past history and care everywhere    Assessments:  1450 I obtained history and examined the patient as noted above.   1550 I rechecked the patient and explained findings. Prepared for admission.      Consults:   0309 I spoke with Dr. Dominguez, Hospitalist, regarding the patient, who agreed to admit the patient.        Disposition:  The patient was admitted to the hospital under the care of Dr. Dominguez.    Impression & Plan         Medical Decision Making:  Lamar Menendez is a 61 year old female who about three weeks ago broke her right hip and required surgery. She went to [facility] for rehab, and was discharged from there yesterday. She states that while at [facility], she did develop a small clot in her right calf and was started on blood thinners. However, when  discharged, she did not receive this medicine to use at home. The patient states that she tried to get up and walk today and could not even make it to the bathroom with her walker. She does not feel as though she can adequately care for herself, and that she was discharged too early. She denies fever, headaches, chest pain, shortness of breath, abdominal pain, diarrhea, or urinary symptoms. On exam, there is a nice surgical scar which is healing well without redness or drainage. However when I ask the patient to lift her right leg, she cannot. I am able to rotate the hip however, and flex and extend it. Basic labs were obtained. I have talked to social work who is unable to get her back to a rehab facility now, but recommends she comes in for observation and physical therapy observation.     C    Diagnosis:    ICD-10-CM    1. Status post hip surgery  Z98.890      Scribe Disclosure:  I, Joao Marcostrinidad, am serving as a scribe at 2:50 PM on 2/21/2022 to document services personally performed by Brandon Tomas MD based on my observations and the provider's statements to me.      Brandon Tomas MD  02/21/22 4653

## 2022-02-21 NOTE — ED NOTES
Bed: ED14  Expected date:   Expected time:   Means of arrival:   Comments:   61 F weakness post hip surgery ETA [id number]

## 2022-02-21 NOTE — PHARMACY-ADMISSION MEDICATION HISTORY
Pharmacy Medication History  Admission medication history interview status for the 2/21/2022  admission is complete. See EPIC admission navigator for prior to admission medications     Location of Interview: Patient room  Medication history sources: Patient, Surescripts and Care Everywhere    Significant changes made to the medication list:  Added Xarelto and Lidocaine patch.    In the past week, patient estimated taking medication this percent of the time: greater than 90%    Additional medication history information:   none    Medication reconciliation completed by provider prior to medication history? No    Time spent in this activity: 45 minutes    Prior to Admission medications    Medication Sig Last Dose Taking? Auth Provider   acetaminophen (TYLENOL) 325 MG tablet Take 3 tablets (975 mg) by mouth every 8 hours as needed for mild pain as needed Yes Carolyne Pierce PA-C   aspirin (ASA) 325 MG EC tablet Take 1 tablet (325 mg) by mouth daily 2/20/2022 at Unknown time Yes Carolyne Pierce PA-C   benzonatate (TESSALON) 100 MG capsule Take 100 mg by mouth 3 times daily as needed for cough as needed Yes Unknown, Entered By History   buPROPion (WELLBUTRIN SR) 200 MG 12 hr tablet Take 200 mg by mouth 2 times daily 2/20/2022 at Unknown time Yes Unknown, Entered By History   calcium carbonate (TUMS) 500 MG chewable tablet Take 1 chew tab by mouth 4 times daily as needed for heartburn as needed Yes Unknown, Entered By History   folic acid (FOLVITE) 1 MG tablet Take 1 tablet (1 mg) by mouth daily 2/20/2022 at Unknown time Yes Cassidy Joyner MD   furosemide (LASIX) 40 MG tablet Take 1 tablet (40 mg) by mouth daily 2/20/2022 at Unknown time Yes Cassidy Joyner MD   hydrOXYzine (ATARAX) 25 MG tablet Take 1 tablet (25 mg) by mouth every 6 hours as needed for other or anxiety (adjuvant pain) as needed Yes Carolyne Pierce PA-C   Lidocaine (LIDOCARE) 4 % Patch Place 2 patches onto the skin every 24  hours To prevent lidocaine toxicity, patient should be patch free for 12 hrs daily. Past Week at Unknown time Yes Unknown, Entered By History   losartan (COZAAR) 50 MG tablet Take 1 tablet (50 mg) by mouth daily 2/20/2022 at Unknown time Yes Cassidy Joyner MD   methocarbamol (ROBAXIN) 500 MG tablet Take 500 mg by mouth 3 times daily 2/20/2022 at Unknown time Yes Unknown, Entered By History   methocarbamol (ROBAXIN) 500 MG tablet Take 500 mg by mouth 3 times daily as needed for muscle spasms as needed Yes Unknown, Entered By History   metoprolol succinate ER (TOPROL-XL) 25 MG 24 hr tablet Take 50 mg by mouth daily  2/20/2022 at Unknown time Yes Cassidy Joyner MD   multivitamin w/minerals (THERA-VIT-M) tablet Take 1 tablet by mouth daily 2/20/2022 at Unknown time Yes Cassidy Joyner MD   oxyCODONE (ROXICODONE) 10 MG tablet Take 0.5-1 tablets (5-10 mg) by mouth every 4 hours as needed for severe pain (half tab for moderate pain (pain rating 4-6) whole tab for severe pain (pain rating 7-10)) 2/21/2022 at 0300 Yes Carolyne Pierce PA-C   pantoprazole (PROTONIX) 40 MG EC tablet Take 1 tablet (40 mg) by mouth 2 times daily (before meals) 2/20/2022 at Unknown time Yes Cassidy Joyner MD   polyethylene glycol (MIRALAX) 17 g packet Take 17 g by mouth daily as needed for constipation 2/20/2022 at Unknown time Yes Carolyne Pierce PA-C   rivaroxaban ANTICOAGULANT (XARELTO) 15 MG TABS tablet Take 15 mg by mouth 2 times daily (with meals) To start 20 mg daily on 3/2/22 2/20/2022 at Unknown time Yes Unknown, Entered By History   traZODone (DESYREL) 100 MG tablet Take 100 mg by mouth nightly as needed for sleep as needed Yes Unknown, Entered By History   vitamin B-12 (CYANOCOBALAMIN) 250 MCG tablet Take 250 mcg by mouth daily 2/20/2022 at Unknown time Yes Unknown, Entered By History   vitamin D2 (ERGOCALCIFEROL) 49775 units (1250 mcg) capsule Take 1 capsule (50,000 Units) by mouth every 7 days Past  Week at Unknown time Yes Cassidy Joyner MD   dulaglutide (TRULICITY) 1.5 MG/0.5ML pen Inject 1.5 mg Subcutaneous every 7 days Mondays 2/14/2022  Unknown, Entered By History       The information provided in this note is only as accurate as the sources available at the time of update(s)

## 2022-02-22 ENCOUNTER — APPOINTMENT (OUTPATIENT)
Dept: PHYSICAL THERAPY | Facility: CLINIC | Age: 62
End: 2022-02-22
Attending: STUDENT IN AN ORGANIZED HEALTH CARE EDUCATION/TRAINING PROGRAM
Payer: COMMERCIAL

## 2022-02-22 LAB
ANION GAP SERPL CALCULATED.3IONS-SCNC: 6 MMOL/L (ref 3–14)
BUN SERPL-MCNC: 12 MG/DL (ref 7–30)
CALCIUM SERPL-MCNC: 8.6 MG/DL (ref 8.5–10.1)
CHLORIDE BLD-SCNC: 100 MMOL/L (ref 94–109)
CO2 SERPL-SCNC: 26 MMOL/L (ref 20–32)
CREAT SERPL-MCNC: 0.85 MG/DL (ref 0.52–1.04)
ERYTHROCYTE [DISTWIDTH] IN BLOOD BY AUTOMATED COUNT: 22.3 % (ref 10–15)
GFR SERPL CREATININE-BSD FRML MDRD: 77 ML/MIN/1.73M2
GLUCOSE BLD-MCNC: 274 MG/DL (ref 70–99)
GLUCOSE BLDC GLUCOMTR-MCNC: 134 MG/DL (ref 70–99)
GLUCOSE BLDC GLUCOMTR-MCNC: 220 MG/DL (ref 70–99)
GLUCOSE BLDC GLUCOMTR-MCNC: 251 MG/DL (ref 70–99)
GLUCOSE BLDC GLUCOMTR-MCNC: 265 MG/DL (ref 70–99)
GLUCOSE BLDC GLUCOMTR-MCNC: 295 MG/DL (ref 70–99)
HCT VFR BLD AUTO: 30 % (ref 35–47)
HGB BLD-MCNC: 8.9 G/DL (ref 11.7–15.7)
MCH RBC QN AUTO: 22 PG (ref 26.5–33)
MCHC RBC AUTO-ENTMCNC: 29.7 G/DL (ref 31.5–36.5)
MCV RBC AUTO: 74 FL (ref 78–100)
PLATELET # BLD AUTO: 247 10E3/UL (ref 150–450)
POTASSIUM BLD-SCNC: 3.6 MMOL/L (ref 3.4–5.3)
RBC # BLD AUTO: 4.04 10E6/UL (ref 3.8–5.2)
SODIUM SERPL-SCNC: 132 MMOL/L (ref 133–144)
WBC # BLD AUTO: 5.1 10E3/UL (ref 4–11)

## 2022-02-22 PROCEDURE — 258N000003 HC RX IP 258 OP 636: Performed by: STUDENT IN AN ORGANIZED HEALTH CARE EDUCATION/TRAINING PROGRAM

## 2022-02-22 PROCEDURE — 85027 COMPLETE CBC AUTOMATED: CPT | Performed by: PHYSICIAN ASSISTANT

## 2022-02-22 PROCEDURE — 36415 COLL VENOUS BLD VENIPUNCTURE: CPT | Performed by: PHYSICIAN ASSISTANT

## 2022-02-22 PROCEDURE — 97530 THERAPEUTIC ACTIVITIES: CPT | Mod: GP

## 2022-02-22 PROCEDURE — 250N000013 HC RX MED GY IP 250 OP 250 PS 637: Performed by: PHYSICIAN ASSISTANT

## 2022-02-22 PROCEDURE — 250N000012 HC RX MED GY IP 250 OP 636 PS 637: Performed by: PHYSICIAN ASSISTANT

## 2022-02-22 PROCEDURE — 99226 PR SUBSEQUENT OBSERVATION CARE,LEVEL III: CPT | Performed by: PHYSICIAN ASSISTANT

## 2022-02-22 PROCEDURE — 80048 BASIC METABOLIC PNL TOTAL CA: CPT | Performed by: PHYSICIAN ASSISTANT

## 2022-02-22 PROCEDURE — 96372 THER/PROPH/DIAG INJ SC/IM: CPT | Performed by: PHYSICIAN ASSISTANT

## 2022-02-22 PROCEDURE — 82962 GLUCOSE BLOOD TEST: CPT

## 2022-02-22 PROCEDURE — 250N000013 HC RX MED GY IP 250 OP 250 PS 637: Performed by: STUDENT IN AN ORGANIZED HEALTH CARE EDUCATION/TRAINING PROGRAM

## 2022-02-22 PROCEDURE — 97161 PT EVAL LOW COMPLEX 20 MIN: CPT | Mod: GP

## 2022-02-22 PROCEDURE — G0378 HOSPITAL OBSERVATION PER HR: HCPCS

## 2022-02-22 PROCEDURE — 97116 GAIT TRAINING THERAPY: CPT | Mod: GP

## 2022-02-22 RX ORDER — AMOXICILLIN 250 MG
1-2 CAPSULE ORAL 2 TIMES DAILY PRN
Status: DISCONTINUED | OUTPATIENT
Start: 2022-02-22 | End: 2022-02-24

## 2022-02-22 RX ORDER — LIDOCAINE 4 G/G
1 PATCH TOPICAL
Status: DISCONTINUED | OUTPATIENT
Start: 2022-02-22 | End: 2022-02-25 | Stop reason: HOSPADM

## 2022-02-22 RX ORDER — POLYETHYLENE GLYCOL 3350 17 G/17G
17 POWDER, FOR SOLUTION ORAL DAILY
Status: DISCONTINUED | OUTPATIENT
Start: 2022-02-22 | End: 2022-02-24

## 2022-02-22 RX ORDER — HYDROXYZINE HYDROCHLORIDE 25 MG/1
25 TABLET, FILM COATED ORAL EVERY 6 HOURS PRN
Status: DISCONTINUED | OUTPATIENT
Start: 2022-02-22 | End: 2022-02-25 | Stop reason: HOSPADM

## 2022-02-22 RX ORDER — BUPROPION HYDROCHLORIDE 200 MG/1
200 TABLET, EXTENDED RELEASE ORAL 2 TIMES DAILY
Status: DISCONTINUED | OUTPATIENT
Start: 2022-02-22 | End: 2022-02-25 | Stop reason: HOSPADM

## 2022-02-22 RX ORDER — FUROSEMIDE 40 MG
40 TABLET ORAL DAILY
Status: DISCONTINUED | OUTPATIENT
Start: 2022-02-22 | End: 2022-02-25 | Stop reason: HOSPADM

## 2022-02-22 RX ADMIN — LOSARTAN POTASSIUM 50 MG: 50 TABLET, FILM COATED ORAL at 08:37

## 2022-02-22 RX ADMIN — LIDOCAINE 1 PATCH: 246 PATCH TOPICAL at 09:00

## 2022-02-22 RX ADMIN — INSULIN GLARGINE 10 UNITS: 100 INJECTION, SOLUTION SUBCUTANEOUS at 14:01

## 2022-02-22 RX ADMIN — RIVAROXABAN 15 MG: 15 TABLET, FILM COATED ORAL at 08:48

## 2022-02-22 RX ADMIN — METHOCARBAMOL 500 MG: 500 TABLET ORAL at 17:33

## 2022-02-22 RX ADMIN — POLYETHYLENE GLYCOL 3350 17 G: 17 POWDER, FOR SOLUTION ORAL at 13:13

## 2022-02-22 RX ADMIN — BUPROPION HYDROCHLORIDE 200 MG: 200 TABLET, FILM COATED, EXTENDED RELEASE ORAL at 13:13

## 2022-02-22 RX ADMIN — METHOCARBAMOL 500 MG: 500 TABLET ORAL at 09:00

## 2022-02-22 RX ADMIN — OXYCODONE HYDROCHLORIDE 5 MG: 5 TABLET ORAL at 06:46

## 2022-02-22 RX ADMIN — METOPROLOL SUCCINATE 50 MG: 50 TABLET, EXTENDED RELEASE ORAL at 08:37

## 2022-02-22 RX ADMIN — OXYCODONE HYDROCHLORIDE 5 MG: 5 TABLET ORAL at 22:35

## 2022-02-22 RX ADMIN — OXYCODONE HYDROCHLORIDE 5 MG: 5 TABLET ORAL at 14:38

## 2022-02-22 RX ADMIN — BUPROPION HYDROCHLORIDE 200 MG: 200 TABLET, EXTENDED RELEASE ORAL at 08:29

## 2022-02-22 RX ADMIN — RIVAROXABAN 15 MG: 15 TABLET, FILM COATED ORAL at 17:25

## 2022-02-22 RX ADMIN — PANTOPRAZOLE SODIUM 40 MG: 40 TABLET, DELAYED RELEASE ORAL at 17:24

## 2022-02-22 RX ADMIN — SODIUM CHLORIDE: 9 INJECTION, SOLUTION INTRAVENOUS at 05:13

## 2022-02-22 RX ADMIN — PANTOPRAZOLE SODIUM 40 MG: 40 TABLET, DELAYED RELEASE ORAL at 06:33

## 2022-02-22 RX ADMIN — FUROSEMIDE 40 MG: 40 TABLET ORAL at 13:13

## 2022-02-22 RX ADMIN — ACETAMINOPHEN 975 MG: 325 TABLET, FILM COATED ORAL at 08:59

## 2022-02-22 NOTE — PLAN OF CARE
Summary: status post hip surgery  Date & Time: 2/22/22 5729-9850   Orientation: A&OX4  Observation goals: not met   Activity Level: A2- GB/W to bedside commode   Fall Risk: yes   Behavior & Aggression: green  Pain Management: R hip pain controlled with ice packs, lidocaine patch, Robaxin x1, Tylenol x1, Oxy x1  ABNL VS/O2: VSS RA   ABNL Lab/BG: BG 2  Diet: Reg, carb counting   Bowel/Bladder: continent. 1 BM this shift   Drains/Devices: PIV SL   Tests/Procedures: NA  Anticipated  DC Date: pending TCU placement  Other Important Info: PT/OT/SW following.        Observation goals  PRIOR TO DISCHARGE       Comments: -diagnostic tests and consults completed and resulted: not met, PT, OT and SW to consult  -vital signs normal or at patient baseline: met   -tolerating oral intake to maintain hydration: met   -adequate pain control on oral analgesics: partially met   -returns to baseline functional status: not met   -safe disposition plan has been identified: not met   Nurse to notify provider when observation goals have been met and patient is ready for discharge.

## 2022-02-22 NOTE — PROVIDER NOTIFICATION
"MD Notification    Notified Person: MD    Notified Person Name: Meri Bateman    Notification Date/Time: 02/22/22  11:12 AM    Notification Interaction: Vocera     Purpose of Notification: \"FYI: Pt's AM labs. Hgb 8.9\"    Orders Received:    Comments:    "

## 2022-02-22 NOTE — PROGRESS NOTES
Observation goals  PRIOR TO DISCHARGE       Comments: -diagnostic tests and consults completed and resulted: not met, PT and SW to consult  -vital signs normal or at patient baseline: met   -tolerating oral intake to maintain hydration: met   -adequate pain control on oral analgesics: partially met   -returns to baseline functional status: not met   -safe disposition plan has been identified: not met   Nurse to notify provider when observation goals have been met and patient is ready for discharge.

## 2022-02-22 NOTE — PROGRESS NOTES
Orientation: a/o x 4  Observation Goals (met & not met): not met  Activity Level: GB, walker, 2 assist  Fall Risk: yes  Behavior & Aggression Tool Color: green  Pain Management: oxycodone  ABNL VS/O2: stable   ABNL Lab/BG:   Diet: regular  Bowel/Bladder: BS commode  Drains/Devices: no  Tests/Procedures for next shift: na  Anticipated DC date: pending   Other Important Info:

## 2022-02-22 NOTE — PROGRESS NOTES
-diagnostic tests and consults completed and resulted   -vital signs normal or at patient baseline not met  -tolerating oral intake to maintain hydration met  -adequate pain control on oral analgesics not met  -returns to baseline functional status not met  -safe disposition plan has been identified not met  Nurse to notify provider when observation goals have been met and patient is ready for discharge.

## 2022-02-22 NOTE — PLAN OF CARE
[facility]      OUTPATIENT PHYSICAL THERAPY EVALUATION  PLAN OF TREATMENT FOR OUTPATIENT REHABILITATION  (COMPLETE FOR INITIAL CLAIMS ONLY)  Patient's Last Name, First Name, M.I.  YOB: 1960  Lamar Menendez                           Provider's Name  [facility] Medical Record No.  [id number]                               Onset Date:  02/21/22   Start of Care Date:  02/22/22      Type:     _X_PT   ___OT   ___SLP Medical Diagnosis:  impaired gait and generalized weakness                        PT Diagnosis:  impaired gait, generalized weakness   Visits from SOC:  1   _________________________________________________________________________________  Plan of Treatment/Functional Goals    Planned Interventions: balance training, bed mobility training, cryotherapy, gait training, home exercise program, patient/family education, strengthening, stretching, transfer training     Goals: See Physical Therapy Goals on Care Plan in [facility] electronic health record.    Therapy Frequency: 5x/week  Predicted Duration of Therapy Intervention: 03/01/22  _________________________________________________________________________________    I CERTIFY THE NEED FOR THESE SERVICES FURNISHED UNDER        THIS PLAN OF TREATMENT AND WHILE UNDER MY CARE     (Physician co-signature of this document indicates review and certification of the therapy plan).                Certification date from: 02/22/22, Certification date to: 03/04/22    Referring Physician: Radhames Dominguez MD            Initial Assessment        See Physical Therapy evaluation dated 02/22/22 in Epic electronic health record.Goal Outcome Evaluation:    Plan of Care Reviewed With: patient

## 2022-02-22 NOTE — PLAN OF CARE
Occupational Therapy: Orders received. Chart reviewed and discussed with care team.? Occupational Therapy not indicated due to pt being below baseline and not having any IP OT needs.  Plan is to have pt return to TCU for further rehab.  Recommend OT evaluation at treat at TCU.  If this changes please write OT restart orders.  PT handling IP mobility needs at this time.? Defer discharge recommendations to rehab team.? Will complete orders.

## 2022-02-22 NOTE — H&P
[facility]    History and Physical - Hospitalist Service       Date of Admission:  2/21/2022    Assessment & Plan      Lamar Menendez is a 61 year old female admitted on 2/21/2022. She presents with generalized weakness.      Generalized Weakness   Acute, mildly impacted, basicervical fracture of right femur secondary to mechanical fall, s/p open reduction and internal fixation using hip screw, 1/23/2022   hyponatremia  Assessment: Presents with generalized weakness in the setting of a recent right femoral head fracture.  Patient resides independently, ambulates with a walker, but reports that she has significant generalized weakness and unable to care for self.  She would like to seek placement.  Suspect that her hyponatremia of 128 secondary to poor p.o. intake  Plan:  -Admit observation  -PT/social work consulted  -Pain control as needed  -Continue prior to mission aspirin  -Fall precautions  -Will need TCU  -Recheck BMP in a.m.     Complete lateral meniscal tear of left knee   Assessment: last admission report significant left calf and left posterior thigh pain since her fall.  Unable to bear weight during.  No fractures US negative for DVT but showed likely ruptured baker's cyst. MRI of the knee showed high-grade likely complete lateral meniscal tear, full-thickness cartilage loss over femoral condyle and tibial plateau  Plan:  -Continue PT      Chronic iron deficiency anemia  Assessment: Hemoglobin 9.6 on admission and stable. Last admission was assessed by GI-does not appear to have any GI bleed.  EGD not recommended  Plan:  - Monitor      Esophagitis and anastomotic ulcer  -EGD 2018 showed anastomotic ulcer  -On PPI, continue Protonix.  -Avoid NSAIDs      S/p Kavon-en-Y gastric bypass. Follow as outpatient      Diabetes mellitus type 2, without long-term use of insulin, uncontrolled with most recent hemoglobin A1c 10.4, with hyperglycemia on admission, blood sugar over  "400  Assessment: PTA on Trulicity   Plan:  - hold PTA Trulicity  - sliding scale insulin as needed       Alcohol use disorder     Alcoholic hepatitis  Assessment: Longstanding history of alcohol use.  Used to drink heavily until 2009, then quit for 12 years after inpatient treatment. Relapsed few months ago and now drinks 2-3 drinks per day.  She is not interested in quitting alcohol  Plan:  - Encourage alcohol cessation going forward     Diastolic CHF exacerbation with preserved EF of 55-55%   Assessment: recent Echo also showed LVH, grade 1 diastolic dysfunction, increased PA pressure 45 mmHg   Plan:   -Outpatient sleep study recommended for suspected RANI   -Hold Lasix 40 mg daily due to hyponatremia      Essential hypertension-  Assessment: Blood pressure mildly elevated.  Losartan increased to 50 mg daily, continue   Plan:  - Also started on Toprol-XL 25 mg, continue      Vitamin D deficiency-recent vitamin D low at 8   Continue ergocalciferol 50,000 units weekly at discharge      Hyperlipidemia-recent lipid panel showed , HDL 84, triglycerides 106      Chronic major depression   - reports chronic hopelessness. No suicidal ideation   - on wellbutrin, will continue        Diet: Regular Diet Adult    DVT Prophylaxis: Pneumatic Compression Devices  Stanford Catheter: Not present  Central Lines: None  Cardiac Monitoring: None  Code Status: No CPR- Do NOT Intubate      Clinically Significant Risk Factors Present on Admission         # Hyponatremia: Na = 128 mmol/L (Ref range: 133 - 144 mmol/L) on admission, will monitor as appropriate      # Coagulation Defect: home medication list includes an anticoagulant medication  # Platelet Defect: home medication list includes an antiplatelet medication   # Diabetes, type II: last A1C 9.4 % (Ref range: 0.0 - 5.6 %)  # Obesity: Estimated body mass index is 36.71 kg/m  as calculated from the following:    Height as of 1/23/22: 1.626 m (5' 4\").    Weight as of 1/23/22: 97 kg " (213 lb 13.5 oz).      Disposition Plan   Expected Discharge:    Anticipated discharge location:  Awaiting care coordination huddle  Delays:            The patient's care was discussed with the Patient and ED Provider.    Radhames Dominguez MD  Hospitalist Service  [facility]  Securely message with the Vocera Web Console (learn more here)  Text page via [facility] Paging/Directory         ______________________________________________________________________    Chief Complaint     Generalized Weakness    History is obtained from the patient    History of Present Illness      Lamar Menendez is a 61 year old female on Xarelto, with history of type II diabetes, bilateral pleural effusion, and hypertension who presents with generalized weakness.     Patient reports that she was sent home from TCU after a prolonged stay at rehab for a fractured right femoral head.  She reports that she did not feel adequately strong enough to return home or provide care for self.  Last evening she attempted to use the bathroom and middle night to urinate but was so weak that she felt she was about to fall.  She denied any chest pain or shortness of breath, no diarrhea, no dysuria.  Overall she felt very unsafe at home and unable to care for self, but she activated EMS for further symptoms.  She otherwise denies any recent head trauma, no bloody stools, no nausea/vomiting or abdominal pain.  She has no other complaints at this time right now.  She reports that her pain overall has been controlled with oxycodone as needed.    Review of Systems      The 10 point Review of Systems is negative other than noted in the HPI or here.     Past Medical History      I have reviewed this patient's medical history and updated it with pertinent information if needed.   Past Medical History:   Diagnosis Date     Diabetes (H)      Heart disease      Hypertension      Sleep apnea        Past Surgical History   I have reviewed this patient's  surgical history and updated it with pertinent information if needed.  Past Surgical History:   Procedure Laterality Date     ABDOMEN SURGERY       GASTRIC BYPASS       OPEN REDUCTION INTERNAL FIXATION HIP NAILING Right 1/23/2022    Procedure: OPEN REDUCTION INTERNAL FIXATION RIGHT HIP;  Surgeon: Nataly Avendano MD;  Location:  OR     ORTHOPEDIC SURGERY         Social History   I have reviewed this patient's social history and updated it with pertinent information if needed.  Social History     Tobacco Use     Smoking status: Never Smoker     Smokeless tobacco: Never Used   Substance Use Topics     Alcohol use: None     Drug use: None       Family History   I have reviewed this patient's family history and updated it with pertinent information if needed.  Family History   Problem Relation Age of Onset     No Known Problems Mother      No Known Problems Father      Prior to Admission Medications   Prior to Admission Medications   Prescriptions Last Dose Informant Patient Reported? Taking?   Lidocaine (LIDOCARE) 4 % Patch Past Week at Unknown time Self Yes Yes   Sig: Place 2 patches onto the skin every 24 hours To prevent lidocaine toxicity, patient should be patch free for 12 hrs daily.   acetaminophen (TYLENOL) 325 MG tablet as needed Self No Yes   Sig: Take 3 tablets (975 mg) by mouth every 8 hours as needed for mild pain   aspirin (ASA) 325 MG EC tablet 2/20/2022 at Unknown time Self No Yes   Sig: Take 1 tablet (325 mg) by mouth daily   benzonatate (TESSALON) 100 MG capsule as needed Self Yes Yes   Sig: Take 100 mg by mouth 3 times daily as needed for cough   buPROPion (WELLBUTRIN SR) 200 MG 12 hr tablet 2/20/2022 at Unknown time Self Yes Yes   Sig: Take 200 mg by mouth 2 times daily   calcium carbonate (TUMS) 500 MG chewable tablet as needed Self Yes Yes   Sig: Take 1 chew tab by mouth 4 times daily as needed for heartburn   dulaglutide (TRULICITY) 1.5 MG/0.5ML pen 2/14/2022 Self Yes No   Sig: Inject 1.5 mg  Subcutaneous every 7 days Mondays   folic acid (FOLVITE) 1 MG tablet 2/20/2022 at Unknown time Self Yes Yes   Sig: Take 1 tablet (1 mg) by mouth daily   furosemide (LASIX) 40 MG tablet 2/20/2022 at Unknown time Self Yes Yes   Sig: Take 1 tablet (40 mg) by mouth daily   hydrOXYzine (ATARAX) 25 MG tablet as needed Self No Yes   Sig: Take 1 tablet (25 mg) by mouth every 6 hours as needed for other or anxiety (adjuvant pain)   losartan (COZAAR) 50 MG tablet 2/20/2022 at Unknown time Self Yes Yes   Sig: Take 1 tablet (50 mg) by mouth daily   methocarbamol (ROBAXIN) 500 MG tablet 2/20/2022 at Unknown time Self Yes Yes   Sig: Take 500 mg by mouth 3 times daily   methocarbamol (ROBAXIN) 500 MG tablet as needed Self Yes Yes   Sig: Take 500 mg by mouth 3 times daily as needed for muscle spasms   metoprolol succinate ER (TOPROL-XL) 25 MG 24 hr tablet 2/20/2022 at Unknown time Self Yes Yes   Sig: Take 50 mg by mouth daily    multivitamin w/minerals (THERA-VIT-M) tablet 2/20/2022 at Unknown time Self Yes Yes   Sig: Take 1 tablet by mouth daily   oxyCODONE (ROXICODONE) 10 MG tablet 2/21/2022 at 0300 Self No Yes   Sig: Take 0.5-1 tablets (5-10 mg) by mouth every 4 hours as needed for severe pain (half tab for moderate pain (pain rating 4-6) whole tab for severe pain (pain rating 7-10))   pantoprazole (PROTONIX) 40 MG EC tablet 2/20/2022 at Unknown time Self Yes Yes   Sig: Take 1 tablet (40 mg) by mouth 2 times daily (before meals)   polyethylene glycol (MIRALAX) 17 g packet 2/20/2022 at Unknown time Self No Yes   Sig: Take 17 g by mouth daily as needed for constipation   rivaroxaban ANTICOAGULANT (XARELTO) 15 MG TABS tablet 2/20/2022 at Unknown time Self Yes Yes   Sig: Take 15 mg by mouth 2 times daily (with meals) To start 20 mg daily on 3/2/22   traZODone (DESYREL) 100 MG tablet as needed Self Yes Yes   Sig: Take 100 mg by mouth nightly as needed for sleep   vitamin B-12 (CYANOCOBALAMIN) 250 MCG tablet 2/20/2022 at Unknown time  Self Yes Yes   Sig: Take 250 mcg by mouth daily   vitamin D2 (ERGOCALCIFEROL) 15254 units (1250 mcg) capsule Past Week at Unknown time Self Yes Yes   Sig: Take 1 capsule (50,000 Units) by mouth every 7 days      Facility-Administered Medications: None     Allergies   Allergies   Allergen Reactions     Latex        Physical Exam   Vital Signs: Temp: 97.9  F (36.6  C) Temp src: Oral BP: (!) 153/72 Pulse: 74   Resp: 18 SpO2: 99 % O2 Device: None (Room air)    Weight: 0 lbs 0 oz    Constitutional: awake, alert, cooperative, no apparent distress.   Eyes: Lids and lashes normal, pupils equal, round and reactive to light   ENT: Normocephalic, without obvious abnormality, atraumatic, sinuses nontender on palpation   Hematologic / Lymphatic: no cervical lymphadenopathy   Respiratory: CTABL   Cardiovascular: RRR with no m/r/g   GI: Normal bowel sounds, soft, non-distended, non-tender. Skin: normal skin color, texture, turgor   Musculoskeletal: There is no redness, warmth, or swelling of the joints. Full range of motion noted.   Neurologic: Awake, alert, oriented to name, place and time. Cranial nerves II-XII are grossly intact. Motor is 5 out of 5 bilaterally. Sensory is intact.   Neuropsychiatric: normal mood and affect      Data   Data reviewed today: I reviewed all medications, new labs and imaging results over the last 24 hours. I personally reviewed no images or EKG's today.    Most Recent 3 CBC's:Recent Labs   Lab Test 02/21/22  1526 01/28/22  0905 01/25/22  0924 01/23/22  0444 01/23/22  0034   WBC 5.2  --   --   --  7.4   HGB 9.6* 8.8* 8.4*   < > 7.9*   MCV 75*  --   --   --  68*     --   --   --  265    < > = values in this interval not displayed.     Most Recent 3 BMP's:Recent Labs   Lab Test 02/21/22  1526 02/02/22  1233 02/02/22  0902 01/29/22  0849 01/29/22  0747 01/28/22  1255 01/28/22  0905 01/25/22  1140 01/25/22  0924   *  --   --   --   --   --  133  --  132*   POTASSIUM 3.8  --   --   --  3.7   --  3.6  --  3.6   CHLORIDE 92*  --   --   --   --   --  97  --  99   CO2 30  --   --   --   --   --  31  --  29   BUN 16  --   --   --   --   --  15  --  18   CR 1.06*  --   --   --   --   --  0.80  --  0.95   ANIONGAP 6  --   --   --   --   --  5  --  4   GABRIEL 9.5  --   --   --   --   --  8.9  --  8.2*   * 130* 147*   < >  --    < > 194*   < > 293*    < > = values in this interval not displayed.     Most Recent 2 LFT's:Recent Labs   Lab Test 01/25/22  0924 01/24/22  0845   AST 87* 55*   ALT 46 38   ALKPHOS 144 121   BILITOTAL 0.7 0.6     Most Recent 3 INR's:Recent Labs   Lab Test 01/23/22  0051   INR 1.01     No results found for this or any previous visit (from the past 24 hour(s)).

## 2022-02-22 NOTE — PROGRESS NOTES
Observation goals  PRIOR TO DISCHARGE       Comments: -diagnostic tests and consults completed and resulted: not met, PT, OT and SW to consult  -vital signs normal or at patient baseline: met   -tolerating oral intake to maintain hydration: met   -adequate pain control on oral analgesics: partially met   -returns to baseline functional status: not met   -safe disposition plan has been identified: not met   Nurse to notify provider when observation goals have been met and patient is ready for discharge.

## 2022-02-22 NOTE — PLAN OF CARE
Aox4. VSS on RA. Pain in R hip, gave oxycodone x1. PRN trazadone given. Moves A2 GB/W to BSC. NS infusing at 100mL/hr. Regular diet, blood sugar checks. Discharge pending disposition, PT and SW to consult. Continue to monitor.     Observation goals  PRIOR TO DISCHARGE       Comments: -diagnostic tests and consults completed and resulted: not met, PT and SW to consult  -vital signs normal or at patient baseline: met   -tolerating oral intake to maintain hydration: met   -adequate pain control on oral analgesics: partially met   -returns to baseline functional status: not met   -safe disposition plan has been identified: not met   Nurse to notify provider when observation goals have been met and patient is ready for discharge.

## 2022-02-22 NOTE — PROGRESS NOTES
02/22/22 1500   Quick Adds   Type of Visit Initial PT Evaluation   Living Environment   People in Home alone   Current Living Arrangements apartment   Home Accessibility no concerns   Transportation Anticipated agency   Living Environment Comments Pt lives alone in apartment with elevator, and only able to have intermittent help or check-ins from neighbor and friend, unable to have 24/7 assist   Self-Care   Usual Activity Tolerance fair   Current Activity Tolerance poor   Equipment Currently Used at Home walker, rolling;wheelchair, manual   Fall history within last six months yes   Number of times patient has fallen within last six months 1   Activity/Exercise/Self-Care Comment Prior to hip fx fall on 1/23/2022 pt was IND with mobilit in apartment. Currently pt was using a w/c to get around and performing transfers in apartment bathroom using FWW, pt began to feel unsafe and was unable to complete transfers not making it to bathroom anymore. At baseline pt is IND with ADL's and IADL's   General Information   Onset of Illness/Injury or Date of Surgery 02/21/22   Referring Physician Radhames Dominguez MD   Patient/Family Therapy Goals Statement (PT) pt wants to return home, but realizes she needs TCU    Pertinent History of Current Problem (include personal factors and/or comorbidities that impact the POC) Pt is a 61 year old female with PMHx of hypertension, T2DM, hx of drew-en-y, esophagitis and anastomotic ulcer, chronic iron deficiency anemia, alcohol abuse, depression, anxiety and recent mechanical fall with mildly impacted, basicervical fracture of right femur s/p open reduction and internal fixation using hip screw (1/23/2022) who recently completed her stay at TCU (2/20/22, [facility]). Pt returned home and reports ongoing weakness and inability to care for herself, thus presented to the ED. Registered to the short stay unit on 2/21/22 for further evaluation and treatment.    Existing Precautions/Restrictions  fall   General Observations no WB restrictions noted in chart review    Cognition   Affect/Mental Status (Cognition) WNL   Orientation Status (Cognition) oriented x 4   Pain Assessment   Patient Currently in Pain   (pt reports 10/10 Right hip pain with ambulation in FWW )   Posture    Posture Forward head position;Protracted shoulders   Range of Motion (ROM)   Range of Motion ROM is WFL   Strength (Manual Muscle Testing)   Strength (Manual Muscle Testing) Deficits observed during functional mobility   Strength Comments Not formally assessed, appears grossly antigravity, deficits with generalized weakness during functional mobility    Bed Mobility   Comment, (Bed Mobility) Supine<>sit Annie    Transfers   Comment, (Transfers) Sit<>stand with FWW and Annie, Stand pivot transfer Annie    Gait/Stairs (Locomotion)   [place] Level (Gait) minimum assist (75% patient effort)   Assistive Device (Gait) walker, front-wheeled   Distance in Feet (Required for LE Total Joints) 10' + 10'   Pattern (Gait) step-to   Deviations/Abnormal Patterns (Gait) antalgic   Comment, (Gait/Stairs) amb short distances with FWW and Annie x 1   Balance   Balance Comments sitting EOB Ind, standing with FWW unsteady needs Annie, deficits with dynamic balance secondary to weakness and pain in RLE from previous hip fx    Sensory Examination   Sensory Perception Comments pt denies any deficits    Coordination   Coordination no deficits were identified   Muscle Tone   Muscle Tone no deficits were identified   Clinical Impression   Criteria for Skilled Therapeutic Intervention Yes, treatment indicated   PT Diagnosis (PT) impaired gait, generalized weakness   Influenced by the following impairments deficits with functional strength and balance, pain, previous hip fx    Functional limitations due to impairments decreased ind with all functional mobility and ADL's, decreased acitvity tolerance, fall risk    Clinical Presentation (PT Evaluation Complexity)  Stable/Uncomplicated   Clinical Presentation Rationale PMH and clinical judgment   Clinical Decision Making (Complexity) low complexity   Planned Therapy Interventions (PT) balance training;bed mobility training;cryotherapy;gait training;home exercise program;patient/family education;strengthening;stretching;transfer training   Anticipated Equipment Needs at Discharge (PT)   (pt will provide)   Risk & Benefits of therapy have been explained evaluation/treatment results reviewed;care plan/treatment goals reviewed;risks/benefits reviewed;current/potential barriers reviewed;participants voiced agreement with care plan;participants included;patient   PT Discharge Planning   PT Discharge Recommendation (DC Rec) Transitional Care Facility;home with assist;home with home care physical therapy   PT Rationale for DC Rec Pt currently well below baseline needing assist of 1 for all functional transfer and amb. Currently ambulates very short distances with Letha x 1 and is a fall risk with decreased functional strength and balance. Recommend TCU to progress pt independence and safety before returning home. If pt were to return home now would require assist of 1-2 24/7 with FWW and w/c and would need HHPT to progress all functional mobility and safety due to burden of leaving home.    PT Brief overview of current status bed mobility: Letha, functional transfers: Letha x 1, amb with FWW short distances Letha x 1    Plan of Care Review   Plan of Care Reviewed With patient   Therapy Certification   Start of care date 02/22/22   Certification date from 02/22/22   Certification date to 03/04/22   Medical Diagnosis impaired gait and generalized weakness   Total Evaluation Time   Total Evaluation Time (Minutes) 10   Physical Therapy Goals   PT Frequency 5x/week   PT Predicated Duration/Target Date for Goal Attainment 03/01/22   PT: Bed Mobility Modified independent;Rolling;Bridging;Supine to/from sit   PT: Transfers Modified  independent;Sit to/from stand;Assistive device;Bed to/from chair   PT: Gait Modified independent;Rolling walker;25 feet

## 2022-02-22 NOTE — PROGRESS NOTES
RECEIVING UNIT ED HANDOFF REVIEW    ED Nurse Handoff Report was reviewed by: Melody Linares RN on February 21, 2022 at 6:13 PM

## 2022-02-22 NOTE — PROGRESS NOTES
"[facility]    Medicine Progress Note - Hospitalist Service    Date of Admission:  2/21/2022    Assessment & Plan        Lamar Menendez is a 61 year old female with PMHx of hypertension, T2DM, hx of drew-en-y, esophagitis and anastomotic ulcer, chronic iron deficiency anemia, alcohol abuse, depression, anxiety and recent mechanical fall with mildly impacted, basicervical fracture of right femur s/p open reduction and internal fixation using hip screw (1/23/2022) who recently completed her stay at TCU (2/20/22, [facility]). Pt returned home and reports ongoing weakness and inability to care for herself, thus presented to the ED. Registered to the short stay unit on 2/21/22 for further evaluation and treatment.      Generalized Weakness  Recent mechanical fall with mildly impacted, basicervical fracture of right femur s/p open reduction and internal fixation using hip screw (1/23/2022): Presented with generalized weakness in the setting of a recent right femoral head fracture with TCU stay and recent dismissal.  Patient resides independently, ambulates with a walker, but reports that she has significant generalized weakness and is unable to care for self.  She would like to seek placement.    - Continue with PTA analgesic regimen including oxycodone 5 mg q4 hrs PRN, Roabxin 500 mg TID PRN, lidoderm patch, Atarax 25 mg q6 hrs PRN, tylenol 975 mg q 8 hrs PRN  - Bowel regimen in place while on narcotics   - PT consulted, has been 50% weight bearing on RLE at TCU. Note discharged with wheelchair and has been ambulating with walker and wheelchair at home/ \"hopping around\". Has a bunch of safety supplies ordered to improve apartment safety, but they have not arrived. Was requiring 2 person assist or lift.   - SW for discharge planning     Pseudohyponatremia: Sodium corrected for hyperglycemia 134.   - Monitor     Recent posterior tibial vein DVT, right: Diagnosed via US 2/10/22. Pt has been " maintained on Xarelto since that time. Note 20 mg daily dosing to start 3/2/22.      Complete lateral meniscal tear of left knee: During most recent admission, reported significant left calf and left posterior thigh pain since her fall.  US negative for DVT but showed likely ruptured baker's cyst. MRI of the knee showed high-grade, likely complete lateral meniscal tear, full-thickness cartilage loss over femoral condyle and tibial plateau  * Received corticosteroid injection to left knee 1/30/22  - Outpatient management deferred to Ortho      Chronic iron deficiency anemia: Baseline Hgb ranging from 7-9. Receives intermittent iron transfusions. [facility] consulted last admission and did not recommend endoscopic evaluation.   - Monitor    Recent Labs   Lab 02/22/22  1022 02/21/22  1526   HGB 8.9* 9.6*      Esophagitis and anastomotic ulcer: EGD 2018 showed anastomotic ulcer  - Continue PTA Protonix     S/p Kavon-en-Y gastric bypass: Follow as outpatient     T2DM, uncontrolled: A1C 9.4 on 1/24/22.   [Trulicity 1.5 mg subcutaneous every Monday]  - Holding Trulicity given observation status (missed dose this past Monday)   - Lantus 10 U qam ordered. Pt is agreeable. Had been intermittently receiving 20 U at TCU, but often refused per documentation.   - Medium sliding scale insulin ordered   - Novolog 1 U per 15 g CHO TID with meals   - Educated on importance of good glucose control and the importance of outpatient blood glucose monitoring. Pt reports her glucometer needs a battery. She will get one and take her diabetes more seriously.     Recent Labs   Lab 02/22/22  1254 02/22/22  1022 02/22/22  0807 02/22/22  0216 02/21/22  2226 02/21/22  1526   * 274* 265* 295* 348* 359*     Alcohol use disorder   Alcoholic hepatitis: Longstanding history of alcohol use.  Used to drink heavily until 2009, then quit for 12 years after inpatient treatment. Relapsed a few months ago and now drinks 2-3 drinks per day (2-3 shots of  "Bacardi Limon + cranberry juice.  She is not interested in quitting alcohol at this time.   - Encourage alcohol cessation going forward     HFpEF, not in acute exacerbation   Mild to moderate pulmonary hypertension   * Echo 1/23 with EF 50-55%, no RWMA, mild to moderate pulmonary hypertension  - Outpatient sleep study recommended for suspected RANI   - Resume Lasix 40 mg po every day 2/22    Distended gallbladder on ultrasound without evidence of acute cholecystitis: Noted last admission with US showing above and mildly dilated CBD. No abdominal pain.   - Monitor for sx      Essential hypertension: Continue PTA Losartan 50 mg po every day and Toprol XL 50 mg po qd     Vitamin D deficiency: Vitamin D on 1/13/22 was 8. Continue ergocalciferol 50,000 units weekly at discharge     Hyperlipidemia: Lipid panel on 1/13/22 with , HDL 84, triglycerides 106     Chronic major depression  Insomnia: Reports chronic hopelessness.   - Continue Trazodone 100 mg at bedtime PRN and Wellbutrin  mg BID (pt requests second dose be ~2PM)      Diet: Regular Diet Adult    DVT Prophylaxis: DOAC  Stanford Catheter: Not present  Central Lines: None  Cardiac Monitoring: None  Code Status: No CPR- Do NOT Intubate      Disposition Plan  Expected Discharge: Pending placement. Pt is medically ready for discharge.      The patient's care was discussed with the Attending Physician, Dr. Luo , Bedside Nurse and Patient.    Meri Bateman PA-C  Hospitalist Service  [facility]  Securely message with the Vocera Web Console (learn more here)  Text page via [employer] Paging/Directory   __________________________________________________________________    Interval History   No acute events overnight. Blood sugars elevated, discussed importance of better blood glucose control and suggested restarting Lantus for which patient is agreeable. Pain well managed. Reports \"I never thought I would be a " "vulnerable adult.\" Her#1 choice for TCU is [facility] in [place].     Data reviewed today: I reviewed all medications, new labs and imaging results over the last 24 hours. I personally reviewed all labs and imaging to date.   Physical Exam   Vital Signs: Temp: 97.1  F (36.2  C) Temp src: Oral BP: (!) 148/67 Pulse: 76   Resp: 18 SpO2: 95 % O2 Device: None (Room air)    Weight: 0 lbs 0 oz    CONSTITUTIONAL: Pt laying in bed, dressed in hospital garb. Appears comfortable. Cooperative with interview.   HEENT: Normocephalic, atraumatic.   CARDIOVASCULAR: RRR, no murmurs, rubs, or extra heart sounds appreciated. Pulses +2/4 and regular in upper and lower extremities, bilaterally.   RESPIRATORY: No increased work of breathing. CTA, bilat; no wheezes, rales, or rhonchi appreciated.  GASTROINTESTINAL:  Abdomen soft, non-distended. BS auscultated in all four quadrants. Negative for tenderness to palpation.  No masses or organomegaly noted.  MUSCULOSKELETAL: No gross deformities noted. Normal muscle tone. Limited ROM in RLE due to pain.   HEMATOLOGIC/LYMPHATIC/IMMUNOLOGIC: Negative for lower extremity edema, bilaterally.  NEUROLOGIC: Alert and oriented to person, place, and time.  strength intact. No focal neuro deficits.   SKIN: Warm, dry, intact. No jaundice noted. Negative for suspicious lesions, rashes, bruising, open sores or abrasions.     Data   Recent Labs   Lab 02/22/22  1254 02/22/22  1022 02/22/22  0807 02/21/22  2226 02/21/22  1526   WBC  --  5.1  --   --  5.2   HGB  --  8.9*  --   --  9.6*   MCV  --  74*  --   --  75*   PLT  --  247  --   --  261   NA  --  132*  --   --  128*   POTASSIUM  --  3.6  --   --  3.8   CHLORIDE  --  100  --   --  92*   CO2  --  26  --   --  30   BUN  --  12  --   --  16   CR  --  0.85  --   --  1.06*   ANIONGAP  --  6  --   --  6   GABRIEL  --  8.6  --   --  9.5   * 274* 265*   < > 359*    < > = values in this interval not displayed.     No results found for this or " any previous visit (from the past 24 hour(s)).  Medications       buPROPion  200 mg Oral BID     furosemide  40 mg Oral Daily     insulin aspart  1-7 Units Subcutaneous TID AC     insulin aspart  1-5 Units Subcutaneous At Bedtime     insulin aspart   Subcutaneous TID w/meals     insulin glargine  10 Units Subcutaneous QAM AC     lidocaine  1 patch Transdermal Q24H     lidocaine   Transdermal Q8H     losartan  50 mg Oral Daily     metoprolol succinate ER  50 mg Oral Daily     multivitamin w/minerals  1 tablet Oral Daily     pantoprazole  40 mg Oral BID AC     polyethylene glycol  17 g Oral Daily     rivaroxaban ANTICOAGULANT  15 mg Oral BID w/meals     sodium chloride (PF)  3 mL Intracatheter Q8H

## 2022-02-22 NOTE — PROGRESS NOTES
-diagnostic tests and consults completed and resulted   -vital signs normal or at patient baseline not met  -tolerating oral intake to maintain hydration met  -adequate pain control on oral analgesics not met  -returns to baseline functional status not met  -safe disposition plan has been identified not met  Nurse to notify provider when observation goals have been met and patient is ready for discharge

## 2022-02-23 ENCOUNTER — APPOINTMENT (OUTPATIENT)
Dept: PHYSICAL THERAPY | Facility: CLINIC | Age: 62
End: 2022-02-23
Payer: COMMERCIAL

## 2022-02-23 LAB
ANION GAP SERPL CALCULATED.3IONS-SCNC: 4 MMOL/L (ref 3–14)
BUN SERPL-MCNC: 11 MG/DL (ref 7–30)
CALCIUM SERPL-MCNC: 9.3 MG/DL (ref 8.5–10.1)
CHLORIDE BLD-SCNC: 100 MMOL/L (ref 94–109)
CO2 SERPL-SCNC: 28 MMOL/L (ref 20–32)
CREAT SERPL-MCNC: 0.92 MG/DL (ref 0.52–1.04)
GFR SERPL CREATININE-BSD FRML MDRD: 70 ML/MIN/1.73M2
GLUCOSE BLD-MCNC: 190 MG/DL (ref 70–99)
GLUCOSE BLDC GLUCOMTR-MCNC: 151 MG/DL (ref 70–99)
GLUCOSE BLDC GLUCOMTR-MCNC: 172 MG/DL (ref 70–99)
GLUCOSE BLDC GLUCOMTR-MCNC: 172 MG/DL (ref 70–99)
GLUCOSE BLDC GLUCOMTR-MCNC: 179 MG/DL (ref 70–99)
GLUCOSE BLDC GLUCOMTR-MCNC: 205 MG/DL (ref 70–99)
GLUCOSE BLDC GLUCOMTR-MCNC: 243 MG/DL (ref 70–99)
HGB BLD-MCNC: 9.6 G/DL (ref 11.7–15.7)
POTASSIUM BLD-SCNC: 3.7 MMOL/L (ref 3.4–5.3)
SODIUM SERPL-SCNC: 132 MMOL/L (ref 133–144)

## 2022-02-23 PROCEDURE — 250N000013 HC RX MED GY IP 250 OP 250 PS 637: Performed by: STUDENT IN AN ORGANIZED HEALTH CARE EDUCATION/TRAINING PROGRAM

## 2022-02-23 PROCEDURE — G0378 HOSPITAL OBSERVATION PER HR: HCPCS

## 2022-02-23 PROCEDURE — 99225 PR SUBSEQUENT OBSERVATION CARE,LEVEL II: CPT | Performed by: PHYSICIAN ASSISTANT

## 2022-02-23 PROCEDURE — 36415 COLL VENOUS BLD VENIPUNCTURE: CPT | Performed by: PHYSICIAN ASSISTANT

## 2022-02-23 PROCEDURE — 80048 BASIC METABOLIC PNL TOTAL CA: CPT | Performed by: PHYSICIAN ASSISTANT

## 2022-02-23 PROCEDURE — 82962 GLUCOSE BLOOD TEST: CPT

## 2022-02-23 PROCEDURE — 97530 THERAPEUTIC ACTIVITIES: CPT | Mod: GP

## 2022-02-23 PROCEDURE — 85018 HEMOGLOBIN: CPT | Performed by: PHYSICIAN ASSISTANT

## 2022-02-23 PROCEDURE — 99207 PR CDG-CODE CATEGORY CHANGED: CPT | Performed by: PHYSICIAN ASSISTANT

## 2022-02-23 PROCEDURE — 97116 GAIT TRAINING THERAPY: CPT | Mod: GP

## 2022-02-23 PROCEDURE — 250N000013 HC RX MED GY IP 250 OP 250 PS 637: Performed by: PHYSICIAN ASSISTANT

## 2022-02-23 PROCEDURE — 96372 THER/PROPH/DIAG INJ SC/IM: CPT

## 2022-02-23 RX ADMIN — OXYCODONE HYDROCHLORIDE 5 MG: 5 TABLET ORAL at 14:19

## 2022-02-23 RX ADMIN — METHOCARBAMOL 500 MG: 500 TABLET ORAL at 17:12

## 2022-02-23 RX ADMIN — METHOCARBAMOL 500 MG: 500 TABLET ORAL at 00:17

## 2022-02-23 RX ADMIN — BUPROPION HYDROCHLORIDE 200 MG: 200 TABLET, FILM COATED, EXTENDED RELEASE ORAL at 14:13

## 2022-02-23 RX ADMIN — TRAZODONE HYDROCHLORIDE 100 MG: 50 TABLET ORAL at 00:17

## 2022-02-23 RX ADMIN — POLYETHYLENE GLYCOL 3350 17 G: 17 POWDER, FOR SOLUTION ORAL at 09:22

## 2022-02-23 RX ADMIN — LOSARTAN POTASSIUM 50 MG: 50 TABLET, FILM COATED ORAL at 09:11

## 2022-02-23 RX ADMIN — TRAZODONE HYDROCHLORIDE 100 MG: 50 TABLET ORAL at 21:14

## 2022-02-23 RX ADMIN — BUPROPION HYDROCHLORIDE 200 MG: 200 TABLET, FILM COATED, EXTENDED RELEASE ORAL at 09:11

## 2022-02-23 RX ADMIN — RIVAROXABAN 15 MG: 15 TABLET, FILM COATED ORAL at 09:11

## 2022-02-23 RX ADMIN — HYDROXYZINE HYDROCHLORIDE 25 MG: 25 TABLET ORAL at 15:06

## 2022-02-23 RX ADMIN — PANTOPRAZOLE SODIUM 40 MG: 40 TABLET, DELAYED RELEASE ORAL at 17:06

## 2022-02-23 RX ADMIN — LIDOCAINE 1 PATCH: 246 PATCH TOPICAL at 09:22

## 2022-02-23 RX ADMIN — OXYCODONE HYDROCHLORIDE 5 MG: 5 TABLET ORAL at 09:10

## 2022-02-23 RX ADMIN — PANTOPRAZOLE SODIUM 40 MG: 40 TABLET, DELAYED RELEASE ORAL at 09:11

## 2022-02-23 RX ADMIN — INSULIN GLARGINE 10 UNITS: 100 INJECTION, SOLUTION SUBCUTANEOUS at 09:14

## 2022-02-23 RX ADMIN — RIVAROXABAN 15 MG: 15 TABLET, FILM COATED ORAL at 17:06

## 2022-02-23 RX ADMIN — METOPROLOL SUCCINATE 50 MG: 50 TABLET, EXTENDED RELEASE ORAL at 09:11

## 2022-02-23 RX ADMIN — HYDROXYZINE HYDROCHLORIDE 25 MG: 25 TABLET ORAL at 21:14

## 2022-02-23 RX ADMIN — ACETAMINOPHEN 975 MG: 325 TABLET, FILM COATED ORAL at 18:51

## 2022-02-23 RX ADMIN — OXYCODONE HYDROCHLORIDE 5 MG: 5 TABLET ORAL at 18:10

## 2022-02-23 RX ADMIN — METHOCARBAMOL 500 MG: 500 TABLET ORAL at 11:32

## 2022-02-23 RX ADMIN — HYDROXYZINE HYDROCHLORIDE 25 MG: 25 TABLET ORAL at 09:11

## 2022-02-23 NOTE — PLAN OF CARE
Goal Outcome Evaluation:    Plan of Care Reviewed With: patient     Pt A/O x 4. Pain covered with PO meds. Inc CDI. Bruising on rt hip. CMS intact. Tolerating diet. Up with assist of one and a walker. Plan to discharge to TCU. Awaiting placement.

## 2022-02-23 NOTE — PLAN OF CARE
A&Ox4 and VSS on RA ex htn. Up assist of 1 with gb/w and tolerating regular diet with carb counting.  and correction given. Lidocaine patch on R thigh and IV SL. Robaxin given x1. PT, OT consult. Continue to monitor.

## 2022-02-23 NOTE — PROGRESS NOTES
"[facility]    Medicine Progress Note - Hospitalist Service    Date of Admission:  2/21/2022    Assessment & Plan        Lamar Menendez is a 61 year old female with PMHx of hypertension, T2DM, hx of drew-en-y, esophagitis and anastomotic ulcer, chronic iron deficiency anemia, alcohol abuse, depression, anxiety and recent mechanical fall with mildly impacted, basicervical fracture of right femur s/p open reduction and internal fixation using hip screw (1/23/2022) who recently completed her stay at TCU (2/20/22, [facility]). Pt returned home and reports ongoing weakness and inability to care for herself, thus presented to the ED. Registered to the short stay unit on 2/21/22 for further evaluation and treatment.      Generalized Weakness  Recent mechanical fall with mildly impacted, basicervical fracture of right femur s/p open reduction and internal fixation using hip screw (1/23/2022):  Presented with generalized weakness in the setting of a recent right femoral head fracture with TCU stay and recent dismissal.  Patient resides independently, ambulates with a walker, but reports that she has significant generalized weakness and is unable to care for self.  She would like to seek placement.    - Continue with PTA analgesic regimen including oxycodone 5 mg q4 hrs PRN, Roabxin 500 mg TID PRN, lidoderm patch, Atarax 25 mg q6 hrs PRN, tylenol 975 mg q 8 hrs PRN  - Bowel regimen in place while on narcotics   - PT consulted, has been 50% weight bearing on RLE at TCU. Note discharged with wheelchair and has been ambulating with walker and wheelchair at home / \"hopping around\". Has a bunch of safety supplies ordered to improve apartment safety, but they have not arrived. Was requiring 2 person assist or lift.   -  for discharge planning; patient is medically stable for discharge    T2DM, uncontrolled: A1C 9.4 on 1/24/22.   [Trulicity 1. mg subcutaneous every Monday]  - Holding Trulicity given " observation status (missed dose this past Monday)   - Lantus 10 U qam ordered. Pt is agreeable. Had been intermittently receiving 20 U at TCU, but often refused per documentation.   - Medium sliding scale insulin ordered   - Novolog 1 U per 15 g CHO TID with meals   - Educated on importance of good glucose control and the importance of outpatient blood glucose monitoring. Pt reports her glucometer needs a battery. She will get one and take her diabetes more seriously.     Pseudohyponatremia:  Sodium corrected for hyperglycemia 134.     Recent posterior tibial vein DVT, right:  Diagnosed via US 2/10/22. Pt has been maintained on Xarelto since that time. Note 20 mg daily dosing to start 3/2/22.      Complete lateral meniscal tear of left knee: During most recent admission, reported significant left calf and left posterior thigh pain since her fall.  US negative for DVT but showed likely ruptured baker's cyst. MRI of the knee showed high-grade, likely complete lateral meniscal tear, full-thickness cartilage loss over femoral condyle and tibial plateau  * Received corticosteroid injection to left knee 1/30/22  - Outpatient management deferred to Ortho      Chronic iron deficiency anemia:  Baseline Hgb ranging from 7-9. Receives intermittent iron transfusions. [facility] consulted last admission and did not recommend endoscopic evaluation.     Esophagitis and anastomotic ulcer: EGD 2018 showed anastomotic ulcer  - Continue PTA Protonix     S/p Kavon-en-Y gastric bypass: Follow as outpatient     Alcohol use disorder   Alcoholic hepatitis:  Longstanding history of alcohol use.  Used to drink heavily until 2009, then quit for 12 years after inpatient treatment. Relapsed a few months ago and now drinks 2-3 drinks per day (2-3 shots of Bacardi Limon + cranberry juice.  She is not interested in quitting alcohol at this time.   - Encourage alcohol cessation going forward     HFpEF, not in acute exacerbation   Mild to moderate  pulmonary hypertension   * Echo 1/23 with EF 50-55%, no RWMA, mild to moderate pulmonary hypertension  - Outpatient sleep study recommended for suspected RANI   - PTA Lasix    Distended gallbladder on ultrasound without evidence of acute cholecystitis: Noted last admission with US showing above and mildly dilated CBD. No abdominal pain.   - Monitor for sx      Essential hypertension: Continue PTA Losartan 50 mg po every day and Toprol XL 50 mg po qd     Vitamin D deficiency: Vitamin D on 1/13/22 was 8. Continue ergocalciferol 50,000 units weekly at discharge     Hyperlipidemia: Lipid panel on 1/13/22 with , HDL 84, triglycerides 106     Chronic major depression  Insomnia: Reports chronic hopelessness.   - Continue Trazodone 100 mg at bedtime PRN and Wellbutrin  mg BID (pt requests second dose be ~2PM)      Diet: Regular Diet Adult    DVT Prophylaxis: DOAC  Stanford Catheter: Not present  Central Lines: None  Cardiac Monitoring: None  Code Status: No CPR- Do NOT Intubate      Disposition Plan  Expected Discharge: Pending placement. Pt is medically ready for discharge.      The patient's care was discussed with the Attending Physician, Dr. Luo , Bedside Nurse and Patient.    JoAnna K. Barthell, PA-C  Hospitalist Service  [facility]  Securely message with the [employer] Web Console (learn more here)  Text page via [employer] Paging/Directory   __________________________________________________________________    Interval History   No complaints. Waiting placement. BG better controlled.    Data reviewed today: I reviewed all medications, new labs and imaging results over the last 24 hours. I personally reviewed all labs and imaging to date.    Physical Exam   Physical Exam   Temp: 98  F (36.7  C) Temp src: Oral BP: (!) 141/60 Pulse: 73   Resp: 18 SpO2: 95 % O2 Device: None (Room air)    There were no vitals filed for this visit.  Constitutional: Appears stated age, no acute  distress.  Respiratory: Breath sounds CTA. No increased work of breathing.  Cardiovascular: RRR, no rub or murmur. No peripheral edema.  GI: Soft, non-tender, non-distended.  Skin: Warm, dry, no rashes or lesions.  MSK: Surgical incision over right hip appears to be healing well, incision c/d/i.    Data   Recent Labs   Lab 02/23/22  1207 02/23/22  0855 02/23/22  0826 02/22/22  1254 02/22/22  1022 02/21/22  2226 02/21/22  1526   WBC  --   --   --   --  5.1  --  5.2   HGB  --   --  9.6*  --  8.9*  --  9.6*   MCV  --   --   --   --  74*  --  75*   PLT  --   --   --   --  247  --  261   NA  --   --  132*  --  132*  --  128*   POTASSIUM  --   --  3.7  --  3.6  --  3.8   CHLORIDE  --   --  100  --  100  --  92*   CO2  --   --  28  --  26  --  30   BUN  --   --  11  --  12  --  16   CR  --   --  0.92  --  0.85  --  1.06*   ANIONGAP  --   --  4  --  6  --  6   GABRIEL  --   --  9.3  --  8.6  --  9.5   * 179* 190*   < > 274*   < > 359*    < > = values in this interval not displayed.     No results found for this or any previous visit (from the past 24 hour(s)).  Medications       buPROPion  200 mg Oral BID     furosemide  40 mg Oral Daily     insulin aspart  1-7 Units Subcutaneous TID AC     insulin aspart  1-5 Units Subcutaneous At Bedtime     insulin aspart   Subcutaneous TID w/meals     insulin glargine  10 Units Subcutaneous QAM AC     lidocaine  1 patch Transdermal Q24H     lidocaine   Transdermal Q8H     losartan  50 mg Oral Daily     metoprolol succinate ER  50 mg Oral Daily     multivitamin w/minerals  1 tablet Oral Daily     pantoprazole  40 mg Oral BID AC     polyethylene glycol  17 g Oral Daily     rivaroxaban ANTICOAGULANT  15 mg Oral BID w/meals     sodium chloride (PF)  3 mL Intracatheter Q8H

## 2022-02-23 NOTE — PROGRESS NOTES
Baseline Shift Assessment:  [id number]    Resting in bed.   Discussed current and ongoing care plan goals. Including pain management and discharge planning. She is hopeful that  will be able to locate LTC/Rehab for her.    States that right hip pain is ongoing. Described as the worse pain that she has ever experienced.    Medicated @ 2238  with Robaxin 500 mg and Oxycodone 5 mg  7/10 pain scale.  Reassessed 30 min post dose. Patient states relief. 3/10.  Will continue to provide interventions and shanier.

## 2022-02-23 NOTE — PROGRESS NOTES
Observation goals  PRIOR TO DISCHARGE       Comments: -diagnostic tests and consults completed and resulted: not met, PT, OT and SW to consult  -vital signs normal or at patient baseline: met   -tolerating oral intake to maintain hydration: met   -adequate pain control on oral analgesics: met   -returns to baseline functional status: not met   -safe disposition plan has been identified: not met   Nurse to notify provider when observation goals have been met and patient is ready for discharge.

## 2022-02-23 NOTE — PLAN OF CARE
Date & Time 2/22/2022  [id number]      Pain Management: R hip pain controlled with Robaxin x2 and Oxycodone 5 mg  x2. Trazdone 100mg given for sleep.    OOB with one person assistance to use the bathroom. Tolerated activity well.   Post medicating .   Slept through the night.  Awakes for vital signs to be checked and falls back asleep.  Comfort needs met

## 2022-02-23 NOTE — PROGRESS NOTES
Orientation: a/o x 4  Observation Goals (met & not met): not met  Activity Level: GB, walker, 2 assist  Fall Risk: yes  Behavior & Aggression Tool Color: green  Pain Management: oxycodone, robaxin  ABNL VS/O2: stable   ABNL Lab/BG:   Diet: regular  Bowel/Bladder: BS audible,  commode  Drains/Devices: no  Tests/Procedures for next shift: N/A  Anticipated DC date: pending   Other Important Info:

## 2022-02-23 NOTE — UTILIZATION REVIEW
"  Admission Status; Secondary Review Determination         Under the authority of the Utilization Management Committee, the utilization review process indicated a secondary review on the above patient.  The review outcome is based on review of the medical records, discussions with staff, and applying clinical experience noted on the date of the review.       (x) Observation Status Appropriate - This patient does not meet hospital inpatient criteria and is placed in observation status. If this patient's primary payer is Medicare and was admitted as an inpatient, Condition Code 44 should be used and patient status changed to \"observation\".     RATIONALE FOR DETERMINATION   The patient is a 61-year-old female with a history of alcohol abuse and status post Kavon-en-Y surgery with type 2 diabetes mellitus. She recently completed a stay at [facility] and returned home and felt weak and unable to care for herself and presented to the ED. She was admitted on 2/21/2022. Patient does have numerous chronic medical issues but currently none of those are being actively assessed or treated differently than her regular outpatient treatment. Disposition is currently pending and apparently a TCU bed is being sought. Based on current diagnosis and treatment, observation status remains appropriate. Hemoglobin is 9.6 white count is 5.1 patient has chronic hyponatremia and sodium is 132. Blood sugars are between 190 and mid 250s.      The severity of illness, intensity of service provided, expected LOS and risk for adverse outcome make the care complex, high risk and appropriate for hospital admission.        The information on this document is developed by the utilization review team in order for the business office to ensure compliance.  This only denotes the appropriateness of proper admission status and does not reflect the quality of care rendered.         The definitions of Inpatient Status and Observation Status used in " making the determination above are those provided in the CMS Coverage Manual, Chapter 1 and Chapter 6, section 70.4.      Sincerely,     Christopher Richards MD  Physician Advisor  Utilization Review/ Case Management  [facility].

## 2022-02-24 ENCOUNTER — APPOINTMENT (OUTPATIENT)
Dept: PHYSICAL THERAPY | Facility: CLINIC | Age: 62
End: 2022-02-24
Payer: COMMERCIAL

## 2022-02-24 LAB
CREAT SERPL-MCNC: 0.85 MG/DL (ref 0.52–1.04)
GFR SERPL CREATININE-BSD FRML MDRD: 77 ML/MIN/1.73M2
GLUCOSE BLDC GLUCOMTR-MCNC: 171 MG/DL (ref 70–99)
GLUCOSE BLDC GLUCOMTR-MCNC: 174 MG/DL (ref 70–99)
GLUCOSE BLDC GLUCOMTR-MCNC: 178 MG/DL (ref 70–99)
GLUCOSE BLDC GLUCOMTR-MCNC: 236 MG/DL (ref 70–99)

## 2022-02-24 PROCEDURE — 82962 GLUCOSE BLOOD TEST: CPT

## 2022-02-24 PROCEDURE — 82565 ASSAY OF CREATININE: CPT

## 2022-02-24 PROCEDURE — 36415 COLL VENOUS BLD VENIPUNCTURE: CPT

## 2022-02-24 PROCEDURE — G0378 HOSPITAL OBSERVATION PER HR: HCPCS

## 2022-02-24 PROCEDURE — 97530 THERAPEUTIC ACTIVITIES: CPT | Mod: GP

## 2022-02-24 PROCEDURE — 250N000013 HC RX MED GY IP 250 OP 250 PS 637: Performed by: PHYSICIAN ASSISTANT

## 2022-02-24 PROCEDURE — 99225 PR SUBSEQUENT OBSERVATION CARE,LEVEL II: CPT | Performed by: PHYSICIAN ASSISTANT

## 2022-02-24 PROCEDURE — 250N000013 HC RX MED GY IP 250 OP 250 PS 637: Performed by: STUDENT IN AN ORGANIZED HEALTH CARE EDUCATION/TRAINING PROGRAM

## 2022-02-24 PROCEDURE — 97110 THERAPEUTIC EXERCISES: CPT | Mod: GP

## 2022-02-24 PROCEDURE — 96372 THER/PROPH/DIAG INJ SC/IM: CPT

## 2022-02-24 RX ORDER — POLYETHYLENE GLYCOL 3350 17 G/17G
17 POWDER, FOR SOLUTION ORAL 2 TIMES DAILY
Status: DISCONTINUED | OUTPATIENT
Start: 2022-02-24 | End: 2022-02-25 | Stop reason: HOSPADM

## 2022-02-24 RX ORDER — BISACODYL 10 MG
10 SUPPOSITORY, RECTAL RECTAL ONCE
Status: DISCONTINUED | OUTPATIENT
Start: 2022-02-24 | End: 2022-02-25 | Stop reason: HOSPADM

## 2022-02-24 RX ORDER — BISACODYL 5 MG
5 TABLET, DELAYED RELEASE (ENTERIC COATED) ORAL DAILY PRN
Status: DISCONTINUED | OUTPATIENT
Start: 2022-02-24 | End: 2022-02-25 | Stop reason: HOSPADM

## 2022-02-24 RX ORDER — BISACODYL 10 MG
10 SUPPOSITORY, RECTAL RECTAL DAILY PRN
Status: DISCONTINUED | OUTPATIENT
Start: 2022-02-24 | End: 2022-02-25 | Stop reason: HOSPADM

## 2022-02-24 RX ADMIN — LOSARTAN POTASSIUM 50 MG: 50 TABLET, FILM COATED ORAL at 08:38

## 2022-02-24 RX ADMIN — POLYETHYLENE GLYCOL 3350 17 G: 17 POWDER, FOR SOLUTION ORAL at 20:25

## 2022-02-24 RX ADMIN — METHOCARBAMOL 500 MG: 500 TABLET ORAL at 06:50

## 2022-02-24 RX ADMIN — OXYCODONE HYDROCHLORIDE 5 MG: 5 TABLET ORAL at 18:06

## 2022-02-24 RX ADMIN — TRAZODONE HYDROCHLORIDE 100 MG: 50 TABLET ORAL at 22:33

## 2022-02-24 RX ADMIN — METOPROLOL SUCCINATE 50 MG: 50 TABLET, EXTENDED RELEASE ORAL at 08:38

## 2022-02-24 RX ADMIN — LIDOCAINE 1 PATCH: 246 PATCH TOPICAL at 08:36

## 2022-02-24 RX ADMIN — BUPROPION HYDROCHLORIDE 200 MG: 200 TABLET, FILM COATED, EXTENDED RELEASE ORAL at 08:39

## 2022-02-24 RX ADMIN — RIVAROXABAN 15 MG: 15 TABLET, FILM COATED ORAL at 18:06

## 2022-02-24 RX ADMIN — INSULIN GLARGINE 10 UNITS: 100 INJECTION, SOLUTION SUBCUTANEOUS at 08:41

## 2022-02-24 RX ADMIN — OXYCODONE HYDROCHLORIDE 5 MG: 5 TABLET ORAL at 22:21

## 2022-02-24 RX ADMIN — POLYETHYLENE GLYCOL 3350 17 G: 17 POWDER, FOR SOLUTION ORAL at 08:39

## 2022-02-24 RX ADMIN — RIVAROXABAN 15 MG: 15 TABLET, FILM COATED ORAL at 08:38

## 2022-02-24 RX ADMIN — METHOCARBAMOL 500 MG: 500 TABLET ORAL at 14:57

## 2022-02-24 RX ADMIN — OXYCODONE HYDROCHLORIDE 5 MG: 5 TABLET ORAL at 08:26

## 2022-02-24 RX ADMIN — BUPROPION HYDROCHLORIDE 200 MG: 200 TABLET, FILM COATED, EXTENDED RELEASE ORAL at 13:47

## 2022-02-24 RX ADMIN — PANTOPRAZOLE SODIUM 40 MG: 40 TABLET, DELAYED RELEASE ORAL at 18:06

## 2022-02-24 RX ADMIN — OXYCODONE HYDROCHLORIDE 5 MG: 5 TABLET ORAL at 12:41

## 2022-02-24 RX ADMIN — ACETAMINOPHEN 975 MG: 325 TABLET, FILM COATED ORAL at 03:49

## 2022-02-24 RX ADMIN — PANTOPRAZOLE SODIUM 40 MG: 40 TABLET, DELAYED RELEASE ORAL at 06:50

## 2022-02-24 RX ADMIN — OXYCODONE HYDROCHLORIDE 5 MG: 5 TABLET ORAL at 03:49

## 2022-02-24 NOTE — PROGRESS NOTES
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    Admitted under observation status for safe disposition planning on 2/21 after presenting with concerns about safely caring for herself at home.    Hx traumatic right femur fracture s/p ORIF (1/23/2022).  Complete lateral meniscal tear of left knee s/p corticosteroid injection to left knee (1/30/2022).  Reportedly was 50% weight bearing on RLE at TCU and appears discharged with wheelchair. Ambulating with walker and wheelchair at home / \"hopping around\". Safety supplies ordered to improve apartment safety, but not yet arrived. Was requiring 2 person assist or lift.    [PTA: APAP 975mg Q8H PRN, oxycodone 5mg Q4H PRN, Roabxin 500mg TID PRN, lidoderm patch, Atarax 25mg Q6H PRN].  - Continue PTA regimen.  - PT eval rec TCU.  - SW for discharge planning; patient is medically stable for discharge.    Opioid-induced constipation.  Last bm 2/21, daily is her normal.  - Increase scheduled Miralax to BID and give suppository.  - Add PRN dulcolax suppository and PO. (Refuses Senokot-s).    T2DM, uncontrolled. A1C 9.4 (1/24/22_.   - Holding PTA Trulicity under observation status.  - Inc Lantus from 10 to 12u.  - Medium SSI and Novolog 1u:15g carbs TID with meals.    Right posterior tibial vein DVT.  Diagnosed via US 2/10/22. Pt has been maintained on Xarelto since that time.  - Cont PTA Xarelto. Dose change to 20mg daily starting 3/2/22.     HTN.   Chronic HFpEF, not in acute exacerbation   Mild to moderate pulmonary hypertension   * Echo 1/23 with EF 50-55%, no RWMA, mild to " moderate pulmonary hypertension  - Outpatient sleep study recommended for suspected RANI   - Cont PTA Lasix, Losartan, Toprol XL.     Alcohol use disorder with mild alcoholic hepatitis.  Used to drink heavily until 2009, then quit for 12 years after inpatient treatment. Relapsed a few months ago and now drinks 2-3 drinks per day (2-3 shots of Bacardi Limon + cranberry juice.  She is not interested in quitting alcohol at this time.   - Encourage alcohol cessation going forward.     Chronic major depression.  Insomnia.  - Continue PTA Trazodone and Wellbutrin  mg BID.    Chronic iron deficiency anemia.  Esophagitis and anastomotic ulcer.  Baseline Hgb ranging from 7-9. Receives intermittent iron transfusions. [facility] consulted last admission and did not recommend endoscopic evaluation.   - Continue PTA Protonix.     S/p Kavon-en-Y gastric bypass.    Diet: Regular Diet Adult    DVT Prophylaxis: DOAC  Stanford Catheter: Not present  Central Lines: None  Cardiac Monitoring: None  Code Status: No CPR- Do NOT Intubate      Disposition Plan  Expected Discharge: Pending placement. Pt is medically ready for discharge.      The patient's care was discussed with the Attending Physician, Dr. Luo , Bedside Nurse and Patient.    JoAnna K. Barthell, PA-C  Hospitalist Service  [facility]  __________________________________________________________________    Interval History   Last bm Monday, daily is her normal. Feels good about ambulating some in [place] today. No complaints. Waiting placement. BG trend reviewed.    Data reviewed today: I reviewed all medications, new labs and imaging results over the last 24 hours.    Physical Exam   Physical Exam   Temp: 98.5  F (36.9  C) Temp src: Oral BP: (!) 140/72 Pulse: 69   Resp: 18 SpO2: 97 % O2 Device: None (Room air)    There were no vitals filed for this visit.  Constitutional: Appears stated age, no acute distress.  Respiratory: Breath sounds CTA. No  increased work of breathing.  Cardiovascular: RRR, no rub or murmur. No peripheral edema.  GI: Soft, non-tender, non-distended.  Skin: Warm, dry, no rashes or lesions.  MSK: Surgical incision over right hip appears to be healing well, incision c/d/i.    Data   Recent Labs   Lab 02/24/22  0641 02/24/22  0205 02/23/22  2139 02/23/22  1704 02/23/22  0855 02/23/22  0826 02/22/22  1254 02/22/22  1022 02/21/22  2226 02/21/22  1526   WBC  --   --   --   --   --   --   --  5.1  --  5.2   HGB  --   --   --   --   --  9.6*  --  8.9*  --  9.6*   MCV  --   --   --   --   --   --   --  74*  --  75*   PLT  --   --   --   --   --   --   --  247  --  261   NA  --   --   --   --   --  132*  --  132*  --  128*   POTASSIUM  --   --   --   --   --  3.7  --  3.6  --  3.8   CHLORIDE  --   --   --   --   --  100  --  100  --  92*   CO2  --   --   --   --   --  28  --  26  --  30   BUN  --   --   --   --   --  11  --  12  --  16   CR 0.85  --   --   --   --  0.92  --  0.85  --  1.06*   ANIONGAP  --   --   --   --   --  4  --  6  --  6   GABRIEL  --   --   --   --   --  9.3  --  8.6  --  9.5   GLC  --  174* 205* 172*   < > 190*   < > 274*   < > 359*    < > = values in this interval not displayed.     No results found for this or any previous visit (from the past 24 hour(s)).  Medications       buPROPion  200 mg Oral BID     furosemide  40 mg Oral Daily     insulin aspart  1-7 Units Subcutaneous TID AC     insulin aspart  1-5 Units Subcutaneous At Bedtime     insulin aspart   Subcutaneous TID w/meals     insulin glargine  10 Units Subcutaneous QAM AC     lidocaine  1 patch Transdermal Q24H     lidocaine   Transdermal Q8H     losartan  50 mg Oral Daily     metoprolol succinate ER  50 mg Oral Daily     multivitamin w/minerals  1 tablet Oral Daily     pantoprazole  40 mg Oral BID AC     polyethylene glycol  17 g Oral Daily     rivaroxaban ANTICOAGULANT  15 mg Oral BID w/meals     sodium chloride (PF)  3 mL Intracatheter Q8H

## 2022-02-24 NOTE — PLAN OF CARE
PRIMARY DIAGNOSIS: GENERALIZED WEAKNESS    OUTPATIENT/OBSERVATION GOALS TO BE MET BEFORE DISCHARGE  1. Orthostatic performed: N/A    2. Tolerating PO medications: Yes    3. Return to near baseline physical activity: No    4. Cleared for discharge by consultants (if involved): No    Discharge Planner Nurse   Safe discharge environment identified: No  Barriers to discharge: Yes        Entered by: Sherwin Woody 02/23/2022 10:56 PM     Please review provider order for any additional goals.   Nurse to notify provider when observation goals have been met and patient is ready for discharge.

## 2022-02-24 NOTE — PROGRESS NOTES
Care Management Discharge Note    Discharge Date: 02/24/2022       Discharge Disposition: Skilled Nursing Facilty- [facility]    Discharge Services:      Discharge DME:      Discharge Transportation: agency    Private pay costs discussed: transportation costs and insurance costs out of pocket expenses and co-pays    PAS Confirmation Code: [id number]  Patient/family educated on Medicare website which has current facility and service quality ratings:      Education Provided on the Discharge Plan:  yes  Persons Notified of Discharge Plans: pt, medical team  Patient/Family in Agreement with the Plan: yes    Handoff Referral Completed: No    Additional Information:  Patient accepted at [facility] pending [employer] insurance authorization.    JOSE A to continue to follow and assist with discharge planning.    CAROL Doe  Daytime (8:00am-4:30pm): [phone]  After-Hours SW Pager (4:30pm-11:30pm): [phone]             CAROL Tang

## 2022-02-24 NOTE — PLAN OF CARE
Orientation/Cognitive: A/Ox4. Forgetful   Observation Goals (Met/ Not Met): Not Met   Mobility Level/Assist Equipment: Ax1/GB/walker   Fall Risk (Y/N): Yes   Behavior Concerns: Denies SI/SIB   Pain Management: Pain controlled with atarax 25 mg prn po x1, tylenol prn po x1, robaxin prn po x1, and oxy 5mg prn po x1. Lidoderm patch removed   ABNL VS/O2: WNL   ABNL Lab/BG: BG at HS-205, 174. Insulin coverage given at HS.   Diet: Tolerating regular diet   Bowel/Bladder: Continent of B/B. No BM this shift, pt requested medications for bowel regimen. Declined senna prn, education provided. On scheduled miralax daily.  Skin Concerns: Peeling bilateral feet. Healing incision on R hip from ORIF 1/23, open to air, CDI.   Drains/Devices: None   Tests/Procedures for next shift: None   Anticipated DC date: Pending TCU placement   Patient Stated Goal for Today: Speak to  about placement- RN will handoff       PRIMARY DIAGNOSIS: GENERALIZED WEAKNESS    OUTPATIENT/OBSERVATION GOALS TO BE MET BEFORE DISCHARGE  1. Orthostatic performed: N/A    2. Tolerating PO medications: Yes    3. Return to near baseline physical activity: No    4. Cleared for discharge by consultants (if involved): No    Discharge Planner Nurse   Safe discharge environment identified: No  Barriers to discharge: Yes        Entered by: Sherwin Woody 02/24/2022      Please review provider order for any additional goals.   Nurse to notify provider when observation goals have been met and patient is ready for discharge.

## 2022-02-24 NOTE — PROGRESS NOTES
Care Management Follow Up    Length of Stay (days): 0    Expected Discharge Date: 02/25/2022     Concerns to be Addressed:       Patient plan of care discussed at interdisciplinary rounds: Yes    Anticipated Discharge Disposition: Skilled Nursing Facilty     Anticipated Discharge Services:  TCU  Anticipated Discharge DME:      Patient/family educated on Medicare website which has current facility and service quality ratings:  No  Education Provided on the Discharge Plan:  Yes  Patient/Family in Agreement with the Plan: yes    Referrals Placed by CM/SW: Post Acute Facilities, Transportation  Private pay costs discussed: Not applicable    Additional Information:  SW completed PAS. PAS indicated further review needs to takes place.    PAS-RR    D: Per DHS regulation, SW completed and submitted PAS-RR to [place] [employer] Direct Connect via the [employer] Line.  PAS-RR confirmation # is : [id number]    I: SW spoke with Patient and they are aware a PAS-RR has been submitted.  SW reviewed with Patient that they may be contacted for a follow up appointment within 10 days of hospital discharge if their SNF stay is < 30 days.  Contact information for [employer] was also provided.    A: Patient verbalized understanding.    P: Further questions may be directed to [employer] at #[phone], option #4 for PAS-RR staff.        Andres Mcmahon, CAROL    [facility]\

## 2022-02-24 NOTE — PROGRESS NOTES
Observation goals  PRIOR TO DISCHARGE       Comments:   -diagnostic tests and consults completed and resulted: Met    -vital signs normal or at patient baseline: Met     -tolerating oral intake to maintain hydration : Met    -adequate pain control on oral analgesics: Met     -returns to baseline functional status: Partially met     -safe disposition plan has been identified: Not met    Nurse to notify provider when observation goals have been met and patient is ready for discharge.

## 2022-02-24 NOTE — PROGRESS NOTES
Observation goals  PRIOR TO DISCHARGE       Comments:   -diagnostic tests and consults completed and resulted: Met     -vital signs normal or at patient baseline: Partially met, slightly elevated BP.      -tolerating oral intake to maintain hydration : Met     -adequate pain control on oral analgesics: Met     -returns to baseline functional status: Partially met      -safe disposition plan has been identified: Not met     Nurse to notify provider when observation goals have been met and patient is ready for discharge.

## 2022-02-24 NOTE — PLAN OF CARE
PRIMARY DIAGNOSIS: GENERALIZED WEAKNESS    OUTPATIENT/OBSERVATION GOALS TO BE MET BEFORE DISCHARGE  1. Orthostatic performed: N/A    2. Tolerating PO medications: Yes    3. Return to near baseline physical activity: No    4. Cleared for discharge by consultants (if involved): No    Discharge Planner Nurse   Safe discharge environment identified: No  Barriers to discharge: Yes        Entered by: Sherwin Woody 02/24/2022 1:11 AM     Please review provider order for any additional goals.   Nurse to notify provider when observation goals have been met and patient is ready for discharge.

## 2022-02-25 VITALS
WEIGHT: 188 LBS | SYSTOLIC BLOOD PRESSURE: 141 MMHG | DIASTOLIC BLOOD PRESSURE: 74 MMHG | HEART RATE: 66 BPM | RESPIRATION RATE: 18 BRPM | TEMPERATURE: 97.9 F | BODY MASS INDEX: 32.27 KG/M2 | OXYGEN SATURATION: 97 %

## 2022-02-25 LAB
GLUCOSE BLDC GLUCOMTR-MCNC: 201 MG/DL (ref 70–99)
GLUCOSE BLDC GLUCOMTR-MCNC: 202 MG/DL (ref 70–99)
GLUCOSE BLDC GLUCOMTR-MCNC: 216 MG/DL (ref 70–99)
GLUCOSE BLDC GLUCOMTR-MCNC: 220 MG/DL (ref 70–99)

## 2022-02-25 PROCEDURE — 82962 GLUCOSE BLOOD TEST: CPT

## 2022-02-25 PROCEDURE — 99217 PR OBSERVATION CARE DISCHARGE: CPT | Performed by: PHYSICIAN ASSISTANT

## 2022-02-25 PROCEDURE — 96372 THER/PROPH/DIAG INJ SC/IM: CPT

## 2022-02-25 PROCEDURE — 250N000013 HC RX MED GY IP 250 OP 250 PS 637: Performed by: PHYSICIAN ASSISTANT

## 2022-02-25 PROCEDURE — 250N000013 HC RX MED GY IP 250 OP 250 PS 637: Performed by: STUDENT IN AN ORGANIZED HEALTH CARE EDUCATION/TRAINING PROGRAM

## 2022-02-25 PROCEDURE — G0378 HOSPITAL OBSERVATION PER HR: HCPCS

## 2022-02-25 RX ORDER — OXYCODONE HYDROCHLORIDE 5 MG/1
5 TABLET ORAL EVERY 6 HOURS PRN
Qty: 12 TABLET | Refills: 0 | Status: SHIPPED | OUTPATIENT
Start: 2022-02-25

## 2022-02-25 RX ORDER — METHOCARBAMOL 500 MG/1
500 TABLET, FILM COATED ORAL 3 TIMES DAILY PRN
Qty: 12 TABLET | Refills: 0 | Status: SHIPPED | OUTPATIENT
Start: 2022-02-25

## 2022-02-25 RX ORDER — BISACODYL 5 MG
5 TABLET, DELAYED RELEASE (ENTERIC COATED) ORAL DAILY PRN
DISCHARGE
Start: 2022-02-25

## 2022-02-25 RX ORDER — TRAZODONE HYDROCHLORIDE 100 MG/1
100 TABLET ORAL
Qty: 12 TABLET | Refills: 0 | Status: SHIPPED | OUTPATIENT
Start: 2022-02-25

## 2022-02-25 RX ADMIN — BUPROPION HYDROCHLORIDE 200 MG: 200 TABLET, FILM COATED, EXTENDED RELEASE ORAL at 14:21

## 2022-02-25 RX ADMIN — HYDROXYZINE HYDROCHLORIDE 25 MG: 25 TABLET ORAL at 12:05

## 2022-02-25 RX ADMIN — LIDOCAINE 1 PATCH: 246 PATCH TOPICAL at 08:35

## 2022-02-25 RX ADMIN — ACETAMINOPHEN 975 MG: 325 TABLET, FILM COATED ORAL at 08:34

## 2022-02-25 RX ADMIN — METOPROLOL SUCCINATE 50 MG: 50 TABLET, EXTENDED RELEASE ORAL at 08:34

## 2022-02-25 RX ADMIN — METHOCARBAMOL 500 MG: 500 TABLET ORAL at 14:21

## 2022-02-25 RX ADMIN — OXYCODONE HYDROCHLORIDE 5 MG: 5 TABLET ORAL at 14:21

## 2022-02-25 RX ADMIN — BUPROPION HYDROCHLORIDE 200 MG: 200 TABLET, FILM COATED, EXTENDED RELEASE ORAL at 08:34

## 2022-02-25 RX ADMIN — METHOCARBAMOL 500 MG: 500 TABLET ORAL at 03:33

## 2022-02-25 RX ADMIN — LOSARTAN POTASSIUM 50 MG: 50 TABLET, FILM COATED ORAL at 08:34

## 2022-02-25 RX ADMIN — POLYETHYLENE GLYCOL 3350 17 G: 17 POWDER, FOR SOLUTION ORAL at 08:35

## 2022-02-25 RX ADMIN — PANTOPRAZOLE SODIUM 40 MG: 40 TABLET, DELAYED RELEASE ORAL at 08:34

## 2022-02-25 RX ADMIN — OXYCODONE HYDROCHLORIDE 5 MG: 5 TABLET ORAL at 08:34

## 2022-02-25 RX ADMIN — RIVAROXABAN 15 MG: 15 TABLET, FILM COATED ORAL at 08:35

## 2022-02-25 NOTE — PROGRESS NOTES
Observation goals  PRIOR TO DISCHARGE       Comments:   -diagnostic tests and consults completed and resulted: Met     -vital signs normal or at patient baseline:Met     -tolerating oral intake to maintain hydration : Met     -adequate pain control on oral analgesics: Met     -returns to baseline functional status: Partially met      -safe disposition plan has been identified: Not met     Nurse to notify provider when observation goals have been met and patient is ready for discharge.

## 2022-02-25 NOTE — PROGRESS NOTES
Observation goals  PRIOR TO DISCHARGE       Comments:   -diagnostic tests and consults completed and resulted: Met     -vital signs normal or at patient baseline: Met     -tolerating oral intake to maintain hydration : Met     -adequate pain control on oral analgesics: Met     -returns to baseline functional status: Partially met      -safe disposition plan has been identified: Not met     Nurse to notify provider when observation goals have been met and patient is ready for discharge.       Note: Pt A&Ox4;fall risk, A1 with GB and walker to bedside commode. RLE- surgical would intact. Uses lidocaine scheduled. Oxycodone and cold packs PRN. . Pending TCU placement.

## 2022-02-25 NOTE — DISCHARGE SUMMARY
"[facility]  Hospitalist Discharge Summary      Date of Admission:  2/21/2022  Date of Discharge:  2/25/2022  Discharging Provider: Dee Richards PA-C  Discharge Service: Hospitalist Service    Discharge Diagnoses   Generalized weakness  Physical deconditioning  Opioid-induced constipation  T2DM, uncontrolled, now insulin dependent  Right posterior tibial vein DVT (2/10/22)  History of traumatic right femur fracture s/p ORIF (1/23/2022).  History of complete lateral meniscal tear of left knee s/p corticosteroid injection to left knee (1/30/2022).    Follow-ups Needed After Discharge   Follow-up Appointments     Follow Up and recommended labs and tests      Follow up with [facility] physician.  The following labs/tests are   recommended: repeat basic labs as needed. Xarelto to change to 20mg/day   dosing 3/2/22. 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 Admitted under observation status for safe disposition planning on 2/21 after presenting with concerns about safely caring for herself at home. For full HPI please see admission H&P form Dr. Radhames Dominguez dated 2/21/22.     Hx traumatic right femur fracture s/p ORIF (1/23/2022).  Complete lateral meniscal tear of left knee s/p corticosteroid injection to left knee (1/30/2022).  Reportedly was 50% weight bearing on RLE at TCU and appears discharged with wheelchair. Ambulating with walker and wheelchair at home / \"hopping around\". Safety supplies ordered to improve apartment safety, but " not yet arrived. Was requiring 2 person assist or lift, she is now able to ambulate with A1 and walker and felt to continue to be a candidate for rehabilitation at TCU. She was continued on her PTA regimen. PT recommended TCU. And SW assisted in discharge planning.      Opioid-induced constipation.  Last bm 2/21, daily is her normal. Increased scheduled Miralax to BID and given suppository during stay. Added PRN dulcolax suppository and PO. (Refuses Senokot-s).     Insulin dependent T2DM, uncontrolled  HbA1C 9.4% 1/24/22. Holding PTA Trulicity under observation status, resume on dsicharge. Started on Lantus and increased to 15 units daily on discharge along with sliding scale insulin. Continued insulin titration at TCU.      Recent Labs   Lab 02/25/22  1144 02/25/22  0822 02/25/22  0239 02/24/22  2205 02/24/22  1623 02/24/22  1214   * 216* 202* 236* 171* 178*       Right posterior tibial vein DVT.  Diagnosed via US 2/10/22. Pt has been maintained on Xarelto since that time. Cont PTA Xarelto. Dose change to 20mg daily starting 3/2/22. Discontinue ASA 325mg/d as she was on this for DVT ppx.      HTN.   Chronic HFpEF, not in acute exacerbation   Mild to moderate pulmonary hypertension   * Echo 1/23 with EF 50-55%, no RWMA, mild to moderate pulmonary hypertension  Recommend outpatient sleep study recommended for suspected RANI. Continue PTA Lasix, Losartan, Toprol XL.     Alcohol use disorder with mild alcoholic hepatitis.  Used to drink heavily until 2009, then quit for 12 years after inpatient treatment. Relapsed a few months ago and now drinks 2-3 drinks per day (2-3 shots of Bacardi Limon + cranberry juice.  She is not interested in quitting alcohol at this time. Encourage alcohol cessation going forward     Chronic iron deficiency anemia.  Esophagitis and anastomotic ulcer.  Baseline Hgb ranging from 7-9. Receives intermittent iron transfusions. [facility] consulted last admission and did not recommend  endoscopic evaluation. Continue PTA Protonix.     Other pertinent medical history and chronic stable diagnoses: Hold all nonessential medications, vitamins, supplements while on observation status.  S/p Kavon-en-Y gastric bypass  Chronic major depression. Continue PTA Trazodone and Wellbutrin  mg BID.  Insomnia.    Consultations This Hospital Stay   PHYSICAL THERAPY ADULT IP CONSULT  OCCUPATIONAL THERAPY ADULT IP CONSULT  SOCIAL WORK IP CONSULT  SOCIAL WORK IP CONSULT  CARE MANAGEMENT / SOCIAL WORK IP CONSULT  PHYSICAL THERAPY ADULT IP CONSULT  OCCUPATIONAL THERAPY ADULT IP CONSULT    Code Status   No CPR- Do NOT Intubate    Time Spent on this Encounter   I, Dee Richards PA-C, personally saw the patient today and spent greater than 30 minutes discharging this patient.       Dee Richards PA-C  [facility] EXTENDED RECOVERY AND SHORT STAY  [address] [facility] [address]  Phone: [phone]  ______________________________________________________________________    Physical Exam   Vital Signs: Temp: 97.9  F (36.6  C) Temp src: Oral BP: (!) 141/74 Pulse: 66   Resp: 18 SpO2: 97 % O2 Device: None (Room air)    Weight: 188 lbs 0 oz    General: Awake, alert, very pleasant woman who appears stated age. Looks comfortable sitting up in bed. No acute distress.  HEENT: Normocephalic, atraumatic. Extraocular movements intact.   Respiratory: Clear to auscultation bilaterally, no rales, wheezing, or rhonchi to anterolateral fields. No hypoxia.  Cardiovascular: Regular rate and rhythm, +S1 and S2, no murmur auscultated. No peripheral edema.   Gastrointestinal: Soft, obese but non-tender, non-distended. Bowel sounds present.  Skin: Warm, dry. No obvious rashes or lesions on exposed skin. Dorsalis pedis pulses palpable bilaterally.  Musculoskeletal: Moves all extremities equally.  Neurologic: AAO x3. Cranial nerves 2-12 grossly intact, normal strength and sensation.  Psychiatric:  Appropriate mood and affect. No obvious anxiety or depression.       Primary Care Physician   [facility]    Discharge Orders      General info for SNF    Length of Stay Estimate: Short Term Care: Estimated # of Days <30  Condition at Discharge: Stable  Level of care:skilled   Rehabilitation Potential: Fair  Admission H&P remains valid and up-to-date: Yes  Recent Chemotherapy: N/A  Use Nursing Home Standing Orders: Yes     Mantoux instructions    Give two-step Mantoux (PPD) Per Facility Policy Yes     Reason for your hospital stay    You were admitted with generalized weakness and deconditioning.     Glucose monitor nursing POCT    Before meals and at bedtime     Daily weights    Call Provider for weight gain of more than 2 pounds per day or 5 pounds per week.     Activity - Up with nursing assistance     Weight bearing status    As tolerated     Follow Up and recommended labs and tests    Follow up with custodial physician.  The following labs/tests are recommended: repeat basic labs as needed. Xarelto to change to 20mg/day dosing 3/2/22. Lantus is new for patient, please monitor glucose and adjust as needed     Additional Discharge Instructions    Recommend outpatient sleep study.     No CPR- Do NOT Intubate     Physical Therapy Adult Consult    Evaluate and treat as clinically indicated.    Reason:  deconditioning, s/p femur fracture ORIF     Occupational Therapy Adult Consult    Evaluate and treat as clinically indicated.    Reason:  deconditioning, s/p femur fracture ORIF     Fall precautions     Diet    Follow this diet upon discharge: Consistent carbohydrate diet, low sodium       Significant Results and Procedures   Results for orders placed or performed during the hospital encounter of 01/23/22   XR Pelvis w Hip Right 1 View    Narrative    EXAM: XR PELVIS AND HIP RIGHT 1 VIEW  LOCATION: [facility]  DATE/TIME: 1/23/2022 12:45 AM    INDICATION: Pain  COMPARISON:  None.      Impression    IMPRESSION: Acute mildly impacted basicervical fracture of the right femur. Questionable nondisplaced fracture of the right superior pubic ramus. No dislocation. Bones are demineralized. Right femoral fixation plate and screws incompletely imaged.   Extensive peripheral arterial calcifications.   US Abdomen Limited    Narrative    EXAM: US ABDOMEN LIMITED  LOCATION: [facility]  DATE/TIME: 1/23/2022 11:25 AM    INDICATION: Elevated LFTs.  COMPARISON: None.  TECHNIQUE: Limited abdominal ultrasound.    FINDINGS:    GALLBLADDER: Distended gallbladder. Echogenic wall thickening and/or calcification noted which is nonspecific. No definite shadowing stones.    BILE DUCTS: Mild extrahepatic biliary dilatation. The common duct measures 8 mm.    LIVER: Increased echogenicity from diffuse fatty infiltration. No focal mass.    RIGHT KIDNEY: No hydronephrosis.    PANCREAS: Borderline prominent pancreatic duct at 3 mm.    No ascites.      Impression    IMPRESSION:  1.  Mild extrahepatic bile duct dilatation and borderline pancreatic ductal dilatation of uncertain etiology.    2.  Distended gallbladder without definite cholecystitis. Somewhat unusual focal thickening and/or calcification in the posterior gallbladder wall of uncertain etiology.    3.  Extensive fatty infiltration of the liver.       XR Surgery ARASELI Fluoro L/T 5 Min w Stills    Narrative    EXAM: XR SURGERY ARASELI FLUORO LESS THAN 5 MIN W STILLS  LOCATION: [facility]  DATE/TIME: 1/23/2022 3:00 PM    INDICATION: Intraoperative fluoroscopy taken during an orthopedic procedure.  COMPARISON: 1/23/2022.  TECHNIQUE: Exam performed by surgeon.    FINDINGS:    FLUOROSCOPIC TIME: 1.3  NUMBER OF IMAGES: 6.      Impression    IMPRESSION: Multiple fluoroscopic spot films of the hip taken during an ORIF of a femoral neck fracture with lateral plate and femoral neck screw fixation. Negative for  intraoperative purposes. Please see the operative note for further details.   XR Lumbar Spine 2/3 Views    Narrative    EXAM: XR LUMBAR SPINE 2-3 VIEWS  LOCATION: [facility]  DATE/TIME: 1/24/2022 9:49 PM    INDICATION: Left lower extremity radiculopathy.  COMPARISON: None.  TECHNIQUE: CR Lumbar Spine.      Impression    IMPRESSION: Decreased osseous mineralization degrades evaluation. Subtle fractures cannot be excluded. The L4 and L5 vertebral body heights cannot be reliably assessed on the sagittal images. There is an age-indeterminate superior endplate compression   deformity noted involving the presumed L1 vertebral body with approximately 20% height loss. The L2 and L3 vertebral body heights are maintained. Alignment appears maintained. Multilevel degenerative changes are present. No definite acute extraspinal   abnormality. The patient is status post right-sided total hip arthroplasty. There is mild levoconvex curvature of the lumbar spine.   XR Femur Right 2 Views    Narrative    EXAM: XR FEMUR RIGHT 2 VIEW  LOCATION: [facility]  DATE/TIME: 1/25/2022 5:40 PM    INDICATION: Right leg postoperative follow-up.  COMPARISON: 1/23/2022.      Impression    IMPRESSION:  1.  ORIF right proximal femur fracture with lateral plate and femoral neck screw fixation. The orthopedic hardware is intact and the fracture is reduced. The femoral neck screw is within the lateral margin of the femoral neck.  2.  Plate and screw fixation of a healed distal femur fracture with lateral plate and screw fixation. Total knee arthroplasty.  3.  Mild right hip and sacroiliac degenerative arthrosis.  4.  Skin staples in the lateral thigh.   MR Lumbar Spine w/o Contrast    Narrative    EXAM: MR LUMBAR SPINE W/O CONTRAST  LOCATION: [facility]  DATE/TIME: 1/26/2022 7:54 PM    INDICATION: Low back pain, trauma  COMPARISON: X-ray 01/24/2022  TECHNIQUE: Routine Lumbar  Spine MRI without IV contrast.    FINDINGS:   Nomenclature assumes 5 lumbar type vertebra and hypoplastic ribs at T12. Mild lumbar levocurvature. 2 to 3 mm retrolisthesis at L1-L2 and 2 mm anterolisthesis at L4-L5. Mild chronic superior plate depression at L1 consistent with findings on the previous   radiographs. Additional mild chronic superior endplate depression at L5. Mild Modic type one endplate edema at T10-T11 and T11-T12. Modic type II endplate signal changes superiorly at L5. Mild facet complex edema at L4-L5 and L5-S1. Normal distal spinal   cord and cauda equina with conus medullaris at mid L2. Posterior paraspinal muscular atrophy. Osteoarthritic changes of the visualized sacroiliac joints.    T12-L1: Disc desiccation with minor loss of disc height. Tiny central disc extrusion. Mild facet arthropathy. No spinal canal or neural foraminal stenosis.     L1-L2: Mild to moderate loss of disc height and signal, greater on the right. Circumferential disc bulge and endplate spurring with asymmetric right foraminal and lateral disc osteophyte complex. Mild facet arthropathy. Mild narrowing of the right   lateral recess without central spinal canal stenosis. Moderate right neural foraminal stenosis. No left neural foraminal stenosis.    L2-L3: Disc desiccation with preserved disc height. Slight annular bulge without focal disc herniation. Mild to moderate facet arthropathy. Mildly mentum flavum thickening. No spinal canal or neural foraminal stenosis.     L3-L4: Disc desiccation with preserved disc height. Mild circumferential disc bulge. Moderate facet arthropathy and ligamentum flavum thickening. No spinal canal stenosis. Partial effacement of the inferior neural foramina without significant neural   foraminal stenosis.    L4-L5: Disc desiccation with preserved disc height. Broad-based posterior disc bulge and endplate spurring with asymmetric right foraminal and lateral disc osteophyte complex. Moderate to  advanced facet arthropathy and ligamentum flavum thickening.   Asymmetric narrowing of the right lateral recess without central spinal canal stenosis. Mild right neural foraminal stenosis. No left neural foraminal stenosis.    L5-S1: Disc desiccation with preserved disc height. Slight annular bulge without focal disc herniation. Minor endplate spurring with asymmetric left lateral disc osteophyte complex. Moderate facet arthropathy. No spinal canal stenosis. Mild right neural   foraminal stenosis. No left neural foraminal stenosis.      Impression    IMPRESSION:  1.  Mild to moderate multilevel lumbar spondylosis.  2.  Mild narrowing of the lateral recesses at several levels without high-grade central spinal canal stenosis.  3.  Moderate right neural foraminal stenosis at L1-L2. Mild right neural foraminal stenosis at L4-L5.  4.  Chronic superior endplate compression deformities of L1 and L5.   XR Knee Right 1/2 Views    Narrative    EXAM: XR KNEE RT 1 /2 VW  LOCATION: [facility]  DATE/TIME: 1/25/2022 5:50 PM    INDICATION: Right knee pain.  COMPARISON: None.      Impression    IMPRESSION:  1.  Right total knee arthroplasty with patellar resurfacing. The components are well seated without evidence of complication. Small joint effusion.  2.  ORIF healed distal femur fracture with lateral plate and screw fixation.  3.  Bone demineralization.  4.  Atherosclerotic calcification.   US Lower Extremity Venous Duplex Left    Narrative    US LOWER EXTREMITY VENOUS DUPLEX LEFT  1/26/2022 1:16 PM    CLINICAL HISTORY/INDICATION: left calf pain and tenderness    COMPARISON: None relevant    TECHNIQUE:   Grayscale, color-flow, and spectral waveform analysis were performed  of the deep veins of the left lower extremity    FINDINGS:   The left common femoral vein, femoral vein and popliteal vein  demonstrate normal compressibility, spectral waveform, color flow and  augmentation.    The left posterior  tibial vein, peroneal vein, and greater saphenous  vein are compressible.    The contralateral right common femoral vein demonstrates normal  compressibility, spectral waveform, color flow and augmentation.    Anechoic structure in left popliteal fossa measuring 4.1 x 2.8 x 0.4  cm. Significant edema is seen in the left calf particularly  surrounding this area.      Impression    IMPRESSION:   1. No evidence of deep venous thrombosis in the left lower extremity.  2. Left Baker's cyst measuring 4.1 x 2.8 x 0.4 cm with surrounding  significant edema. May represent a ruptured Lang's cyst.    DEVIN HOBBS MD         SYSTEM ID:  [id number]   MR Knee Left w/o Contrast    Narrative    EXAM: MR KNEE LEFT W/O CONTRAST  LOCATION: [facility]  DATE/TIME: 1/28/2022 7:05 PM    INDICATION: Acute left knee pain.  COMPARISON: None.  TECHNIQUE: Unenhanced.    FINDINGS:    MEDIAL COMPARTMENT:   -Meniscus: Normal.  -Cartilage: Partial-thickness cartilage fissuring with intrasubstance delamination over the central weightbearing femoral condyle measuring 9 x 9 mm. The tibial plateau cartilage is intact and well preserved.    LATERAL COMPARTMENT:  -Meniscus: Diffuse myxoid degeneration of the medial meniscus. High-grade, probable complete radial tear at the junction of the posterior body and posterior horn. The body of the meniscus is peripherally extruded into the lateral gutter. There is also   poorly defined degeneration and partial tearing of the anterior horn and root.  -Cartilage: Smooth full-thickness cartilage loss over the weightbearing lateral femoral condyle and tibial plateau.    PATELLOFEMORAL COMPARTMENT:   -Alignment: Patella midline. No subluxation or tilting.   -Cartilage: Normal.    CRUCIATE LIGAMENTS:   -ACL: Normal.  -PCL: Normal.    COLLATERAL LIGAMENTS:   -Medial collateral ligament: Intact with normal appearance.  -Lateral collateral ligament: Intact with normal  appearance.    POSTEROMEDIAL CORNER:  -Distal semimembranosus tendon is normal.   -Pes anserine tendons are normal. Posteromedial corner complex ligaments are intact.    POSTEROLATERAL CORNER:   -Popliteal tendon is intact. No tendinopathy.  -Biceps femoris tendon and posterolateral corner complex ligaments are intact.    EXTENSOR MECHANISM:   -Quadriceps tendon: Mild distal tendinosis without tearing.  -Patellar tendon: Moderate proximal and distal tendinosis without tearing or acute tendinitis.  -Patellofemoral ligaments and retinacula: Intact.    JOINT:   -Small knee joint effusion. No loose bodies visualized. Moderate-sized popliteal cyst.    BONES:  -Negative for fracture, contusion, AVN, or significant bone marrow edema. No bone marrow replacement lesion.  -Moderate size lateral femoral condyle and tibial plateau osteophytes. Tiny medial and patellofemoral compartment osteophytes.    SOFT TISSUES:   -Moderate nonspecific subcutaneous edema throughout the left leg, may represent lymphedema/volume overload.  -Moderate generalized atrophy of the musculature in the distal thigh and proximal calf. Increased T2 signal/edema within the muscles of the anterior compartment likely represents disuse or denervation change.  -Nerves and vessels are within normal limits for MRI.       Impression    IMPRESSION:  1.  End-stage degenerative arthritis in the lateral compartment, including myxoid degeneration and a high-grade radial tear of the lateral meniscus (likely complete), and broad areas of full-thickness cartilage loss over the femoral condyle and tibial   plateau. Large lateral femoral condyle and tibial plateau osteophytes are present.    2.  Small knee joint effusion, and moderate-sized popliteal cyst. No loose intra-articular bodies or significant synovitis visualized.    3.  Small area of grade III chondromalacia in the medial compartment of the femoral condyle. The medial meniscus is intact and is normal.    4.   Patellofemoral cartilage is intact, without significant arthritic changes.    5.  Ligaments and myotendinous structures are intact, without tearing or acute strain.    6.  Moderate generalized atrophy of the musculature in the left knee, suggesting chronic disuse or denervation change.    7.  Moderate nonspecific subcutaneous edema in the left knee. No focal fluid collection.   Echocardiogram Complete     Value    LVEF  50-55%    [facility]    [id number]  [id number]  [id number]  2011^JEAN-PIERRE^CHRISTINA^AMELIA     [facility]  Echocardiography Laboratory  [address] [place], [place] [address]     Name: RENE RODRÍGUEZ  MRN: [id number]  : 1960  Study Date: 2022 08:34 AM  Age: 61 yrs  Gender: Female  Patient Location: [facility]  Reason For Study: Heart Failure  Ordering Physician: CHRISTINA MOREJON  Performed By: Lucita Carrera     BSA: 2.0 m2  Height: 64 in  Weight: 213 lb  HR: 90  BP: 168/86 mmHg  ______________________________________________________________________________  Procedure  Complete Portable Echo Adult. Optison (NDC #4763-1364) given intravenously.  ______________________________________________________________________________  Interpretation Summary     1. Left ventricular systolic function is low normal. The visual ejection  fraction is 50-55%.  2. No regional wall motion abnormalities noted.  3. The right ventricle is normal in structure, function and size.  4. There is mild (1+) mitral regurgitation.  5. Mild to moderate pulmonary hypertension; The right ventricular systolic  pressure is approximated at 45mmHg plus the right atrial pressure. IVC  diameter <2.1 cm collapsing >50% with sniff suggests a normal RA pressure of 3  mmHg.  ______________________________________________________________________________  Left Ventricle  The left ventricle is normal in size. There is mild concentric left  ventricular hypertrophy. The visual ejection fraction is 50-55%. Left  ventricular systolic  function is low normal. Grade I or early diastolic  dysfunction. No regional wall motion abnormalities noted.     Right Ventricle  The right ventricle is normal in structure, function and size.     Atria  The left atrium is mildly dilated. Right atrial size is normal. There is no  color Doppler evidence of an atrial shunt.     Mitral Valve  There is mild mitral annular calcification. There is mild (1+) mitral  regurgitation.     Tricuspid Valve  The tricuspid valve is normal in structure and function. There is mild (1+)  tricuspid regurgitation. The right ventricular systolic pressure is  approximated at 45mmHg plus the right atrial pressure.     Aortic Valve  The aortic valve is normal in structure and function.     Pulmonic Valve  The pulmonic valve is normal in structure and function. There is trace  pulmonic valvular regurgitation.     Vessels  Normal size aorta. IVC diameter <2.1 cm collapsing >50% with sniff suggests a  normal RA pressure of 3 mmHg.     Pericardium  There is no pericardial effusion.     Rhythm  Sinus rhythm was noted.  ______________________________________________________________________________  MMode/2D Measurements & Calculations     IVSd: 1.4 cm  LVIDd: 4.8 cm  LVIDs: 3.7 cm  LVPWd: 1.2 cm  FS: 24.0 %  LV mass(C)d: 251.0 grams  LV mass(C)dI: 124.9 grams/m2  Ao root diam: 3.3 cm  LA dimension: 4.4 cm  asc Aorta Diam: 3.5 cm  LA/Ao: 1.3  LA Volume (BP): 71.6 ml  LA Volume Index (BP): 35.6 ml/m2  RWT: 0.52     TAPSE: 2.6 cm     Doppler Measurements & Calculations  MV E max michelle: 76.7 cm/sec  MV A max michelle: 89.8 cm/sec  MV E/A: 0.85  MV dec time: 0.17 sec  TR max michelle: 334.2 cm/sec  TR max P.7 mmHg  E/E' av.3  Lateral E/e': 10.1  Medial E/e': 12.5     ______________________________________________________________________________  Report approved by: Betsy Bingham 2022 09:29 AM               Discharge Medications   Current Discharge Medication List      START taking these  medications    Details   bisacodyl (DULCOLAX) 5 MG EC tablet Take 1 tablet (5 mg) by mouth daily as needed for constipation    Associated Diagnoses: Status post hip surgery      insulin aspart (NOVOLOG VIAL) 100 UNITS/ML vial Give before meals and before bed:  For Pre-Meal Glucose:  140-189 give 1 unit   190-239 give 2 units   240-289 give 3 units   290-339 give 4 units   = or >340 give 5 units     For Bedtime Glucose  200 - 239 give 1 units   240 - 289 give 1.5 units  290 - 339 give 2 units  = or >340 give 2.5 units  Qty: 10 mL, Refills: 0    Associated Diagnoses: Type 2 diabetes mellitus without complication, unspecified whether long term insulin use (H)      insulin glargine (LANTUS PEN) 100 UNIT/ML pen Inject 15 Units Subcutaneous every morning (before breakfast)  Qty: 15 mL    Comments: If Lantus is not covered by insurance, may substitute Basaglar or Semglee or other insulin glargine product per insurance preference at same dose and frequency.    Associated Diagnoses: Type 2 diabetes mellitus without complication, unspecified whether long term insulin use (H)         CONTINUE these medications which have CHANGED    Details   oxyCODONE (ROXICODONE) 5 MG tablet Take 1 tablet (5 mg) by mouth every 6 hours as needed for breakthrough pain or severe pain (half tab for moderate pain (pain rating 4-6) whole tab for severe pain (pain rating 7-10))  Qty: 12 tablet, Refills: 0    Comments: Future refills by PCP Dr. [facility] with phone number [phone].  Associated Diagnoses: Status post hip surgery         CONTINUE these medications which have NOT CHANGED    Details   acetaminophen (TYLENOL) 325 MG tablet Take 3 tablets (975 mg) by mouth every 8 hours as needed for mild pain    Associated Diagnoses: Closed fracture of right hip, initial encounter (H)      benzonatate (TESSALON) 100 MG capsule Take 100 mg by mouth 3 times daily as needed for cough      buPROPion (WELLBUTRIN SR) 200 MG 12 hr tablet  Take 200 mg by mouth 2 times daily      calcium carbonate (TUMS) 500 MG chewable tablet Take 1 chew tab by mouth 4 times daily as needed for heartburn      folic acid (FOLVITE) 1 MG tablet Take 1 tablet (1 mg) by mouth daily      furosemide (LASIX) 40 MG tablet Take 1 tablet (40 mg) by mouth daily      hydrOXYzine (ATARAX) 25 MG tablet Take 1 tablet (25 mg) by mouth every 6 hours as needed for other or anxiety (adjuvant pain)    Associated Diagnoses: Closed fracture of right hip, initial encounter (H)      Lidocaine (LIDOCARE) 4 % Patch Place 2 patches onto the skin every 24 hours To prevent lidocaine toxicity, patient should be patch free for 12 hrs daily.      losartan (COZAAR) 50 MG tablet Take 1 tablet (50 mg) by mouth daily      methocarbamol (ROBAXIN) 500 MG tablet Take 500 mg by mouth 3 times daily as needed for muscle spasms      metoprolol succinate ER (TOPROL-XL) 25 MG 24 hr tablet Take 50 mg by mouth daily       multivitamin w/minerals (THERA-VIT-M) tablet Take 1 tablet by mouth daily      pantoprazole (PROTONIX) 40 MG EC tablet Take 1 tablet (40 mg) by mouth 2 times daily (before meals)      polyethylene glycol (MIRALAX) 17 g packet Take 17 g by mouth daily as needed for constipation    Associated Diagnoses: Closed fracture of right hip, initial encounter (H)      rivaroxaban ANTICOAGULANT (XARELTO) 15 MG TABS tablet Take 15 mg by mouth 2 times daily (with meals) To start 20 mg daily on 3/2/22      traZODone (DESYREL) 100 MG tablet Take 100 mg by mouth nightly as needed for sleep      vitamin B-12 (CYANOCOBALAMIN) 250 MCG tablet Take 250 mcg by mouth daily      vitamin D2 (ERGOCALCIFEROL) 64347 units (1250 mcg) capsule Take 1 capsule (50,000 Units) by mouth every 7 days      dulaglutide (TRULICITY) 1.5 MG/0.5ML pen Inject 1.5 mg Subcutaneous every 7 days Mondays         STOP taking these medications       aspirin (ASA) 325 MG EC tablet Comments:   Reason for Stopping:             Allergies   Allergies    Allergen Reactions     Latex

## 2022-02-25 NOTE — PROGRESS NOTES
A/Ox4. VSS on RA. R hip surgical site, healing well, CDI. Pain managed w/ oxy, Robaxin, Lido patch and ice packs. PT rec TCU. Accepted at [facility] pending [employer] insurance authorization per SW note.

## 2022-02-25 NOTE — PROGRESS NOTES
Observation Goals:    : -diagnostic tests and consults completed and resulted - not met  -vital signs normal or at patient baseline - met  -tolerating oral intake to maintain hydration - met   -adequate pain control on oral analgesics - met  -returns to baseline functional status - not met  -safe disposition plan has been identified - not met

## 2022-02-25 NOTE — PROGRESS NOTES
04/30/19 0900 04/30/19 1000 04/30/19 1100   [facility] Group Therapy   Group Name Community Reintegration Mental Awareness Education   Specific Interventions Current Events Cognitive Stimulation Training Guided Imagery/Relaxation   Participation Level Active Active;Appropriate Minimal   Participation Quality Cooperative Cooperative Cooperative   Insight/Motivation Limited Good Good   Affect/Mood Display Appropriate Appropriate Appropriate   Cognition Alert Alert Alert      04/30/19 1400   [facility] Group Therapy   Group Name Therapeutic Recreation   Specific Interventions Cognitive Stimulation Training   Participation Level Supportive;Appropriate   Participation Quality Cooperative;Social   Insight/Motivation Good   Affect/Mood Display Appropriate   Cognition Alert      Care Management Discharge Note    Discharge Date: 02/25/2022       Discharge Disposition: Skilled Nursing Facilty- [facility]     Discharge Services:      Discharge DME:      Discharge Transportation: agency    Private pay costs discussed: transportation costs and insurance costs out of pocket expenses and co-pays    PAS Confirmation Code: [id number]  Patient/family educated on Medicare website which has current facility and service quality ratings:      Education Provided on the Discharge Plan:    Persons Notified of Discharge Plans: pt, medical team. TCU  Patient/Family in Agreement with the Plan: yes    Handoff Referral Completed: No    Additional Information:  DC orders: sent via DOD at 1450  Scripts:  PAS:  Trans: [facility] at 1530    JOSE A has identified no other social service needs at this time. SW will remain available should other needs arise.    CAROL Doe  Daytime (8:00am-4:30pm): [phone]  After-Hours SW Pager (4:30pm-11:30pm): [phone]               CAROL Tang

## 2022-02-25 NOTE — PROGRESS NOTES
Care Management Follow Up    Length of Stay (days): 4    Expected Discharge Date: 02/25/2022     Concerns to be Addressed:     Discharge planning    Patient plan of care discussed at interdisciplinary rounds: Yes    Anticipated Discharge Disposition: Skilled Nursing Facilty- [facility]     Anticipated Discharge Services:    Anticipated Discharge DME:      Patient/family educated on Medicare website which has current facility and service quality ratings: yes Education Provided on the Discharge Plan:  yes  Patient/Family in Agreement with the Plan: yes    Referrals Placed by CM/SW: Post Acute Facilities, Transportation  Private pay costs discussed: transportation costs and insurance costs out of pocket expenses and co-pays    Additional Information:  Pt accepted at TCU pending [employer] Ins auth.    ADDENDUM:  1116: Patient's [employer] Ins requesting a peer:peer review. JOSE A epaged Hospitalist with number to call. Outcome pending. JOSE A notified RN CC in the case that patient gets denied for TCU and needs Home Care.    SW to continue to follow and assist with discharge planning.    CAROL Doe  Daytime (8:00am-4:30pm): [phone]  After-Hours SW Pager (4:30pm-11:30pm): [phone]           CAROL Tang

## 2022-02-25 NOTE — PLAN OF CARE
Goal Outcome Evaluation: TCU placement, met    Plan of Care Reviewed With: patient     Orientation/Cognitive: A&Ox4  Observation Goals (Met/ Not Met): partially met  Mobility Level/Assist Equipment: A+1 w/ GB walker to BSC  Fall Risk (Y/N): Y  Behavior Concerns: N  Pain Management: oxycodone, tylenol, atarax, robaxin, ice pack  ABNL VS/O2: VSS on RA  ABNL Lab/BG: NA  Diet: Regular  Bowel/Bladder: Continent  Skin Concerns: R hip scar  Drains/Devices: N  Tests/Procedures for next shift: N  Anticipated DC date: 2/25/2022  Patient Stated Goal for Today: TCU placement, pain control

## 2022-02-25 NOTE — PROGRESS NOTES
Observation Goals:    : -diagnostic tests and consults completed and resulted - met  -vital signs normal or at patient baseline - met  -tolerating oral intake to maintain hydration - met   -adequate pain control on oral analgesics - met  -returns to baseline functional status - not met  -safe disposition plan has been identified - met

## 2022-02-25 NOTE — PLAN OF CARE
Goal Outcome Evaluation:    AVSS; R hip pain controlled with robaxin overnight; up with 1 assist and a walker/gait belt to the bedside commode; voiding; PT/SW/CM consults ordered; pt appeared to sleep well overnight; awaiting TCU placement.

## 2022-02-26 DIAGNOSIS — Z71.89 OTHER SPECIFIED COUNSELING: ICD-10-CM

## 2022-02-26 NOTE — PLAN OF CARE
Physical Therapy Discharge Summary    Reason for therapy discharge:    Discharged to transitional care facility.    Progress towards therapy goal(s). See goals on Care Plan in [facility] electronic health record for goal details.  Goals not met.  Barriers to achieving goals:   discharge from facility.    Therapy recommendation(s):    Continued therapy is recommended.  Rationale/Recommendations:  cont PT at TCU to further address deficits and optimize functional recovery.

## 2022-02-27 ENCOUNTER — PATIENT OUTREACH (OUTPATIENT)
Dept: CARE COORDINATION | Facility: CLINIC | Age: 62
End: 2022-02-27
Payer: COMMERCIAL

## 2022-02-27 NOTE — PROGRESS NOTES
Clinic Care Coordination Contact    Background: Care Coordination referral placed from [facility] discharge report for reason of patient meeting criteria for a TCM outreach call by [facility] team.    Assessment: Upon chart review, [facility] Team member will cancel/close the referral for TCM outreach due to reason below:    Patient is not established within [facility]. Upon chart review, [facility] Team member noted patient discharged to TCU    Plan: Care Coordination referral for TCM outreach canceled.    Britney Rogel MA  [facility], [facility]

## 2022-04-03 ENCOUNTER — HEALTH MAINTENANCE LETTER (OUTPATIENT)
Age: 62
End: 2022-04-03

## 2022-07-24 ENCOUNTER — HEALTH MAINTENANCE LETTER (OUTPATIENT)
Age: 62
End: 2022-07-24

## 2022-10-03 ENCOUNTER — HEALTH MAINTENANCE LETTER (OUTPATIENT)
Age: 62
End: 2022-10-03

## 2023-02-12 ENCOUNTER — HEALTH MAINTENANCE LETTER (OUTPATIENT)
Age: 63
End: 2023-02-12

## 2023-08-13 ENCOUNTER — HEALTH MAINTENANCE LETTER (OUTPATIENT)
Age: 63
End: 2023-08-13

## 2023-12-31 ENCOUNTER — HEALTH MAINTENANCE LETTER (OUTPATIENT)
Age: 63
End: 2023-12-31

## 2024-05-18 ENCOUNTER — HEALTH MAINTENANCE LETTER (OUTPATIENT)
Age: 64
End: 2024-05-18

## 2024-10-05 ENCOUNTER — HEALTH MAINTENANCE LETTER (OUTPATIENT)
Age: 64
End: 2024-10-05

## 2025-01-19 ENCOUNTER — HEALTH MAINTENANCE LETTER (OUTPATIENT)
Age: 65
End: 2025-01-19

## (undated) DEVICE — GLOVE PROTEXIS W/NEU-THERA 7.5  2D73TE75

## (undated) DEVICE — ESU GROUND PAD UNIVERSAL W/O CORD

## (undated) DEVICE — DRSG AQUACEL AG HYDROFIBER  3.5X10" 422605

## (undated) DEVICE — SU VICRYL 0 CT-1 27" J340H

## (undated) DEVICE — DRILL BIT 3.2X145MM  310.31

## (undated) DEVICE — DRAPE C-ARM 60X42" 1013

## (undated) DEVICE — GLOVE PROTEXIS W/NEU-THERA 8.0  2D73TE80

## (undated) DEVICE — SOL NACL 0.9% IRRIG 1000ML BOTTLE 2F7124

## (undated) DEVICE — LINEN TOWEL PACK X5 5464

## (undated) DEVICE — GLOVE PROTEXIS BLUE W/NEU-THERA 8.0  2D73EB80

## (undated) DEVICE — MANIFOLD NEPTUNE 4 PORT 700-20

## (undated) DEVICE — SU VICRYL 2-0 CP-1 27" UND J266H

## (undated) DEVICE — PIN GUIDE 2.5X230MM 900.723

## (undated) DEVICE — CAST PADDING 4" COTTON WEBRIL UNSTERILE 9084

## (undated) DEVICE — SUCTION MANIFOLD NEPTUNE SGL

## (undated) DEVICE — CAST PADDING 4" UNSTERILE 9044

## (undated) DEVICE — DRSG ADAPTIC 3X8" 6113

## (undated) DEVICE — IMP SCR SYN CORTEX 4.5X46MM SELF TAP SS 214.846: Type: IMPLANTABLE DEVICE | Site: HIP | Status: NON-FUNCTIONAL

## (undated) DEVICE — PACK HIP NAILING SOP15HNSB

## (undated) DEVICE — SOL WATER IRRIG 1000ML BOTTLE 2F7114

## (undated) DEVICE — GLOVE PROTEXIS MICRO 8.0  2D73PM80

## (undated) DEVICE — DRAPE STERI U 1015

## (undated) DEVICE — STPL SKIN 35W 6.9MM  PXW35

## (undated) RX ORDER — FENTANYL CITRATE 0.05 MG/ML
INJECTION, SOLUTION INTRAMUSCULAR; INTRAVENOUS
Status: DISPENSED
Start: 2022-01-23

## (undated) RX ORDER — HYDROMORPHONE HCL IN WATER/PF 6 MG/30 ML
PATIENT CONTROLLED ANALGESIA SYRINGE INTRAVENOUS
Status: DISPENSED
Start: 2022-01-23

## (undated) RX ORDER — CEFAZOLIN SODIUM 2 G/100ML
INJECTION, SOLUTION INTRAVENOUS
Status: DISPENSED
Start: 2022-01-23

## (undated) RX ORDER — TRANEXAMIC ACID 10 MG/ML
INJECTION, SOLUTION INTRAVENOUS
Status: DISPENSED
Start: 2022-01-23

## (undated) RX ORDER — FENTANYL CITRATE 50 UG/ML
INJECTION, SOLUTION INTRAMUSCULAR; INTRAVENOUS
Status: DISPENSED
Start: 2022-01-23

## (undated) RX ORDER — ALBUMIN, HUMAN INJ 5% 5 %
SOLUTION INTRAVENOUS
Status: DISPENSED
Start: 2022-01-23